# Patient Record
Sex: MALE | Race: WHITE | NOT HISPANIC OR LATINO | Employment: FULL TIME | URBAN - METROPOLITAN AREA
[De-identification: names, ages, dates, MRNs, and addresses within clinical notes are randomized per-mention and may not be internally consistent; named-entity substitution may affect disease eponyms.]

---

## 2023-07-10 LAB
BASOPHILS (10*3/UL) IN BLOOD BY AUTOMATED COUNT: 0.06 X10E9/L (ref 0–0.1)
BASOPHILS/100 LEUKOCYTES IN BLOOD BY AUTOMATED COUNT: 1.3 % (ref 0–2)
EOSINOPHILS (10*3/UL) IN BLOOD BY AUTOMATED COUNT: 0.11 X10E9/L (ref 0–0.7)
EOSINOPHILS/100 LEUKOCYTES IN BLOOD BY AUTOMATED COUNT: 2.4 % (ref 0–6)
ERYTHROCYTE DISTRIBUTION WIDTH (RATIO) BY AUTOMATED COUNT: 12.8 % (ref 11.5–14.5)
ERYTHROCYTE MEAN CORPUSCULAR HEMOGLOBIN CONCENTRATION (G/DL) BY AUTOMATED: 33.7 G/DL (ref 32–36)
ERYTHROCYTE MEAN CORPUSCULAR VOLUME (FL) BY AUTOMATED COUNT: 94 FL (ref 80–100)
ERYTHROCYTES (10*6/UL) IN BLOOD BY AUTOMATED COUNT: 4.68 X10E12/L (ref 4.5–5.9)
HEMATOCRIT (%) IN BLOOD BY AUTOMATED COUNT: 43.9 % (ref 41–52)
HEMOGLOBIN (G/DL) IN BLOOD: 14.8 G/DL (ref 13.5–17.5)
IGA (MG/DL) IN SER/PLAS: 48 MG/DL (ref 70–400)
IGG (MG/DL) IN SER/PLAS: 640 MG/DL (ref 700–1600)
IGM (MG/DL) IN SER/PLAS: 52 MG/DL (ref 40–230)
IMMATURE GRANULOCYTES/100 LEUKOCYTES IN BLOOD BY AUTOMATED COUNT: 0 % (ref 0–0.9)
LEUKOCYTES (10*3/UL) IN BLOOD BY AUTOMATED COUNT: 4.6 X10E9/L (ref 4.4–11.3)
LYMPHOCYTES (10*3/UL) IN BLOOD BY AUTOMATED COUNT: 1.4 X10E9/L (ref 1.2–4.8)
LYMPHOCYTES/100 LEUKOCYTES IN BLOOD BY AUTOMATED COUNT: 30.7 % (ref 13–44)
MONOCYTES (10*3/UL) IN BLOOD BY AUTOMATED COUNT: 0.5 X10E9/L (ref 0.1–1)
MONOCYTES/100 LEUKOCYTES IN BLOOD BY AUTOMATED COUNT: 11 % (ref 2–10)
NEUTROPHILS (10*3/UL) IN BLOOD BY AUTOMATED COUNT: 2.49 X10E9/L (ref 1.2–7.7)
NEUTROPHILS/100 LEUKOCYTES IN BLOOD BY AUTOMATED COUNT: 54.6 % (ref 40–80)
PLATELETS (10*3/UL) IN BLOOD AUTOMATED COUNT: 221 X10E9/L (ref 150–450)

## 2023-07-12 LAB
CD19%: ABNORMAL % (ref 6–19)
CD45%: 100 %
FMETH: ABNORMAL
FSIT1: ABNORMAL
MARKER INTERPRETATION: ABNORMAL
PV191: NORMAL

## 2023-10-24 ENCOUNTER — TELEPHONE (OUTPATIENT)
Dept: NEUROLOGY | Facility: CLINIC | Age: 58
End: 2023-10-24
Payer: COMMERCIAL

## 2023-10-24 DIAGNOSIS — G35 MULTIPLE SCLEROSIS (MULTI): Primary | ICD-10-CM

## 2023-10-24 NOTE — TELEPHONE ENCOUNTER
Marshall Padilla called. He would like to request Gabapentin 300mg 1 BID #180 with refills sent to [facility] .

## 2023-10-25 PROBLEM — G35 MULTIPLE SCLEROSIS (MULTI): Status: ACTIVE | Noted: 2023-10-25

## 2023-10-25 RX ORDER — GABAPENTIN 300 MG/1
1 CAPSULE ORAL 2 TIMES DAILY
COMMUNITY
Start: 2019-12-03 | End: 2023-10-25 | Stop reason: SDUPTHER

## 2023-10-25 RX ORDER — GABAPENTIN 300 MG/1
300 CAPSULE ORAL 2 TIMES DAILY
Qty: 180 CAPSULE | Refills: 1 | Status: SHIPPED | OUTPATIENT
Start: 2023-10-25 | End: 2024-04-18 | Stop reason: SDUPTHER

## 2023-10-25 NOTE — TELEPHONE ENCOUNTER
I personally reviewed OARRS and saw no reason not to refill my patients Gabapentin for his MS nerve pain. OARRS scanned into patient's chart and script sent via EPIC.  I have considered the risks of abuse, dependence, addiction, and diversion.

## 2023-11-28 ENCOUNTER — EVALUATION (OUTPATIENT)
Dept: PHYSICAL THERAPY | Facility: CLINIC | Age: 58
End: 2023-11-28
Payer: COMMERCIAL

## 2023-11-28 DIAGNOSIS — M54.2 NECK PAIN: ICD-10-CM

## 2023-11-28 DIAGNOSIS — R29.898 DECREASED ROM OF NECK: ICD-10-CM

## 2023-11-28 DIAGNOSIS — R26.9 ABNORMALITY OF GAIT: Primary | ICD-10-CM

## 2023-11-28 DIAGNOSIS — R26.89 BALANCE PROBLEM: ICD-10-CM

## 2023-11-28 PROCEDURE — 97164 PT RE-EVAL EST PLAN CARE: CPT | Mod: GP

## 2023-11-28 NOTE — PROGRESS NOTES
Physical Therapy Re-Evaluation:  Neuro    Patient Name:  Marshall Padilla   MRN:  [id number]   Date of Evaluation:  11/28/23   Time Calculation  Start Time: 1635  Stop Time: 1737  Time Calculation (min): 62 min  Visit Number:  13  Insurance Information:  20vs/yr    1. Abnormality of gait  Follow Up In Physical Therapy      2. Balance problem  Follow Up In Physical Therapy      3. Decreased ROM of neck  Follow Up In Physical Therapy      4. Neck pain  Follow Up In Physical Therapy          Assessment: Marshall Padilla is a 57 year old male referred to outpatient PT for neck, R upper extremity, R lower extremity, and gait dysfunction due to process within the patient that is being investigated and currently thought to be MS vs. myelomalacia.  It is apparent that since this therapist last saw patient that his R UE has worsened in strength, sensation and overall appearance.  Additionally, gait mechanics and upright mobility seem to have worsened.  Increased time was spent discussing the snowball nature of loss of function and the resulting less participation in activities that results in further loss of function in hopes of encouraging some more forced use of the R UE and LE (as tolerable).  This therapist will continue to work with and follow patient as he continues on with the rest of his care at [facility].  Based on patient's examination, concurrent co-morbidities, and presentation, patient's level of complexity is High.  As a result, recommending continued PT services to facilitate improvement in [facility].    Problems include:  Activity limitations, Aerobic capacity / endurance, Balance, Coordination, Decreased functional level, Decreased knowledge of HEP, Fall risk, Gait / locomotion, Motor function / tone , Pain, Participation restrictions, Posture, ROM, Sensory, Strength, and Transfers    Goals:  By Discharge patient will demo:    Improvement in gait mechanics without accessory / compensatory swing strategies for advancement of R  "LE.    Reciprocal stair navigation without vaulting of L LE    No falls during POC.    Return to regular aerobic exercise of minimum 3x per week for 20 minutes.    Other goals to be updated throughout POC.    Potential to achieve rehab goals is fair.    Plan:  1-2 times every week for a total of 10 visits.  Re-assessment after that time.    Activities that may be performed include but are not limited to:  balance, gait, transfers, modalities (as appropriate), e-stim (as appropriate), canalith repositioning maneuvers, therapeutic exercise, therapeutic activities.    Marshall Padilla in agreement with and understanding of all discussed this date.    ----------------------------------  ----------------------------------    Subjective:    HPI:  Marshall Padilla is a 57 year old male, known to this therapist, returning to outpatient PT neck, R upper extremity, R lower extremity, and gait dysfunction due to process within the patient that is being investigated and currently thought to be MS vs. myelomalacia.  Since was last here, called to get in to see Dr. Willis but he left.  Went to look into [facility] and establish care there, of which he was able to get in \"pretty quickly.\"  Saw 5 different doctors and R hand is all clenched and like a claw now.  Baclofen is fully increased.  Has had a lot of tests done in which \"nobody could find anything.\"  Thinks that could be myelomalacia with spinal cord.  Tomorrow is starting 3 days of steroids.  Whole R arm has become \"non-usable\" in which is having \"weird sensations\" in biceps and triceps.  Been doing some OT with [facility].  Was told that they do believe that it is a diagnosis of MS.  No falls but feels like is stumbling more and feels like is catching R foot more and is \"hard to use.\"  Notices at times when drinks water that starts to cough, but this doesn't happen very often.  Tries to keep up with some exercise.  Hasn't been doing the bike at all because can't keep neck up.  Tries to do calf " "raises, step ups with R LE, been trying to do leg curls and extensions at home.    Upcoming appointments:  12/4 NP at [place] -- MS  12/8 with Dr. Leal -- neuromuscular    Patient Goal:  \"Want to see what can improve again\"     Precautions: low fall risk    Pain:  R arm varying from 2/10 to 6-7/10 based on activity and particpiation    ----------------------------------  ----------------------------------    OBJECTIVE    Observation: Observed muscle wasting of R UE and LE as compared to L.  Relative trunk lateral shift noted in sitting and standing.    ROM:  c-spine ROM limited d/t previous fusion in all directions.  L UE and LE WNL all directions at all joints.  R UE AROM effortful in all directions at all joints, most significantly impaired with overhead mobility and distal mobility at wrist and fingers.    Strength:     UE Strength Screening:   RUE LUE   Sh Flex 3-/5 5/5   Sh Abd 3-/5 5/5   Elbow Flex 3+/5 5/5   Elbow Ext 3-/5 5/5   Wrist Ext 3-/5 5/5   Wrist Flex 2+/5 5/5   Gross  2+/5 5/5     LE Strength Screening:   RLE LLE   Hip Flex 3-/5 5/5   Hip ABD 4/5 5/5   Hip ADD 4/5 5/5   Knee Ext 4+/5 5/5   Knee Flex 3+/5 5/5   Ankle DF 2/5 5/5   Ankle PF 3+/5 5/5      Coordination:   Grossly impaired with R UE and R LE as compared to L as noted throughout functional movements and mobility    Sensation:  WNL on L UE and LE.  R UE more impaired over C6, C7, C8 dermatomes.  R LE no gross abnormalities noted in sensation    Functional Mobility Assessment:  - Gait:  increased movement laterally at hips, limited foot clearance of R LE with swing resulting in increased vaulting of L LE and accessory hip movements, limited heel strike R LE  - Transfers:  no gross abnormalities noted  - Other:  Stairs:  Use of L UE for steadying, decreased control of placement of R LE with descent, at times R toe catching on step with ascent, vaulting with L " LE    ----------------------------------  ----------------------------------

## 2023-11-29 DIAGNOSIS — Z79.899 DRUG-INDUCED IMMUNODEFICIENCY (MULTI): ICD-10-CM

## 2023-11-29 DIAGNOSIS — G35 MULTIPLE SCLEROSIS (MULTI): Primary | ICD-10-CM

## 2023-11-29 DIAGNOSIS — D84.821 DRUG-INDUCED IMMUNODEFICIENCY (MULTI): ICD-10-CM

## 2023-11-29 RX ORDER — DIPHENHYDRAMINE HYDROCHLORIDE 50 MG/ML
50 INJECTION INTRAMUSCULAR; INTRAVENOUS ONCE
Status: CANCELLED | OUTPATIENT
Start: 2024-01-06

## 2023-11-29 RX ORDER — FAMOTIDINE 10 MG/ML
20 INJECTION INTRAVENOUS ONCE AS NEEDED
Status: CANCELLED | OUTPATIENT
Start: 2024-01-06

## 2023-11-29 RX ORDER — EPINEPHRINE 0.3 MG/.3ML
0.3 INJECTION SUBCUTANEOUS EVERY 5 MIN PRN
Status: CANCELLED | OUTPATIENT
Start: 2024-01-06

## 2023-11-29 RX ORDER — DIPHENHYDRAMINE HYDROCHLORIDE 50 MG/ML
50 INJECTION INTRAMUSCULAR; INTRAVENOUS AS NEEDED
Status: CANCELLED | OUTPATIENT
Start: 2024-01-06

## 2023-11-29 RX ORDER — ACETAMINOPHEN 325 MG/1
650 TABLET ORAL ONCE
Status: CANCELLED | OUTPATIENT
Start: 2024-01-06

## 2023-11-29 RX ORDER — ALBUTEROL SULFATE 0.83 MG/ML
3 SOLUTION RESPIRATORY (INHALATION) AS NEEDED
Status: CANCELLED | OUTPATIENT
Start: 2024-01-06

## 2023-12-06 ENCOUNTER — APPOINTMENT (OUTPATIENT)
Dept: PHYSICAL THERAPY | Facility: CLINIC | Age: 58
End: 2023-12-06
Payer: COMMERCIAL

## 2023-12-07 ENCOUNTER — TREATMENT (OUTPATIENT)
Dept: PHYSICAL THERAPY | Facility: CLINIC | Age: 58
End: 2023-12-07
Payer: COMMERCIAL

## 2023-12-07 DIAGNOSIS — M54.2 NECK PAIN: ICD-10-CM

## 2023-12-07 DIAGNOSIS — R29.898 DECREASED ROM OF NECK: ICD-10-CM

## 2023-12-07 DIAGNOSIS — R26.89 BALANCE PROBLEM: ICD-10-CM

## 2023-12-07 DIAGNOSIS — R26.9 ABNORMALITY OF GAIT: Primary | ICD-10-CM

## 2023-12-07 PROCEDURE — 97112 NEUROMUSCULAR REEDUCATION: CPT | Mod: GP

## 2023-12-07 NOTE — PROGRESS NOTES
"Physical Therapy Treatment      Patient Name: Marshall Padilla  MRN: [id number]  Today's Date: 12/7/2023  Visit #: 14  Insurance: 20vs/yr  Time Calculation  Start Time: 1148  Stop Time: 1231  Time Calculation (min): 43 min    1. Abnormality of gait        2. Balance problem        3. Decreased ROM of neck        4. Neck pain            ASSESSMENT:  At rest, patient showing significantly more R DF strength than L.  Focus of session further working on NMR of R ankle as a whole.  Demos significant preference for Wbing and use of L LE.    GOALS:  Improvement in gait mechanics without accessory / compensatory swing strategies for advancement of R LE.     Reciprocal stair navigation without vaulting of L LE     No falls during POC.     Return to regular aerobic exercise of minimum 3x per week for 20 minutes.     Other goals to be updated throughout POC.    PLAN:  Continue with NMR of R LE and R UE    SUBJECTIVE:  Had OT this morning.  Doing well.  Has noticed a difference since the steroid infusion, but not as big as would hope in difference.  Saw MS doctor on Monday and all of them are \"fully on board\" with MS diagnosis.  Started on a new muscle relaxer at night for the past 3 days.  Been using dowel regularly and feels that it has been \"fantastic.\"  Been using curl bar.    OBJECTIVE:    TREATMENT:  Neuromuscular Re-education (83367): 43 minutes    (Prior to activities, performing seated DF, showing full ROM and maintaining with strength of 4/5)    R Heel Taps with DF NMES (300usec, 52mAcc, 5 on/off, 40Hz) -- 2 sets x 12-15 -- focusing on keeping R LE on dorsiflexion.    Blue Dynadisc Standing Balance -- 2 sets to fatigue doing lateral shifts toward R; 2 sets to fatigue of standing balance maintaining neutral / centered position    ^^ performing all of the above to challenge dynamic balance and encourage NMR of R ankle  "

## 2023-12-12 ENCOUNTER — TREATMENT (OUTPATIENT)
Dept: PHYSICAL THERAPY | Facility: CLINIC | Age: 58
End: 2023-12-12
Payer: COMMERCIAL

## 2023-12-12 DIAGNOSIS — M54.2 NECK PAIN: ICD-10-CM

## 2023-12-12 DIAGNOSIS — R26.9 ABNORMALITY OF GAIT: Primary | ICD-10-CM

## 2023-12-12 DIAGNOSIS — R29.898 DECREASED ROM OF NECK: ICD-10-CM

## 2023-12-12 DIAGNOSIS — R26.89 BALANCE PROBLEM: ICD-10-CM

## 2023-12-12 PROCEDURE — 97112 NEUROMUSCULAR REEDUCATION: CPT | Mod: GP

## 2023-12-12 NOTE — PROGRESS NOTES
Physical Therapy Treatment      Patient Name: Marshall Padilla  MRN: [id number]  Today's Date: 12/12/2023  Visit #: 15  Insurance: 20vs/yr  Time Calculation  Start Time: 0930  Stop Time: 1014  Time Calculation (min): 44 min    1. Abnormality of gait        2. Balance problem        3. Decreased ROM of neck        4. Neck pain              ASSESSMENT:  At rest, patient continues to show significantly more R DF. Focus of session on hip flexion, DF and knee flexion of R LE this date.  Will continue to focus on this going forward.    GOALS:  Improvement in gait mechanics without accessory / compensatory swing strategies for advancement of R LE.     Reciprocal stair navigation without vaulting of L LE     No falls during POC.     Return to regular aerobic exercise of minimum 3x per week for 20 minutes.     Other goals to be updated throughout POC.    PLAN:  Continue with NMR of R LE and R UE    SUBJECTIVE:  Friday met with Neuromuscular and was told that will see again in 5 months.  Questioning why had good relief for 8 months and then got worse.  Steroid didn't help with arm but did significantly with R LE.  Saw physicians from neurosurgery and [facility].  Everything in neck looks good.  Looking to modify gabapentin.  Talking about epidural in C5-C6.  Thinking related to damage on spinal cord.  Met with PM&R about Botox and not a candidate for it.  Tried using recumbent bike which seemed to help.    OBJECTIVE:    TREATMENT:  Neuromuscular Re-education (34450): 44 minutes    (Prior to activities, performing seated DF, showing full ROM and maintaining with strength of 4/5 still at this time.  Stair navigation showing less vaulting and challenge with hip flexion.)    R DF with Hip Flexion (Yellow Band) -- 3 sets x 10 -- L UE support as needed for steadying    R HS Curls -- Cybex, 20lbs, 3 sets x 10 -- performing to encourage NMR of R Hamstring    Dynadisc Balance (Blue) -- Lunge Toward R 2 sets x 10; Static Standing 2 sets x  60 seconds    ^^ performing all of the above to challenge dynamic balance and encourage NMR of R ankle

## 2023-12-14 ENCOUNTER — APPOINTMENT (OUTPATIENT)
Dept: PHYSICAL THERAPY | Facility: CLINIC | Age: 58
End: 2023-12-14
Payer: COMMERCIAL

## 2023-12-19 ENCOUNTER — APPOINTMENT (OUTPATIENT)
Dept: PHYSICAL THERAPY | Facility: CLINIC | Age: 58
End: 2023-12-19
Payer: COMMERCIAL

## 2023-12-21 ENCOUNTER — TREATMENT (OUTPATIENT)
Dept: PHYSICAL THERAPY | Facility: CLINIC | Age: 58
End: 2023-12-21
Payer: COMMERCIAL

## 2023-12-21 DIAGNOSIS — R26.89 BALANCE PROBLEM: ICD-10-CM

## 2023-12-21 DIAGNOSIS — M54.2 NECK PAIN: ICD-10-CM

## 2023-12-21 DIAGNOSIS — R29.898 DECREASED ROM OF NECK: ICD-10-CM

## 2023-12-21 DIAGNOSIS — R26.9 ABNORMALITY OF GAIT: Primary | ICD-10-CM

## 2023-12-21 PROCEDURE — 97112 NEUROMUSCULAR REEDUCATION: CPT | Mod: GP

## 2023-12-21 NOTE — PROGRESS NOTES
Physical Therapy Treatment      Patient Name: Marshall Padilla  MRN: [id number]  Today's Date: 12/21/2023  Visit #: 16  Insurance: 20vs/yr  Time Calculation  Start Time: 1147  Stop Time: 1230  Time Calculation (min): 43 min    1. Abnormality of gait        2. Balance problem        3. Decreased ROM of neck        4. Neck pain                ASSESSMENT:  At rest, patient continues to show significantly more R DF. Focus of session on hip flexion, ankle eversion and knee flexion of R LE this date.  Will continue to focus on this going forward.    GOALS:  Improvement in gait mechanics without accessory / compensatory swing strategies for advancement of R LE.     Reciprocal stair navigation without vaulting of L LE     No falls during POC.     Return to regular aerobic exercise of minimum 3x per week for 20 minutes.     Other goals to be updated throughout POC.    PLAN:  Evaluate again in the new year with new referral.    SUBJECTIVE:  R LE has been feeling good.   DF of that ankle is still strong.  Sees Dr. Patel on 1/18.    OBJECTIVE:    TREATMENT:  Neuromuscular Re-education (51047): 43 minutes    (Prior to activities, performing seated DF, showing full ROM and maintaining with strength of 4/5 still at this time.  Stair navigation showing less vaulting and challenge with hip flexion.)    R Heel Taps with Ankle Everter NMES (300usec, 44mAcc, 5 on/off, 40Hz) -- 3 sets x 12-15 -- focusing on keeping R LE on dorsiflexion.     R Ankle Isometric Hold with NMES on Ankle Everters (300usec, 44mAcc, 5 on/off, 40Hz) -- 2 sets x 5 with 5 second holds -- standing on edge of step, performing bilaterally    R HS Curls -- Cybex, 20lbs, x 10; 25lbs, 3 sets x 10 -- performing to encourage NMR of R Hamstring     ^^ performing all of the above to challenge dynamic balance and encourage NMR of R ankle

## 2023-12-26 ENCOUNTER — APPOINTMENT (OUTPATIENT)
Dept: NEUROLOGY | Facility: HOSPITAL | Age: 58
End: 2023-12-26

## 2024-01-07 DIAGNOSIS — R25.2 CRAMP AND SPASM: ICD-10-CM

## 2024-01-08 RX ORDER — BACLOFEN 20 MG/1
40 TABLET ORAL 2 TIMES DAILY
Qty: 360 TABLET | Refills: 5 | Status: SHIPPED | OUTPATIENT
Start: 2024-01-08

## 2024-01-12 ENCOUNTER — INFUSION (OUTPATIENT)
Dept: INFUSION THERAPY | Facility: CLINIC | Age: 59
End: 2024-01-12
Payer: COMMERCIAL

## 2024-01-12 VITALS
WEIGHT: 217.04 LBS | HEART RATE: 66 BPM | OXYGEN SATURATION: 99 % | DIASTOLIC BLOOD PRESSURE: 58 MMHG | BODY MASS INDEX: 27.13 KG/M2 | RESPIRATION RATE: 16 BRPM | TEMPERATURE: 98 F | SYSTOLIC BLOOD PRESSURE: 130 MMHG

## 2024-01-12 DIAGNOSIS — G35 MULTIPLE SCLEROSIS (MULTI): ICD-10-CM

## 2024-01-12 DIAGNOSIS — Z79.899 DRUG-INDUCED IMMUNODEFICIENCY (MULTI): ICD-10-CM

## 2024-01-12 DIAGNOSIS — D84.821 DRUG-INDUCED IMMUNODEFICIENCY (MULTI): ICD-10-CM

## 2024-01-12 LAB
BASOPHILS # BLD AUTO: 0.03 X10*3/UL (ref 0–0.1)
BASOPHILS # BLD AUTO: 0.03 X10*3/UL (ref 0–0.1)
BASOPHILS NFR BLD AUTO: 0.9 %
BASOPHILS NFR BLD AUTO: 0.9 %
EOSINOPHIL # BLD AUTO: 0.05 X10*3/UL (ref 0–0.7)
EOSINOPHIL # BLD AUTO: 0.06 X10*3/UL (ref 0–0.7)
EOSINOPHIL NFR BLD AUTO: 1.5 %
EOSINOPHIL NFR BLD AUTO: 1.8 %
ERYTHROCYTE [DISTWIDTH] IN BLOOD BY AUTOMATED COUNT: 12.5 % (ref 11.5–14.5)
ERYTHROCYTE [DISTWIDTH] IN BLOOD BY AUTOMATED COUNT: 12.5 % (ref 11.5–14.5)
HCT VFR BLD AUTO: 40.8 % (ref 41–52)
HCT VFR BLD AUTO: 41.5 % (ref 41–52)
HGB BLD-MCNC: 14 G/DL (ref 13.5–17.5)
HGB BLD-MCNC: 14.2 G/DL (ref 13.5–17.5)
IGA SERPL-MCNC: 44 MG/DL (ref 70–400)
IGG SERPL-MCNC: 513 MG/DL (ref 700–1600)
IGM SERPL-MCNC: 43 MG/DL (ref 40–230)
IMM GRANULOCYTES # BLD AUTO: 0.01 X10*3/UL (ref 0–0.7)
IMM GRANULOCYTES # BLD AUTO: 0.01 X10*3/UL (ref 0–0.7)
IMM GRANULOCYTES NFR BLD AUTO: 0.3 % (ref 0–0.9)
IMM GRANULOCYTES NFR BLD AUTO: 0.3 % (ref 0–0.9)
LYMPHOCYTES # BLD AUTO: 1.49 X10*3/UL (ref 1.2–4.8)
LYMPHOCYTES # BLD AUTO: 1.51 X10*3/UL (ref 1.2–4.8)
LYMPHOCYTES NFR BLD AUTO: 44.3 %
LYMPHOCYTES NFR BLD AUTO: 44.8 %
MCH RBC QN AUTO: 31.2 PG (ref 26–34)
MCH RBC QN AUTO: 31.3 PG (ref 26–34)
MCHC RBC AUTO-ENTMCNC: 34.2 G/DL (ref 32–36)
MCHC RBC AUTO-ENTMCNC: 34.3 G/DL (ref 32–36)
MCV RBC AUTO: 91 FL (ref 80–100)
MCV RBC AUTO: 91 FL (ref 80–100)
MONOCYTES # BLD AUTO: 0.49 X10*3/UL (ref 0.1–1)
MONOCYTES # BLD AUTO: 0.57 X10*3/UL (ref 0.1–1)
MONOCYTES NFR BLD AUTO: 14.5 %
MONOCYTES NFR BLD AUTO: 17 %
NEUTROPHILS # BLD AUTO: 1.21 X10*3/UL (ref 1.2–7.7)
NEUTROPHILS # BLD AUTO: 1.27 X10*3/UL (ref 1.2–7.7)
NEUTROPHILS NFR BLD AUTO: 36 %
NEUTROPHILS NFR BLD AUTO: 37.7 %
NRBC BLD-RTO: 0 /100 WBCS (ref 0–0)
NRBC BLD-RTO: 0 /100 WBCS (ref 0–0)
PLATELET # BLD AUTO: 213 X10*3/UL (ref 150–450)
PLATELET # BLD AUTO: 214 X10*3/UL (ref 150–450)
RBC # BLD AUTO: 4.47 X10*6/UL (ref 4.5–5.9)
RBC # BLD AUTO: 4.55 X10*6/UL (ref 4.5–5.9)
WBC # BLD AUTO: 3.4 X10*3/UL (ref 4.4–11.3)
WBC # BLD AUTO: 3.4 X10*3/UL (ref 4.4–11.3)

## 2024-01-12 PROCEDURE — 96375 TX/PRO/DX INJ NEW DRUG ADDON: CPT | Performed by: NURSE PRACTITIONER

## 2024-01-12 PROCEDURE — 85025 COMPLETE CBC W/AUTO DIFF WBC: CPT

## 2024-01-12 PROCEDURE — 82784 ASSAY IGA/IGD/IGG/IGM EACH: CPT

## 2024-01-12 PROCEDURE — 96415 CHEMO IV INFUSION ADDL HR: CPT | Performed by: NURSE PRACTITIONER

## 2024-01-12 PROCEDURE — 96413 CHEMO IV INFUSION 1 HR: CPT | Performed by: NURSE PRACTITIONER

## 2024-01-12 RX ORDER — ACETAMINOPHEN 325 MG/1
650 TABLET ORAL ONCE
Status: COMPLETED | OUTPATIENT
Start: 2024-01-12 | End: 2024-01-12

## 2024-01-12 RX ORDER — EPINEPHRINE 0.3 MG/.3ML
0.3 INJECTION SUBCUTANEOUS EVERY 5 MIN PRN
OUTPATIENT
Start: 2024-01-12

## 2024-01-12 RX ORDER — ACETAMINOPHEN 325 MG/1
650 TABLET ORAL ONCE
Status: CANCELLED | OUTPATIENT
Start: 2024-01-12

## 2024-01-12 RX ORDER — ALBUTEROL SULFATE 0.83 MG/ML
3 SOLUTION RESPIRATORY (INHALATION) AS NEEDED
OUTPATIENT
Start: 2024-01-12

## 2024-01-12 RX ORDER — DIPHENHYDRAMINE HYDROCHLORIDE 50 MG/ML
50 INJECTION INTRAMUSCULAR; INTRAVENOUS AS NEEDED
OUTPATIENT
Start: 2024-01-12

## 2024-01-12 RX ORDER — DIPHENHYDRAMINE HYDROCHLORIDE 50 MG/ML
50 INJECTION INTRAMUSCULAR; INTRAVENOUS ONCE
Status: CANCELLED | OUTPATIENT
Start: 2024-01-12

## 2024-01-12 RX ORDER — FAMOTIDINE 10 MG/ML
20 INJECTION INTRAVENOUS ONCE AS NEEDED
OUTPATIENT
Start: 2024-01-12

## 2024-01-12 RX ORDER — DIPHENHYDRAMINE HYDROCHLORIDE 50 MG/ML
50 INJECTION INTRAMUSCULAR; INTRAVENOUS ONCE
Status: COMPLETED | OUTPATIENT
Start: 2024-01-12 | End: 2024-01-12

## 2024-01-12 RX ADMIN — DIPHENHYDRAMINE HYDROCHLORIDE 50 MG: 50 INJECTION INTRAMUSCULAR; INTRAVENOUS at 10:15

## 2024-01-12 RX ADMIN — ACETAMINOPHEN 650 MG: 325 TABLET ORAL at 10:15

## 2024-01-12 ASSESSMENT — ENCOUNTER SYMPTOMS
EXTREMITY WEAKNESS: 1
RESPIRATORY NEGATIVE: 1
NECK PAIN: 1
NECK STIFFNESS: 1
NUMBNESS: 1
CONSTITUTIONAL NEGATIVE: 1
CARDIOVASCULAR NEGATIVE: 1
ARTHRALGIAS: 1
CONSTIPATION: 1

## 2024-01-12 ASSESSMENT — PAIN SCALES - GENERAL: PAINLEVEL: 3

## 2024-01-12 NOTE — PROGRESS NOTES
[facility]   Infusion Clinic Note   Date: 2024   Name: Marshall Padilla  : 1965   MRN: [id number]         Reason for Visit: OP Infusion (PATIENT HERE FOR OCREVUS 600 MG INFUSION)      Accompanied by:Self   Visit Type:: Infusion   Diagnosis: Multiple sclerosis (CMS/HCC)    Drug-induced immunodeficiency (CMS/HCC)    Allergies:   Allergies as of 2024    (No Known Allergies)      Current [facility] has a current medication list which includes the following prescription(s): baclofen, gabapentin, and ocrelizumab, and the following Facility-Administered Medications: ocrelizumab (Ocrevus) 600 mg in sodium chloride 0.9% 500 mL IV.        Vitals:  Vitals:    24 0955   BP: (!) 155/93   Pulse: 88   Resp: 18   Temp: 36.7 °C (98 °F)   SpO2: 99%   Weight: 98.5 kg (217 lb 0.7 oz)      Infusion Pre-procedure Checklist   Allergies reviewed: yes   Medications reviewed: yes   Contraindications to treatment:No   Previous reaction to current treatment:No   Current Health Issues: None   Pain: 3 [3]' Shoulder [29] (right)   Is the pain different from normal: No   Is the pain tolerable: YES   Is your Doctor aware: YES   Contraindications based on patient history: No   Provider notified: Not applicable   Labs: Pending  Labs reviewed   Fall Risk Screening: Thayer Fall Risk  History of Falling, Immediate or Within 3 Months: No  Secondary Diagnosis: Yes  Ambulatory Aid: Walks without aid/bedrest/nurse assist  Intravenous Therapy/Heparin Lock: No  Gait/Transferring: Normal/bedrest/immobile  Mental Status: Oriented to own ability  Thayer Fall Risk Score: 15    Review of Systems   Constitutional: Negative.    Respiratory: Negative.     Cardiovascular: Negative.    Gastrointestinal:  Positive for constipation.   Musculoskeletal:  Positive for arthralgias, neck pain and neck stiffness.   Neurological:  Positive for extremity weakness (RIGHT ARM AND LEG) and numbness (RIGHT ARM AND LEG).      Negative for  "complaint: [] all other systems have been reviewed and are negative for complaint   Infusion Readiness:   Assessment Concerns Related to Infusion: No  Provider notified: n/a  Assess patient for the concerns below. Document provider notification as appropriate:  - Does not meet criteria to treat N/A  - Has an active or recent infection with/without current antibiotic use No  - Has recent/planned dental work No  - Has recent/planned surgeries No  - Has recently received or plans to receive vaccinations No  - Has treatment related toxicities No  - Is pregnant (unless noted otherwise) N/A    Initiated By: Patience Summers RN   Time: 10:41 AM     We administered acetaminophen, methylPREDNISolone sod succinate, diphenhydrAMINE, and ocrelizumab (Ocrevus) 600 mg in sodium chloride 0.9% 500 mL IV.       (Unless otherwise specified on patient specific therapy plan):     TREATMENT CONDITIONS:  Unless otherwise specified on patient specific therapy plan HOLD and notify provider prior to proceeding with today's infusion if patient with:  o Positive Hepatitis B Surface Ag or panel  o Positive T-Spot    No results found for: \"TBSIN\", \"TBGRES\", \"QFG\", \"TSPOTR\"   No results found for: \"HAGCN\", \"HEPBSURABI\", \"HBSAG\", \"XHAGF\", \"HEPBSAG\", \"EXTHEPBSAG\", \"NONUHSWGH\"   No results found for: \"HEPATOT\", \"HEPAIGM\", \"HEPBCIGM\", \"HEPBCAB\", \"HBEAG\", \"HEPCAB\"   No results found for: \"NONUHFIRE\", \"NONUHSWGH\", \"NONUHFISH\", \"EXTHEPBSAG\"    Labs reviewed and patient meets treatment conditions? Yes    DRUG SPECIFIC QUESTIONS:  Any signs or symptoms of Progressive multifocal leukoencephalopathy (PML) which may include: memory loss, trouble thinking, dizziness, loss of balance, difficulty talking / walking or loss of vision? No        Note authored and patient cared for by: Patience Summers RN    Note/Encounter reviewed by: ZAHIRA Wolfe, FNP-C. This provider on site at time of patient infusion. Infusion staff to notify this provider of any questions, " concerns, abnormals or issues during infusion. No issues reported. Pt. tolerated infusion without difficulty. Pt. not independently evaluated by this provider during today's encounter.

## 2024-01-12 NOTE — PATIENT INSTRUCTIONS
Today :We administered acetaminophen, methylPREDNISolone sod succinate, diphenhydrAMINE, and ocrelizumab (Ocrevus) 600 mg in sodium chloride 0.9% 500 mL IV.     For:   1. Multiple sclerosis (CMS/HCC)    2. Drug-induced immunodeficiency (CMS/[facility])         Your next appointment is due in:  6 MONTHYS        Please read the  Medication Guide that was given to you and reviewed during todays visit.     (Tell all doctors including dentists that you are taking this medication)     Go to the emergency room or call 911 if:  -You have signs of allergic reaction:   -Rash, hives, itching.   -Swollen, blistered, peeling skin.   -Swelling of face, lips, mouth, tongue or throat.   -Tightness of chest, trouble breathing, swallowing or talking     Call your doctor:  - If IV / injection site gets red, warm, swollen, itchy or leaks fluid or pus.     (Leave dressing on your IV site for at least 2 hours and keep area clean and dry  - If you get sick or have symptoms of infection or are not feeling well for any reason.    (Wash your hands often, stay away from people who are sick)  - If you have side effects from your medication that do not go away or are bothersome.     (Refer to the teaching your nurse gave you for side effects to call your doctor about)    - Common side effects may include:  stuffy nose, headache, feeling tired, muscle aches, upset stomach  - Before receiving any vaccines     - Call the [facility] at   If:  - You get sick, are on antibiotics, have had a recent vaccine, have surgery or dental work and your doctor wants your visit rescheduled.  - You need to cancel and reschedule your visit for any reason. Call at least 2 days before your visit if you need to cancel.   - Your insurance changes before your next visit.    (We will need to get approval from your new insurance. This can take up to two weeks.)     The [facility] is opened Monday thru Friday. We are closed on weekends and  holidays.   Voice mail will take your call if the center is closed. If you leave a message please allow 24 hours for a call back during weekdays. If you leave a message on a weekend/holiday, we will call you back the next business day.

## 2024-01-18 ENCOUNTER — OFFICE VISIT (OUTPATIENT)
Dept: NEUROLOGY | Facility: CLINIC | Age: 59
End: 2024-01-18
Payer: COMMERCIAL

## 2024-01-18 VITALS
RESPIRATION RATE: 18 BRPM | SYSTOLIC BLOOD PRESSURE: 130 MMHG | HEART RATE: 75 BPM | WEIGHT: 215 LBS | DIASTOLIC BLOOD PRESSURE: 85 MMHG | BODY MASS INDEX: 26.87 KG/M2

## 2024-01-18 DIAGNOSIS — G35 MULTIPLE SCLEROSIS (MULTI): Primary | ICD-10-CM

## 2024-01-18 PROCEDURE — 1036F TOBACCO NON-USER: CPT | Performed by: PSYCHIATRY & NEUROLOGY

## 2024-01-18 PROCEDURE — 99215 OFFICE O/P EST HI 40 MIN: CPT | Performed by: PSYCHIATRY & NEUROLOGY

## 2024-01-18 RX ORDER — ACETAMINOPHEN, DEXTROMETHORPHAN HBR, DOXYLAMINE SUCCINATE, PHENYLEPHRINE HCL 650; 20; 12.5; 1 MG/30ML; MG/30ML; MG/30ML; MG/30ML
SOLUTION ORAL
COMMUNITY

## 2024-01-18 RX ORDER — ACETAMINOPHEN 325 MG/1
TABLET ORAL EVERY 6 HOURS PRN
COMMUNITY
Start: 2022-07-13

## 2024-01-18 RX ORDER — ATORVASTATIN CALCIUM 20 MG/1
1 TABLET, FILM COATED ORAL
COMMUNITY
Start: 2023-07-11

## 2024-01-18 RX ORDER — TIZANIDINE 2 MG/1
2 TABLET ORAL NIGHTLY
COMMUNITY

## 2024-01-18 RX ORDER — ALBUTEROL SULFATE 90 UG/1
AEROSOL, METERED RESPIRATORY (INHALATION)
COMMUNITY
Start: 2023-12-19

## 2024-01-18 RX ORDER — ACETAMINOPHEN 500 MG
TABLET ORAL
COMMUNITY

## 2024-01-18 ASSESSMENT — PATIENT HEALTH QUESTIONNAIRE - PHQ9
SUM OF ALL RESPONSES TO PHQ9 QUESTIONS 1 AND 2: 0
2. FEELING DOWN, DEPRESSED OR HOPELESS: NOT AT ALL
1. LITTLE INTEREST OR PLEASURE IN DOING THINGS: NOT AT ALL

## 2024-01-18 ASSESSMENT — ENCOUNTER SYMPTOMS
LOSS OF SENSATION IN FEET: 1
OCCASIONAL FEELINGS OF UNSTEADINESS: 0
DEPRESSION: 0

## 2024-01-18 ASSESSMENT — PAIN SCALES - GENERAL: PAINLEVEL: 4

## 2024-01-18 NOTE — PROGRESS NOTES
Chief Complaint  PPMS.      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 known: PPMS with activity  Date of onset: Incidentally found 6/2017, clinical onset 3/2018  Date of diagnosis: 10/19/2017 radiological diagnosis, 05/15/2018 clinical diagnosis  disease course at onset: progressive relapsing   disease course now: progressive relapsing   Current DMT: Ocrevus since June 2018  Previous DMT: none   Last MRI brain: 6/2023  Last MRI cervical: 11/2023  Last MRI thoracic: 11/2023  Last VEP: negative in 8/2017  CSF: done 9/2017 normal CSF, negative OCBs and IGG index  JCV: positive 0.6 9/2017  VZV: positive 9/2017  HEP panel: negative 9/2017  NMO: negative 9/2017     This is a 51 year old right handed White male patient who presented initially with right arm pain in the setting of significant C5-6 disc prolapse with radiculomyelopathy and an incidental finding of an unrelated demyelinating -like plaque at the CMJ. brain MRI showed only non specific deep white matter hyperintense lesions. He underwent a successful cervical decompression surgery with Dr. Willis in October 2017 and his RUE improved. LP was negative for MS markers but he started having progressive RLE weakness in March 2018. Repeat imaging showed a new enhancing lesion at T4 on the right side. A diagnosis of PPMS with activity and progression was made.      INTERVAL HISTORY:  After Dr. Willis left , he went to [facility] for evaluation of progressive RUE weakness and new dystonic flexion of the right fingers. He was seen by neurosurgery and had repeat spine MRIs in November that were stable. He hand an EMG done that showed no evidence of ulnar neuropathy or motor neuron disease. He saw Dr. Hopper at the [place] who agreed with ocrevus treatment and referred him to the spasticity clinic where he was deemed unsuitable for botox for spasticity. He was treated empirically with high dose IV  steroids followed by prednisone taper at the [facility] with subsequent improvement in foot lift and gait. He then had an URTI and had an xray that was negative for PNA but showed some abdominal hyperdensity that he is undergoing CT abdomen for soon. He is noticing a change in his trunk/abdomen shape and a leaning towards the right when walking. His last ocrevus labs from last week showed a decrease in IgG level but normal ALC.      MS symptom review:     weakness: yes RLE and RUE   sensory changes: yes presenting symptom, RUE  incoordination: no  falling: no  gait change: yes, limp  painful vision loss: no  double vision: no  vertigo: no  facial/bulbar weakness: no but had facial numbness and twitching, resolved  Lhermitte's: no  bladder/bowel dysfunction: urinary frequency      spasticity: yes, right leg now feels heavier and circumducts while walking, improved after baclofen.   tonic spasms: yes RUE isometric painful triggered by lying down at night. almost daily.   tremors: no  RLS: no  dystonia: no  other movement disorders: no     heat sensitivity: yes especially during work ()   fatigue: yes  depression: no  anxiety: yes major   cognitive changes: nothing major     DMT: Ocrevus as of 6/2018  Side effects: No     Vitamin D: 5000 units daily  symptomatic: baclofen 40 mg, helpful. Gabapentin. Amantadine is prescribed but he never took it.               Review of Systems  All systems reviewed and are negative except as mentioned in the HPI.        Active Problems  Problems    · Abnormal MRI, cervical spine (793.7) (R93.7)   · Abnormality of gait (781.2) (R26.9)   · Arm pain (729.5) (M79.603)   · right   · Balance problem (781.99) (R26.89)   · Cervical disc disease with myelopathy (722.71) (M50.00)   · Cervical myelopathy (721.1) (G95.9)   · Cervical radiculopathy (723.4) (M54.12)   · Chronic sinusitis (473.9) (J32.9)   · Decreased coordination (781.3) (R27.8)   · Decreased ROM of neck (723.8)  (R29.898)   · Decreased strength, endurance, and mobility (780.79,780.99) (R53.1,R68.89,Z74.09)   · Depression with anxiety (300.4) (F41.8)   · Dizziness (780.4) (R42)   · Encounter for fitness for duty examination (V70.5) (Z02.89)   · Grippy cold (487.1) (J11.1)   · Immunosuppression (279.9) (D84.9)   · Multiple sclerosis, primary progressive (340) (G35)   · Myelitis (323.9) (G04.91)   · Neck pain (723.1) (M54.2)   · Neck stiffness (723.5) (M43.6)   · Numbness of arm (782.0) (R20.0)   · Otitis externa (380.10) (H60.90)   · Pain of right upper extremity (729.5) (M79.601)   · Paresthesia of arm (782.0) (R20.2)   · Paresthesias/numbness (782.0) (R20.9)   · Pins and needles sensation (782.0) (R20.2)   · right arm   · Right arm weakness (729.89) (R29.898)   · Right-sided muscle weakness (728.87) (M62.81)   · S/P cervical spinal fusion (V45.4) (Z98.1)   · ACDF 2017   · Sensation problem (782.0) (R20.9)   · Shingles (053.9) (B02.9)   · Shoulder pain, bilateral (719.41) (M25.511,M25.512)   · Spasticity (781.0) (R25.2)   · Temporal headache (784.0) (R51.9)   · Vitamin D deficiency (268.9) (E55.9)     Past Medical History  Problems    · No pertinent past medical history     Surgical History  Problems    · History of Lower Back Surgery   · History of Posterior cervical vertebral fusion   · 7/11/22     Family History  Mother    · No pertinent family history  Father    · Family history of hyperlipidemia (V18.19) (Z83.438)   · Family history of hypertension (V17.49) (Z82.49)  Aunt    · Family history of multiple sclerosis (V17.2) (Z82.0)     Social History  Problems    · Born in [place]   · Completed elementary school   · Completed high school   · Education Level: Some college   · Employed   · Has been a  since March of 1989.   ·    · Never a smoker   · Number of children   · Has a 13 and 17 year old sons.   · Occasional alcohol use     Allergies  Medication    · No Known Drug Allergies   Recorded By: Bartolo  James ANDREW; 12/17/2015 9:39:57 AM        Physical Exam  GENERAL APPEARANCE: No distress, alert, interactive and cooperative.      CARDIOVASCULAR: Regular, rate and rhythm, no murmurs, normal S1 and S2. Pulses +2 and equal in all extremities. No swelling, varicosities, edema, or tenderness to palpation.      MENTAL STATE: Orientation was normal to time, place and person. Recent and remote memory was intact. Attention span and concentration were normal. Language testing was normal for comprehension, naming, repetition and expression. General fund of knowledge was intact. Able to spell WORLD forwards and backwards.     OPHTHALMOSCOPIC: nl cup:disc ratio bilaterally     CRANIAL NERVES:   CN 2 Visual fields full to confrontation.   CN 3, 4, 6 Pupils round, 4 mm in diameter, equally reactive to light. Lids symmetric; no ptosis. EOMs normal alignment, full range with normal saccades, pursuit and convergence. No nystagmus.   CN 5 Facial sensation intact bilaterally to light touch & pinprick  CN 7 Normal and symmetric facial strength. Nasolabial folds symmetric.   CN 8 Hearing intact to conversation.  CN 9 Palate elevates symmetrically.   CN 11 Normal strength of shoulder shrug.  CN 12 Tongue midline, with normal bulk and strength; no fasciculations.      MOTOR: Muscle bulk was normal and tone was normal in both upper and lower extremities. No adventitious movements.      R L  Delt 5 5  Bicep 4+ 5  Tricep 4- 5   Wrist Flex 4 5   Wrist Ext 4+ 5  ADM 4 5     Hip Flex 4+ 5  Hip Add 5 5  Leg Ext 4 5  Leg Flex 4 5  DF 4- 5  PF 4 5      REFLEXES:   RIGHT UE LEFT UE   BR:2   BR:2   Biceps:2 Biceps:2   Triceps:2 Triceps:2      RIGHT LLE LEFT LLE   Knee:3+            Knee:3+   Ankle:3 Ankle:3      Bilateral Hood with R>L. No clonus, frontal release signs or other pathologic reflexes present.      SENSORY: Sensory exam was normal. In both upper and lower extremities, sensation was intact to light touch; sharp/dull.      COORDINATION: Coordination exam was normal. In both upper extremities, finger-nose-finger was intact without dysmetria or overshoot. In both lower extremities, heel-to-shin was intact. MITCH intact bilaterally. Fine finger movement intact.     GAIT: Gait was abnormal. Station was stable with a normal base. Gait was stable with a decreased rigth arm swing and speed. No ataxia, shuffling or waddling was present. R circumduction w/ slight steppage was present. Tandem gait was intact. No Romberg sign was present.     Multiple sclerosis Functional Composite (MSFC):  25 foot walk: 4.3 s compared to 4.4 s compared to 3.6 s compared to 3.5 s compared to 3.5 s 3.9 s 4.3 s compared to 3.6 s compared to 4.6 s compared to 3.2 s in the last visit.      9-PEG-WHOLE:      Right 37.8 s  Left    18.1 s                                            Estimated EDSS: 2 up from 1 initially   Constitutional: General appearance: no acute distress   Mental status: The patient was in no distress, alert, interactive and cooperative. Affect is appropriate.   Orientation: oriented to person,~oriented to place~and~oriented to time.   Memory: recent memory intact~and~remote memory intact.   Attention: normal attention span~and~normal concentrating ability.   Language: normal comprehension~and~no difficulty naming common objects.   Fund of knowledge: Patient displays adequate knowledge of current events,~adequate fund of knowledge regarding past history~and~adequate fund of knowledge regarding vocabulary.   Cranial nerve II: Visual fields full to confrontation.~no red desaturation, no RAPD.   Cranial nerves III, IV, and VI: Pupils round, equally reactive to light; no ptosis. EOMs intact. No nystagmus.~no red desaturation.   Cranial Nerve V: Facial sensation intact bilaterally.   Cranial nerve VII: Normal and symmetric facial strength.   Cranial nerve VIII: Hearing is intact bilaterally to finger rub / whisper.   Cranial nerves IX and X: Palate  elevates symmetrically.   Cranial nerve XI: Shoulder shrug and neck rotation strength are intact.   Cranial nerve XII: Tongue midline with normal strength.   Motor: Muscle bulk was normal in both upper and lower extremities.~Abnormal.~grade 1+ spasticity in the RLE with 3 to 4 beats of unsustained knee and ankle clonus. RUE tone may be sightly increased as well.~Abnormal.~RLE 4+/5 with partial right foot drop.~no abnormal or adventitious movements were present.   Deep Tendon Reflexes: left biceps 3+,~right biceps 3+,~left triceps 2+,~right triceps 2+,~left brachioradialis 4+,~right brachioradialis 4+,~left patella 4+,~right patella 3+,~left ankle jerk 4+,~right ankle jerk 3+   Plantar Reflex: Toes downgoing to plantar stimulation on the left.~Toes upgoing to plantar stimulation on the right.   Coordination: There is no limb dystaxia and rapid alternating movements are intact.   Gait:. right circumduction gait.      Scores and Scales     Pyramidal Functions: (2) Minimal disability.   Cerebellar Functions: (0) Normal.   Brainstem Functions: (0) Normal.      Sensory Function: (0) Normal.                     Bowel and Bladder Function: (0) Normal.      Visual Function: (0) Normal.         Cerebral (or Mental) Functions: (0) Normal.   EDSS - Expanded Disability Status: 2.0 - Minimal disability in one FS    Provider Impressions  #PPMS  #BPPV     Assessment:  This is a 51 year old White right handed male with PMH of lumbar disc disease and a family hx of MS who presented initially in August 2017 with right arm pain and subjective weakness. The neurological exam was positive for generalized pathological hyperreflexia. I have personally reviewed the brain MRIs from 6/2017 which showed a few non-enhancing T2 hyperintense lesions mainly in the deep white matter without any true periventricular, juxtacortical, or infratentorial lesions. I personally reviewed his cervical MRI which showed C5-6 disc herniation with cord  compression and myelomalecia. In addition, it showed a short non-enhancing demyelinating-like lesion at the cervicomedullary junction. CSF was normal including negative OCB and IGG index. VEP was normal. MS mimics ruled out including NMO. He underwent a successful cervical decompression surgery in 10/2017 with subsequent improvement in RUE symptoms. He was initially thought to have an incidental asymptomatic small area of demyelination in the upper cervical cord without evidence of dissemination in space or time and no markers of recurrence or disease progression. He was therefore deemed appropriate for monitoring off DMT.     05/14/2018: started having progressive RLE weakness, heaviness, and partial foot drop. Exam revealed new RLE weakness and mild spasticity.   05/15/2018 I personally reviewed his new MRI and compared them to the MRIs from 2017. MRI brain did not show active or new lesions compared to 2017. MRI cervical showed improved cord compression at the site of surgery and stable upper CMJ lesion (to my eyes looks somewhat bigger). thoracic MRI showed a new enhancing lesion at T4 on the right side. This confirms PPMS with activity. Patient is indicated for steroid therapy and Ocrevus.      11/20/2018: new transient left facial numbness and twitching but MRI negative for any new or active lesions in the brain or spinal cord. Likely pseudorelapse from a previous unnoticed relapse. tolerating acrevus well. baclofen helpful.      06/12/2019: MRI stable, worsening fatigue and spasms. Will start amantadine and increase baclofen.      12/03/2019: Some worsening of pre-existing symptoms in the RUE and RLE but exam stable or even improved compared to before. cervical XRAY showed some osseous bridging at the surgical site and Dr. Willis is considering a revision. The new symptoms could be expected fluctuation of MS symptoms or recurrent cord compression at the previous surgical site.      06/09/2020: He feels that  his right leg is getting weaker but his exam and 25-f-w are actually stable to improved compared to prior exams. He works as a  and is frequently exposed to COVID-19 patients but with full PPE. His brain and cervical MRIs from May did not show any changes compared to the prior MRI from last year. Will get Ig and CD19 levels. I advised against exposure to COVID-19 patients while on ocrevus.      12/09/2020: He feels that his right leg is still getting weaker but exam is stable to improved.. he tried a bioness but felt that he's not ready for it yet and the therapists agreed as well. His labs from June showed only slight lymphopenia and minor reduction in Igg. he saw Dr. Willis who had no further recs regarding his cervical myelopathy. Ocrevus is going well. Baclofen is helpful at the higher dose.     06/03/2021: He feels that his right arm and leg are still getting weaker and number. His right arm is also painful. It is hard for him now to use the right hand for cooking, playing basketball, and even driving. Exam remaisn stable with excellent walking speed. MRI brain and cervical stable. his latest labs still showed mild reduction of IgG and ALC. He took his first COVID vaccine in December and the second in January. He had received his ocrevus dose shortly before his first vaccine dose in December. will increase baclofen and gabapentin. will check labs including spike protein.      01/17/2022: continues with RUE pain and numbness and is getting a second opinion with another spine specialist. No change in his known RLE weakness and gait fatigue. He had sever left flank pain after the latest ocrevus infusion and went to the ER. No kidney stone was found. He became constipated later after he was given oxycodone for pain, now improving. He received COVID booster in October but still tested negative for the COVID19 spike antibody. Labs from [facility] still show mild reduction in ALC and IgG.      08/22/2022:  worsening sensorimotor deficits over right C6/7 distribution prompted posterior decompression for cervical myelopathy. Post-op improvement in RUE symptoms. Recently hit head -> post-traumatic BPPV. Seen in [facility] in ED, improving recently. No new MS symptoms and walking speed stable. MRI stable without new or active lesions. labs acceptable with only slight reduction in ALC and IGG. He pushed his next ocrevus to January for deductible purposes, which is fine as he never had early B cell repletion.      06/26/2023: He did not pass the latest physical for work and had to retire. been frustrated about that. right leg is getting stiffer and right arm getting harder to use despite the transient improvement last year after cervical repeat surgery. no infections. blood work with stable mild reduction in IgG and ALC. MRI done at an OSH due to insurance issues and the CD he brought in was empty. 25-f-w is stable but gait is more spsatic. will increase baclofen to 30 mg BID.     1/18/2024: After Dr. Willis left , he went to [facility] for evaluation of progressive RUE weakness and new dystonic flexion of the right fingers. He was seen by neurosurgery and had repeat spine MRIs in November that were stable. He hand an EMG done that showed no evidence of ulnar neuropathy or motor neuron disease. He saw Dr. Hopper at the [place] who agreed with ocrevus treatment and referred him to the spasticity clinic where he was deemed unsuitable for botox for spasticity. He was treated empirically with high dose IV steroids followed by prednisone taper at the [facility] with subsequent improvement in foot lift and gait but without effect on his RUE. He then had an URTI that required antibiotic therapy. He had an xray that was negative for PNA but showed some abdominal hyperdensity that he is undergoing CT abdomen for soon. He is noticing a change in his trunk/abdomen shape and a leaning towards the right when walking. His last ocrevus labs from  last week showed a decrease in IgG level but normal ALC. On exam he does have new grade 1-2 spasticity in the right fingers with mild dynamic dystonic flexion of the fingers. I think he can be a good candidate for a low dose botox to the finger flexors (25 FDS and 25 FDP) with the caveat of potentially causing finger weakness. He wants to think about it and let me know. His RUE weakness and spasticity are clearly directly related to his double cervical pathology including demyelination and compressive myelopathy. I don't think it is necessary to look for additional causes. He has PPMS and his disease is expected to progress despite ocrevus. He may need IVIG replacement in the future if he develops serious infections. Although his favorable response to pulse steroid therapy is encouraging, we will have to be conservative with steroids given his increased risk of infection in the setting of ocrevus and iatrogenic hypogammaglobulinemia.     Plan:     - Acute relapse management: Not indicated. He had a good response to empiric steroids in 2023 so may consider this in the future if his symptoms get worse but we have to be conservative with this approach given his low IGG levels.       - Continue Ocrevus.      - Labs: will get labs before the next infusion.      - Will order MRI of the brain WWO to be done in June of 2024. will try to get access to his latest imaging at [facility].      - Symptomatic management: continue same doses of baclofen to 30 mg BID and gabapeitn to 300 mg TID. He will let me know his final decision regarding botox.       - Vitamin D: continue 5000 units daily.      - Smoking: non smoker      - PT/OT: continue     - Instructions: keep an active life style and develop exercise routine as tolerated. Recommend a balanced healthy diet. Avoid excessive amounts of salt, carbs, fat, and red meat. Increase intake of fruits, vegetables, and white meat.      - Follow up: after six months after MRI in June     - The  impression and plan were discussed with the patient and their family (if present) in detail and all questions answered. They agreed to the plan as outlined above.         I spent 40 minutes with this patient. Greater than 50% of this time was spent in counseling and/or coordination of care.     Vern Patel MD, PhD   of Neurology  [employer] [facility] [employer]  Director of Neuroimmunology and staff neurologist at the [facility]  [facility]

## 2024-01-18 NOTE — PATIENT INSTRUCTIONS
I think your finger curling is caused by early spasticity from both cord compression and MS. Please continue the same doses of baclofen and tizanidine. You may benefit from botox for your fingers. Please let me know if you want to try that.     Your blood work from January showed stable immune cell counts but your immunoglobulin G level is getting a little lower. Please let me know if you have any serious infections.      I will order MRI of the brain to be done in November of 2024.     I will order PT/OT for you     please continue the same dose of vitamin D.      Continue using the [employer] bruce as much as you can.      Follow up after six months.      Thank you very much for seeing me today.     Vern Patel MD, PhD   of Neurology  [facility] [employer]  Director of Neuroimmunology and staff neurologist at the [facility]  [facility]

## 2024-02-08 ENCOUNTER — EVALUATION (OUTPATIENT)
Dept: PHYSICAL THERAPY | Facility: CLINIC | Age: 59
End: 2024-02-08
Payer: COMMERCIAL

## 2024-02-08 DIAGNOSIS — G35 MULTIPLE SCLEROSIS (MULTI): ICD-10-CM

## 2024-02-08 DIAGNOSIS — R29.898 DECREASED ROM OF NECK: ICD-10-CM

## 2024-02-08 DIAGNOSIS — R26.9 ABNORMALITY OF GAIT: ICD-10-CM

## 2024-02-08 DIAGNOSIS — R26.89 BALANCE PROBLEM: Primary | ICD-10-CM

## 2024-02-08 DIAGNOSIS — M54.2 NECK PAIN: ICD-10-CM

## 2024-02-08 PROCEDURE — 97112 NEUROMUSCULAR REEDUCATION: CPT | Mod: GP

## 2024-02-08 PROCEDURE — 97164 PT RE-EVAL EST PLAN CARE: CPT | Mod: GP

## 2024-02-08 NOTE — PROGRESS NOTES
"Physical Therapy Re-Assessment     Patient Name: Marshall Padilla  MRN: [id number]  Today's Date: 2/8/2024  Visit #: 1  Insurance: 20vs/yr  Time Calculation  Start Time: 0908  Stop Time: 1006  Time Calculation (min): 58 min    1. Balance problem        2. Multiple sclerosis (CMS/HCC)  OT eval and treat    PT eval and treat      3. Abnormality of gait        4. Decreased ROM of neck        5. Neck pain                  ASSESSMENT:  Since December, patient's R UE strength and function has significantly improved.  His R LE, however, seems to have regressed to pre-steroid levels.  Discussed at length HEP and plan going forward.  Patient in agreement.    GOALS:  Improvement in gait mechanics without accessory / compensatory swing strategies for advancement of R LE.     Reciprocal stair navigation without vaulting of L LE     No falls during POC.     Improvements in strength of R hip, knee and ankle flexion by half a grade.    Subjective reports of improvements in posture.     Other goals to be updated throughout POC.    PLAN:  Focus on R hamstrings and core    SUBJECTIVE:  Seen for the first time since 12/21/24.  Been riding the bike more and wearing R wrist brace which has been helping.  Pain in the R UE isnt as much as it sed to be.  Been increasing on strength training which he is happy with, including bench presses and lat pull downs.  Got a CT scan of chest because of being sick and was found to have a kidney stone, which could correlate to flank pain.  Will be getting this further investigated.  Saw Dr. Patel and had \"good conversation.\"  Discussed R hand and was called focal spasticity.  Looking into Botox for that.  Would like to focus on posture and L hamstring with therapy at this time.  Doesn't have R ankle strength as did last time was here.    OBJECTIVE:    TREATMENT:    Re-Evaluation (63583): 48 minutes    Posture: Demoing what appears to scoliosis throughout lumbar and t-spine.  Unsure if this is a rigid posture " or habitual / degenerative change d/t progressive muscle weakness throughout trunk.    Strength:      UE Strength Screening:    RUE LUE   Sh Flex 4/5 5/5   Sh Abd 4/5 5/5   Elbow Flex 4/5 5/5   Elbow Ext 4-/5 5/5   Wrist Ext 4+/5 5/5   Wrist Flex 3/5 5/5   Gross  90lbs 130lbs        LE Strength Screening:    RLE LLE   Hip Flex 3-/5 5/5   Hip ABD 4/5 5/5   Hip ADD 4/5 5/5   Knee Ext 4+/5 5/5   Knee Flex 3+/5 5/5   Ankle DF 2+/5 5/5   Ankle PF 3+/5 5/5      Coordination:   Grossly impaired with R UE and R LE as compared to L as noted throughout functional movements and mobility     Sensation:  WNL on L UE and LE.  R UE more impaired over C6, C7, C8 dermatomes.  R LE no gross abnormalities noted in sensation     Functional Mobility Assessment:  - Gait:  increased movement laterally at hips, limited foot clearance of R LE with swing resulting in mild vaulting of L LE and accessory hip movements, limited heel strike R LE  - Transfers:  no gross abnormalities noted  - Other:  Stairs:  Use of L UE for steadying, decreased control of placement of R LE with descent, at times R toe catching on step with ascent, vaulting with L LE    Neuromuscular Re-education (98851): 10 minutes  Discussed at length home program, added in the following    L Side Planks  Standing Lateral Hip Glides  R LE HS Curls

## 2024-02-21 ENCOUNTER — TREATMENT (OUTPATIENT)
Dept: PHYSICAL THERAPY | Facility: CLINIC | Age: 59
End: 2024-02-21
Payer: COMMERCIAL

## 2024-02-21 DIAGNOSIS — R29.898 DECREASED ROM OF NECK: ICD-10-CM

## 2024-02-21 DIAGNOSIS — M54.2 NECK PAIN: ICD-10-CM

## 2024-02-21 DIAGNOSIS — R26.9 ABNORMALITY OF GAIT: ICD-10-CM

## 2024-02-21 DIAGNOSIS — R26.89 BALANCE PROBLEM: ICD-10-CM

## 2024-02-21 PROCEDURE — 97110 THERAPEUTIC EXERCISES: CPT | Mod: GP

## 2024-02-21 NOTE — PROGRESS NOTES
"Physical Therapy Treatment      Patient Name: Marshall Padilla  MRN: [id number]  Today's Date: 2/21/2024  Visit #: 2  Insurance: 20vs/yr   Time Calculation  Start Time: 1015  Stop Time: 1111  Time Calculation (min): 56 min    1. Abnormality of gait  Follow Up In Physical Therapy      2. Balance problem  Follow Up In Physical Therapy      3. Decreased ROM of neck  Follow Up In Physical Therapy      4. Neck pain  Follow Up In Physical Therapy          ASSESSMENT:  Focus on hamstring activation in closed and open chain positions.  Challenged by all activities.  Educated on pace, performance, and purpose of each.    GOALS:  Improvement in gait mechanics without accessory / compensatory swing strategies for advancement of R LE.     Reciprocal stair navigation without vaulting of L LE     No falls during POC.     Improvements in strength of R hip, knee and ankle flexion by half a grade.     Subjective reports of improvements in posture.     Other goals to be updated throughout POC.    PLAN:  Continue with PT POC    SUBJECTIVE:  L flank and abdomen pain has gotten worse since last talked to this therapist.  As soon as eats and drinks gets full and painful feeling.  Waiting on approval for CT.  Been doing the bike \"really well.\"  Increasing weight on some exercises.  Been trying planks but that has been exacerbating pains.  Been doing the hamstring curls on the ball at home.      OBJECTIVE:      TREATMENT:      Therapeutic Exercise ([id number]): 56 minutes    Cybex Leg Curls -- 3 sets x 15, 30lbs, R LE only; 2 sets x 3, R LE negative only, 50lbs  RDLs -- 3 sets x 10 with 12lbs dumbbell   R Straight Leg Banded Hip Extension -- 3 sets x 10, red band  ^^ educated extensively on each exercise, proper position, and proper performance.  "

## 2024-03-05 ENCOUNTER — TELEPHONE (OUTPATIENT)
Dept: NEUROLOGY | Facility: HOSPITAL | Age: 59
End: 2024-03-05
Payer: COMMERCIAL

## 2024-03-05 NOTE — TELEPHONE ENCOUNTER
----- Message from Vern Patel MD sent at 3/1/2024  4:57 PM EST -----  Regarding: RE: Botox Right arm  25 units in the flexor digitorum superficialis and 25 units in the flexor digitorum profundus. I sometimes adjust the dose on the day of injections if exam changes compared to the initial assessment. The muscles and doses were already included in my note. Thanks.     Vern   ----- Message -----  From: Megha Cadet  Sent: 2/28/2024   3:47 PM EST  To: Vern Patel MD  Subject: RE: Botox Right arm                              I'm working the PA for [employer]. They want to know how many units are being injected into what muscle? They want exact dose in to exact muscle.  ----- Message -----  From: Vern Patel MD  Sent: 2/23/2024  11:34 AM EST  To: Megha Cadet  Subject: RE: Botox Right arm                              Please authorize him for 100 units. Dose and plan is already in my last note. Thanks.   ----- Message -----  From: Megha Cadet  Sent: 2/12/2024  11:51 AM EST  To: Vern Patel MD  Subject: Botox Right arm                                  Marshall Padilla called.  I scheduled him for Botox Right Arm, at his request. I don't see in your office note the amount or the preferred toxin. IF you can update your note to identify the medical necessity so I can do the PA that would be great. I offered Jelani or Mo. He chose to wait to see you.

## 2024-03-06 ENCOUNTER — TREATMENT (OUTPATIENT)
Dept: PHYSICAL THERAPY | Facility: CLINIC | Age: 59
End: 2024-03-06
Payer: COMMERCIAL

## 2024-03-06 DIAGNOSIS — R29.898 DECREASED ROM OF NECK: ICD-10-CM

## 2024-03-06 DIAGNOSIS — R26.89 BALANCE PROBLEM: ICD-10-CM

## 2024-03-06 DIAGNOSIS — R26.9 ABNORMALITY OF GAIT: ICD-10-CM

## 2024-03-06 DIAGNOSIS — M54.2 NECK PAIN: ICD-10-CM

## 2024-03-06 PROCEDURE — 97110 THERAPEUTIC EXERCISES: CPT | Mod: GP

## 2024-03-06 NOTE — PROGRESS NOTES
"Physical Therapy Treatment      Patient Name: Marshall Padilla  MRN: [id number]  Today's Date: 3/6/2024  Visit #: 3  Insurance: 20vs/yr   Time Calculation  Start Time: 1101  Stop Time: 1145  Time Calculation (min): 44 min    1. Abnormality of gait  Follow Up In Physical Therapy      2. Balance problem  Follow Up In Physical Therapy      3. Decreased ROM of neck  Follow Up In Physical Therapy      4. Neck pain  Follow Up In Physical Therapy            ASSESSMENT:  Focus on hamstring activation again this date.  It is apparent that the weakness in hamstrings cause his genu recurvatum.  Challenged by all activities.  Educated on pace, performance, and purpose of each.    GOALS:  Improvement in gait mechanics without accessory / compensatory swing strategies for advancement of R LE.     Reciprocal stair navigation without vaulting of L LE     No falls during POC.     Improvements in strength of R hip, knee and ankle flexion by half a grade.     Subjective reports of improvements in posture.     Other goals to be updated throughout POC.    PLAN:  Attempt modified Nordics    SUBJECTIVE:  Yesterday met with MS group and it went well.  Still having moderate-severe L flank pain.  Almost went to the ED on Monday, but it improved.  Hamstrings were very sore for a couple of days.  Been working on RDLs and extensions at home every couple of days.  Bike riding and weightlifting has been going well.    OBJECTIVE:      TREATMENT:      Therapeutic Exercise (36618):  minutes    Cybex Leg Curls -- 3 sets x 15, 30lbs, R LE only; 3 sets x 3, R LE negative only, 50lbs  L UE Supported R LE \"Plane\" with Glute Extension -- 3 sets x 6-8    *attempted rolling chair hamstring walks, but unable to  enough on this floor    Recall HEP:  RDLs, R Straight Leg Banded Hip Extension, Hamstring Curls on Ball  R Straight Leg Banded Hip Extension -- 3 sets x 10, red band  ^^ educated extensively on each exercise, proper position, and proper performance.  "

## 2024-03-20 ENCOUNTER — TREATMENT (OUTPATIENT)
Dept: PHYSICAL THERAPY | Facility: CLINIC | Age: 59
End: 2024-03-20
Payer: COMMERCIAL

## 2024-03-20 DIAGNOSIS — R26.89 BALANCE PROBLEM: ICD-10-CM

## 2024-03-20 DIAGNOSIS — M54.2 NECK PAIN: ICD-10-CM

## 2024-03-20 DIAGNOSIS — R29.898 DECREASED ROM OF NECK: ICD-10-CM

## 2024-03-20 DIAGNOSIS — R26.9 ABNORMALITY OF GAIT: ICD-10-CM

## 2024-03-20 PROCEDURE — 97110 THERAPEUTIC EXERCISES: CPT | Mod: GP

## 2024-03-20 NOTE — PROGRESS NOTES
Physical Therapy Treatment      Patient Name: Marshall Padilla  MRN: [id number]  Today's Date: 3/20/2024  Visit #: 4  Insurance: 20vs/yr   Time Calculation  Start Time: 1104  Stop Time: 1143  Time Calculation (min): 39 min    1. Abnormality of gait  Follow Up In Physical Therapy      2. Balance problem  Follow Up In Physical Therapy      3. Decreased ROM of neck  Follow Up In Physical Therapy      4. Neck pain  Follow Up In Physical Therapy              ASSESSMENT:  Focus on hamstring activation again this date.  Especially challenged by the Nordics and inchworms.    GOALS:  Improvement in gait mechanics without accessory / compensatory swing strategies for advancement of R LE.     Reciprocal stair navigation without vaulting of L LE     No falls during POC.     Improvements in strength of R hip, knee and ankle flexion by half a grade.     Subjective reports of improvements in posture.     Other goals to be updated throughout POC.    PLAN:  Attempt modified Nordics    SUBJECTIVE:  Been really busy, went to [place] for a few days.  Been exercising a lot with 26 days straight for exercise.  Feels like R arm and wrist is weak and hurts / burns.  Meets with c-spine doctor next week.  Met with GI doc and thinks may have an ulcer and has a scope scheduled for Tuesday morning.  Been working on exercises as performed     OBJECTIVE:      TREATMENT:      Therapeutic Exercise (25985): 39 minutes    Cybex Leg Curls -- 1 set x 15, 30lbs, 2 sets x 10, 35lbs R LE only; 3 sets x 10, 8, 7, R LE negative only, 50lbs  Nordics on Mat Table (held down by therapist) -- 3 sets x 5, mat table elevated  Hamstring Bridge Inchworms -- 3 sets x 3 -- challenged by R ankle position    *attempted rolling chair hamstring walks, but unable to  enough on this floor    Recall HEP:  RDLs, R Straight Leg Banded Hip Extension, Hamstring Curls on Ball  R Straight Leg Banded Hip Extension -- 3 sets x 10, red band  ^^ educated extensively on each exercise,  proper position, and proper performance.

## 2024-04-16 ENCOUNTER — TELEPHONE (OUTPATIENT)
Dept: NEUROLOGY | Facility: HOSPITAL | Age: 59
End: 2024-04-16
Payer: COMMERCIAL

## 2024-04-16 NOTE — TELEPHONE ENCOUNTER
----- Message from Vern Patel MD sent at 2/23/2024 11:34 AM EST -----  Regarding: RE: Botox Right arm  Please authorize him for 100 units. Dose and plan is already in my last note. Thanks.   ----- Message -----  From: Megha Cadet  Sent: 2/12/2024  11:51 AM EST  To: Vern Patel MD  Subject: Botox Right arm                                  Marshall LEILANI Valerie called.  I scheduled him for Botox Right Arm, at his request. I don't see in your office note the amount or the preferred toxin. IF you can update your note to identify the medical necessity so I can do the PA that would be great. I offered Jelani or Mo. He chose to wait to see you.

## 2024-04-17 ENCOUNTER — TREATMENT (OUTPATIENT)
Dept: PHYSICAL THERAPY | Facility: CLINIC | Age: 59
End: 2024-04-17
Payer: COMMERCIAL

## 2024-04-17 DIAGNOSIS — R26.9 ABNORMALITY OF GAIT: Primary | ICD-10-CM

## 2024-04-17 DIAGNOSIS — R26.89 BALANCE PROBLEM: ICD-10-CM

## 2024-04-17 DIAGNOSIS — R29.898 DECREASED ROM OF NECK: ICD-10-CM

## 2024-04-17 DIAGNOSIS — M54.2 NECK PAIN: ICD-10-CM

## 2024-04-17 PROCEDURE — 97110 THERAPEUTIC EXERCISES: CPT | Mod: GP

## 2024-04-17 NOTE — PROGRESS NOTES
"Physical Therapy Treatment      Patient Name: Marshall Padilla  MRN: [id number]  Today's Date: 4/17/2024  Visit #: 5  Insurance: 20vs/yr   Time Calculation  Start Time: 1102  Stop Time: 1200  Time Calculation (min): 58 min    1. Abnormality of gait        2. Balance problem        3. Decreased ROM of neck        4. Neck pain                  ASSESSMENT:  Much time spent in discussion surrounding R UE complaints.  Will be reducing resistance, doing more dowel rusty activities, desensitization, and forearm pronation / supination with 1lb dumbbell.  Receptive to this.    GOALS:  Improvement in gait mechanics without accessory / compensatory swing strategies for advancement of R LE.     Reciprocal stair navigation without vaulting of L LE     No falls during POC.     Improvements in strength of R hip, knee and ankle flexion by half a grade.     Subjective reports of improvements in posture.     Other goals to be updated throughout POC.    PLAN:  Attempt modified Nordics    SUBJECTIVE:  Been wearing \"foot lift\" when doing yardwork.  Tried riding bike a little bit and putting it up realized that R shoulder / arm \"doesn't do anything.\" R arm distal to wrist has gotten worse.  All his doctor's appointments got canceled, so had to be on a PPI to see if that would help first prior to getting endoscopy and colonoscopy.  Knows that has lost weight.  Feels like just lots of ups and downs.  C-spine doctor canceled.      OBJECTIVE:      TREATMENT:      Therapeutic Exercise ([id number]): 40 minutes    Discussed at length HEP and symptoms.  Encouraged to reduce resistance exercises, increase dowel activities, encouraged performance of desensitization activities, and pronation / supination with 1lb dumbbell.    Recall HEP:  RDLs, R Straight Leg Banded Hip Extension, Hamstring Curls on Ball  R Straight Leg Banded Hip Extension -- 3 sets x 10, red band  ^^ educated extensively on each exercise, proper position, and proper performance.  "

## 2024-04-18 ENCOUNTER — TELEPHONE (OUTPATIENT)
Dept: NEUROLOGY | Facility: HOSPITAL | Age: 59
End: 2024-04-18
Payer: COMMERCIAL

## 2024-04-18 DIAGNOSIS — G35 MULTIPLE SCLEROSIS (MULTI): ICD-10-CM

## 2024-04-18 RX ORDER — GABAPENTIN 300 MG/1
300 CAPSULE ORAL 2 TIMES DAILY
Qty: 180 CAPSULE | Refills: 1 | Status: SHIPPED | OUTPATIENT
Start: 2024-04-18

## 2024-04-18 NOTE — TELEPHONE ENCOUNTER
Marshall Padilla called.  He would like to request a refill of Gabapentin 300mg 1 BID #180 with [facility]. He is out of medication.

## 2024-05-02 ENCOUNTER — TREATMENT (OUTPATIENT)
Dept: PHYSICAL THERAPY | Facility: CLINIC | Age: 59
End: 2024-05-02
Payer: COMMERCIAL

## 2024-05-02 DIAGNOSIS — R26.89 BALANCE PROBLEM: ICD-10-CM

## 2024-05-02 DIAGNOSIS — R26.9 ABNORMALITY OF GAIT: Primary | ICD-10-CM

## 2024-05-02 DIAGNOSIS — R29.898 DECREASED ROM OF NECK: ICD-10-CM

## 2024-05-02 DIAGNOSIS — M54.2 NECK PAIN: ICD-10-CM

## 2024-05-02 PROCEDURE — 97110 THERAPEUTIC EXERCISES: CPT | Mod: GP

## 2024-05-02 PROCEDURE — 97112 NEUROMUSCULAR REEDUCATION: CPT | Mod: GP

## 2024-05-02 NOTE — PROGRESS NOTES
"Physical Therapy Treatment      Patient Name: Marshall Padilla  MRN: [id number]  Today's Date: 5/2/2024  Visit #: 6  Insurance: 20vs/yr   Time Calculation  Start Time: 1047  Stop Time: 1114  Time Calculation (min): 27 min    1. Abnormality of gait        2. Balance problem        3. Decreased ROM of neck        4. Neck pain                    ASSESSMENT:  Focus on R LE at this time.  Challenged d/t profound weakness in R DF and R HS.  Use of NMES to assist in R AT recruitment.  No adverse events.    GOALS:  Improvement in gait mechanics without accessory / compensatory swing strategies for advancement of R LE.     Reciprocal stair navigation without vaulting of L LE     No falls during POC.     Improvements in strength of R hip, knee and ankle flexion by half a grade.     Subjective reports of improvements in posture.     Other goals to be updated throughout POC.    PLAN:  Continue with PT POC    SUBJECTIVE:  Been doing the dowel stuff \"a lot.\"  Been mowing the lawn a lot also.      OBJECTIVE:      TREATMENT:    NMR (99630): 15 minutes    Step Up with Exaggerated Lunge and Eccentric Control with NMES (300usec, 40Hz, 43mAcc) -- 3 sets x 10 -- performing for NMR      Therapeutic Exercise (16146): 12 minutes     Cybex Leg Curls -- 3 sets x 10, 35lbs R LE only -- performing to encourage recruitment of R HS.    Recall HEP:  RDLs, R Straight Leg Banded Hip Extension, Hamstring Curls on Ball  R Straight Leg Banded Hip Extension -- 3 sets x 10, red band  ^^ educated extensively on each exercise, proper position, and proper performance.  "

## 2024-05-15 ENCOUNTER — APPOINTMENT (OUTPATIENT)
Dept: PHYSICAL THERAPY | Facility: CLINIC | Age: 59
End: 2024-05-15
Payer: COMMERCIAL

## 2024-05-20 ENCOUNTER — TREATMENT (OUTPATIENT)
Dept: PHYSICAL THERAPY | Facility: CLINIC | Age: 59
End: 2024-05-20
Payer: COMMERCIAL

## 2024-05-20 DIAGNOSIS — M54.2 NECK PAIN: ICD-10-CM

## 2024-05-20 DIAGNOSIS — R26.9 ABNORMALITY OF GAIT: Primary | ICD-10-CM

## 2024-05-20 DIAGNOSIS — R29.898 DECREASED ROM OF NECK: ICD-10-CM

## 2024-05-20 DIAGNOSIS — R26.89 BALANCE PROBLEM: ICD-10-CM

## 2024-05-20 PROCEDURE — 97110 THERAPEUTIC EXERCISES: CPT | Mod: GP

## 2024-05-20 PROCEDURE — 97112 NEUROMUSCULAR REEDUCATION: CPT | Mod: GP

## 2024-05-20 NOTE — PROGRESS NOTES
Physical Therapy Treatment      Patient Name: Marshall Padilla  MRN: [id number]  Today's Date: 5/20/2024  Visit #: 7  Insurance: 20vs/yr   Time Calculation  Start Time: 0947  Stop Time: 1029  Time Calculation (min): 42 min    1. Abnormality of gait        2. Balance problem        3. Decreased ROM of neck        4. Neck pain                      ASSESSMENT:  Focus on R LE again this date.  Continues to be challenged by profound weakness in R DF and R HS and hip flexor.  Use of NMES to assist in R AT recruitment.  No adverse events.    GOALS:  Improvement in gait mechanics without accessory / compensatory swing strategies for advancement of R LE.     Reciprocal stair navigation without vaulting of L LE     No falls during POC.     Improvements in strength of R hip, knee and ankle flexion by half a grade.     Subjective reports of improvements in posture.     Other goals to be updated throughout POC.    PLAN:  Continue with PT POC    SUBJECTIVE:  Endoscopy finally approved and will be tomorrow.  R arm has been continued fluctuating discomfort.  Met with cervical spine doctor who is going to give a pain block at C7 to see if it affects R arm.  Looked at neck MRI and thinks that biggest issue is coming from myelomalacia.  They will be talking further.  Had xray that confirmed S curve thoracolumbar scoliosis.  Still doing a lot of dowel.  R hip discomfort.      OBJECTIVE:      TREATMENT:    NMR (83061): 22 minutes    Step with Lunge to Incline Board with NMES (300usec, 40Hz, 42mAcc, 5 on/of) -- 3 sets x 10 -- performing for NMR    Therapeutic Exercise (15567): 30 minutes     Cybex Leg Curls -- 3 sets x 10, 35lbs; 1 set x fatigue, 5lbs R LE only -- performing to encourage recruitment of R HS.    R Hamstring Curls on Ball -- 2 sets x 8 -- performing for R hip     Recall HEP:  RDLs, R Straight Leg Banded Hip Extension, Hamstring Curls on Ball  R Straight Leg Banded Hip Extension -- 3 sets x 10, red band  ^^ educated extensively on  each exercise, proper position, and proper performance.

## 2024-06-04 DIAGNOSIS — Z79.899 DRUG-INDUCED IMMUNODEFICIENCY (MULTI): Primary | ICD-10-CM

## 2024-06-04 DIAGNOSIS — D84.821 DRUG-INDUCED IMMUNODEFICIENCY (MULTI): Primary | ICD-10-CM

## 2024-06-04 DIAGNOSIS — G35 MULTIPLE SCLEROSIS (MULTI): ICD-10-CM

## 2024-06-04 RX ORDER — DIPHENHYDRAMINE HCL 25 MG
25 TABLET ORAL ONCE
OUTPATIENT
Start: 2024-07-12

## 2024-06-04 RX ORDER — EPINEPHRINE 0.3 MG/.3ML
0.3 INJECTION SUBCUTANEOUS EVERY 5 MIN PRN
OUTPATIENT
Start: 2024-07-12

## 2024-06-04 RX ORDER — ACETAMINOPHEN 325 MG/1
650 TABLET ORAL ONCE
OUTPATIENT
Start: 2024-07-12

## 2024-06-04 RX ORDER — DIPHENHYDRAMINE HYDROCHLORIDE 50 MG/ML
50 INJECTION INTRAMUSCULAR; INTRAVENOUS AS NEEDED
OUTPATIENT
Start: 2024-07-12

## 2024-06-04 RX ORDER — ALBUTEROL SULFATE 0.83 MG/ML
3 SOLUTION RESPIRATORY (INHALATION) AS NEEDED
OUTPATIENT
Start: 2024-07-12

## 2024-06-04 RX ORDER — FAMOTIDINE 10 MG/ML
20 INJECTION INTRAVENOUS ONCE AS NEEDED
OUTPATIENT
Start: 2024-07-12

## 2024-06-13 ENCOUNTER — TREATMENT (OUTPATIENT)
Dept: PHYSICAL THERAPY | Facility: CLINIC | Age: 59
End: 2024-06-13
Payer: COMMERCIAL

## 2024-06-13 DIAGNOSIS — R26.89 BALANCE PROBLEM: ICD-10-CM

## 2024-06-13 DIAGNOSIS — M54.2 NECK PAIN: ICD-10-CM

## 2024-06-13 DIAGNOSIS — R29.898 DECREASED ROM OF NECK: ICD-10-CM

## 2024-06-13 DIAGNOSIS — R26.9 ABNORMALITY OF GAIT: Primary | ICD-10-CM

## 2024-06-13 PROCEDURE — 97530 THERAPEUTIC ACTIVITIES: CPT | Mod: GP

## 2024-06-13 PROCEDURE — 97110 THERAPEUTIC EXERCISES: CPT | Mod: GP

## 2024-06-13 NOTE — PROGRESS NOTES
Physical Therapy Treatment      Patient Name: Marshall Padilla  MRN: [id number]  Today's Date: 6/13/2024  Visit #: 8  Insurance: 20vs/yr   Time Calculation  Start Time: 1000  Stop Time: 1045  Time Calculation (min): 45 min    1. Abnormality of gait        2. Balance problem        3. Decreased ROM of neck        4. Neck pain                        ASSESSMENT:  R hip and L knee investigated this date.  Given HEP to begin addressing and intervening upon this.  Receptive to all changes.  No adverse events.    GOALS:  Improvement in gait mechanics without accessory / compensatory swing strategies for advancement of R LE.     Reciprocal stair navigation without vaulting of L LE     No falls during POC.     Improvements in strength of R hip, knee and ankle flexion by half a grade.     Subjective reports of improvements in posture.     Other goals to be updated throughout POC.    PLAN:  Continue with PT POC    SUBJECTIVE:    R hip and L knee bothering since did a lot of deck painting.  Feels the worst that has in a few weeks.  Had a pain block two days ago, and no difference in anything.  Feels like walking and strength are declining.  Has been using the dowel.  Stairs have been difficult because of R hip.  Endoscopy came back okay where was told had hiatal hernia.  Everything else looks okay.  Hasn't been bothering him as much.  Knows food intake has decreased significantly over time.  Tomorrow leaves for [place] in [facility].        OBJECTIVE:      TREATMENT:    Therapeutic Activity (45621):  15 minutes    Stair Navigation:  poor R hip flexion, severe vaulting of L LE to advance R LE with ascent  R Hip Assessment:  pain with hip flexion and adduction, one episode of popping in R hip.  Pain with eccentric lowering in front   L Knee Assessment:  Joint line tenderness on medial aspect of L knee     Discussed all findings    Therapeutic Exercise (47430): 30 minutes     Given all below for HEP  R Hip Flexor Isometric -- 5 x 5  second hold  Clamshells -- 2 sets x 15  Bridges -- 2 sets x 10-15 with few second holds  Emanuel Modified Stretch -- 2 sets x 60 seconds with help

## 2024-06-19 ENCOUNTER — TELEPHONE (OUTPATIENT)
Dept: NEUROLOGY | Facility: CLINIC | Age: 59
End: 2024-06-19
Payer: COMMERCIAL

## 2024-06-19 NOTE — TELEPHONE ENCOUNTER
Marshall Padilla called. He would like to request a NEW script for Gabapentin 300mg. He stated you recently increased his dose to TID rather than BID.  [facility]) Gabapentin 300mg 1  cap TID #270 with refills .

## 2024-06-20 DIAGNOSIS — G35 MULTIPLE SCLEROSIS (MULTI): Primary | ICD-10-CM

## 2024-06-20 RX ORDER — GABAPENTIN 300 MG/1
300 CAPSULE ORAL 3 TIMES DAILY
Qty: 180 CAPSULE | Refills: 1 | Status: SHIPPED | OUTPATIENT
Start: 2024-06-20

## 2024-06-26 ENCOUNTER — TREATMENT (OUTPATIENT)
Dept: PHYSICAL THERAPY | Facility: CLINIC | Age: 59
End: 2024-06-26
Payer: COMMERCIAL

## 2024-06-26 DIAGNOSIS — M54.2 NECK PAIN: ICD-10-CM

## 2024-06-26 DIAGNOSIS — R26.89 BALANCE PROBLEM: ICD-10-CM

## 2024-06-26 DIAGNOSIS — R29.898 DECREASED ROM OF NECK: ICD-10-CM

## 2024-06-26 DIAGNOSIS — R26.9 ABNORMALITY OF GAIT: Primary | ICD-10-CM

## 2024-06-26 PROCEDURE — 97110 THERAPEUTIC EXERCISES: CPT | Mod: GP

## 2024-06-26 NOTE — PROGRESS NOTES
Physical Therapy Treatment      Patient Name: Marshall Padilla  MRN: [id number]  Today's Date: 6/26/2024  Visit #: 9  Insurance: 20vs/yr   Time Calculation  Start Time: 1018  Stop Time: 1102  Time Calculation (min): 44 min    1. Abnormality of gait        2. Balance problem        3. Decreased ROM of neck        4. Neck pain                          ASSESSMENT:  It is promising that patient is noticing an improvement in discomfort and function of R hip since HEP given.  Updates made at this time.  No adverse events.    GOALS:  Improvement in gait mechanics without accessory / compensatory swing strategies for advancement of R LE.     Reciprocal stair navigation without vaulting of L LE     No falls during POC.     Improvements in strength of R hip, knee and ankle flexion by half a grade.     Subjective reports of improvements in posture.     Other goals to be updated throughout POC.    PLAN:  Continue with PT POC    SUBJECTIVE:    Super busy with appointments this week.  Has an EMG on R LE tomorrow.  MRI of brain on Friday.  Has an infusion next month.   R hip is improving to where doesn't notice it during walking.  Stairs aren't painful, just weak.  L knee is still painful.  [place] driving [place] and R arm is sore.  Tried some bench pressing but noticed went into extreme wrist extension.      OBJECTIVE:      TREATMENT:    Therapeutic Activity (41975):  4 minutes    Stair Navigation:  improved R hip flexion, but still vaulting with L LE to help advance R LE up stairs, decreased eccentric control with lowering on R LE and with stepping on R LE  R Ankle DF ROM Assessment: demoing limitation of R LE as compared to L LE    Discussed these findings and their implications on mobility    Therapeutic Exercise (10228): 40 minutes     Updates made to HEP:  R Hip Flexor Isometric -- 5 x 5 second hold -- DISCONTINUE  Emanuel Modified Stretch -- 2 sets x 60 seconds with help -- DISCONTINUE    Clamshells -- 2 sets x  15  Bridges -- 2 sets x 10-15 with few second holds    Added in:  Supine R LE Hip Flexion -- 2 sets x 15  Supine R LE March -- 2 sets x 15  Seated L Hip Abduction -- black band, 3 sets x 15  ^^ discussed pace, performance, and purpose of each.  Attempted other exercises, these chosen as most-optimal.

## 2024-06-28 ENCOUNTER — HOSPITAL ENCOUNTER (OUTPATIENT)
Dept: RADIOLOGY | Facility: CLINIC | Age: 59
Discharge: HOME | End: 2024-06-28
Payer: COMMERCIAL

## 2024-06-28 DIAGNOSIS — G35 MULTIPLE SCLEROSIS (MULTI): ICD-10-CM

## 2024-06-28 PROCEDURE — 70553 MRI BRAIN STEM W/O & W/DYE: CPT

## 2024-06-28 PROCEDURE — 2500000004 HC RX 250 GENERAL PHARMACY W/ HCPCS (ALT 636 FOR OP/ED): Performed by: PSYCHIATRY & NEUROLOGY

## 2024-06-28 PROCEDURE — A9575 INJ GADOTERATE MEGLUMI 0.1ML: HCPCS | Performed by: PSYCHIATRY & NEUROLOGY

## 2024-06-28 RX ORDER — GADOTERATE MEGLUMINE 376.9 MG/ML
0.2 INJECTION INTRAVENOUS
Status: COMPLETED | OUTPATIENT
Start: 2024-06-28 | End: 2024-06-28

## 2024-07-12 ENCOUNTER — APPOINTMENT (OUTPATIENT)
Dept: INFUSION THERAPY | Facility: CLINIC | Age: 59
End: 2024-07-12
Payer: COMMERCIAL

## 2024-07-12 VITALS
RESPIRATION RATE: 16 BRPM | SYSTOLIC BLOOD PRESSURE: 132 MMHG | TEMPERATURE: 97 F | HEART RATE: 75 BPM | OXYGEN SATURATION: 99 % | DIASTOLIC BLOOD PRESSURE: 75 MMHG | BODY MASS INDEX: 25.86 KG/M2 | WEIGHT: 206.9 LBS

## 2024-07-12 DIAGNOSIS — D84.821 DRUG-INDUCED IMMUNODEFICIENCY (MULTI): ICD-10-CM

## 2024-07-12 DIAGNOSIS — G35 MULTIPLE SCLEROSIS (MULTI): ICD-10-CM

## 2024-07-12 DIAGNOSIS — Z79.899 DRUG-INDUCED IMMUNODEFICIENCY (MULTI): ICD-10-CM

## 2024-07-12 LAB
BASOPHILS # BLD AUTO: 0.05 X10*3/UL (ref 0–0.1)
BASOPHILS NFR BLD AUTO: 1.1 %
EOSINOPHIL # BLD AUTO: 0.14 X10*3/UL (ref 0–0.7)
EOSINOPHIL NFR BLD AUTO: 3.1 %
ERYTHROCYTE [DISTWIDTH] IN BLOOD BY AUTOMATED COUNT: 12.9 % (ref 11.5–14.5)
HCT VFR BLD AUTO: 41.3 % (ref 41–52)
HGB BLD-MCNC: 13.5 G/DL (ref 13.5–17.5)
IGA SERPL-MCNC: 51 MG/DL (ref 70–400)
IGG SERPL-MCNC: 588 MG/DL (ref 700–1600)
IGM SERPL-MCNC: 43 MG/DL (ref 40–230)
IMM GRANULOCYTES # BLD AUTO: 0.01 X10*3/UL (ref 0–0.7)
IMM GRANULOCYTES NFR BLD AUTO: 0.2 % (ref 0–0.9)
LYMPHOCYTES # BLD AUTO: 1.09 X10*3/UL (ref 1.2–4.8)
LYMPHOCYTES NFR BLD AUTO: 24.5 %
MCH RBC QN AUTO: 30.7 PG (ref 26–34)
MCHC RBC AUTO-ENTMCNC: 32.7 G/DL (ref 32–36)
MCV RBC AUTO: 94 FL (ref 80–100)
MONOCYTES # BLD AUTO: 0.35 X10*3/UL (ref 0.1–1)
MONOCYTES NFR BLD AUTO: 7.9 %
NEUTROPHILS # BLD AUTO: 2.81 X10*3/UL (ref 1.2–7.7)
NEUTROPHILS NFR BLD AUTO: 63.2 %
NRBC BLD-RTO: 0 /100 WBCS (ref 0–0)
PLATELET # BLD AUTO: 207 X10*3/UL (ref 150–450)
RBC # BLD AUTO: 4.4 X10*6/UL (ref 4.5–5.9)
WBC # BLD AUTO: 4.5 X10*3/UL (ref 4.4–11.3)

## 2024-07-12 PROCEDURE — 88184 FLOWCYTOMETRY/ TC 1 MARKER: CPT

## 2024-07-12 PROCEDURE — 82784 ASSAY IGA/IGD/IGG/IGM EACH: CPT

## 2024-07-12 PROCEDURE — 88185 FLOWCYTOMETRY/TC ADD-ON: CPT

## 2024-07-12 PROCEDURE — 85025 COMPLETE CBC W/AUTO DIFF WBC: CPT

## 2024-07-12 PROCEDURE — 36415 COLL VENOUS BLD VENIPUNCTURE: CPT

## 2024-07-12 RX ORDER — ALBUTEROL SULFATE 0.83 MG/ML
3 SOLUTION RESPIRATORY (INHALATION) AS NEEDED
OUTPATIENT
Start: 2025-01-11

## 2024-07-12 RX ORDER — DIPHENHYDRAMINE HYDROCHLORIDE 50 MG/ML
50 INJECTION INTRAMUSCULAR; INTRAVENOUS AS NEEDED
OUTPATIENT
Start: 2025-01-11

## 2024-07-12 RX ORDER — ACETAMINOPHEN 325 MG/1
650 TABLET ORAL ONCE
OUTPATIENT
Start: 2025-01-11

## 2024-07-12 RX ORDER — EPINEPHRINE 0.3 MG/.3ML
0.3 INJECTION SUBCUTANEOUS EVERY 5 MIN PRN
OUTPATIENT
Start: 2025-01-11

## 2024-07-12 RX ORDER — DIPHENHYDRAMINE HCL 25 MG
25 CAPSULE ORAL ONCE
Status: COMPLETED | OUTPATIENT
Start: 2024-07-12 | End: 2024-07-12

## 2024-07-12 RX ORDER — ACETAMINOPHEN 325 MG/1
650 TABLET ORAL ONCE
Status: COMPLETED | OUTPATIENT
Start: 2024-07-12 | End: 2024-07-12

## 2024-07-12 RX ORDER — FAMOTIDINE 10 MG/ML
20 INJECTION INTRAVENOUS ONCE AS NEEDED
OUTPATIENT
Start: 2025-01-11

## 2024-07-12 RX ORDER — DIPHENHYDRAMINE HCL 25 MG
25 CAPSULE ORAL ONCE
OUTPATIENT
Start: 2025-01-11

## 2024-07-12 ASSESSMENT — ENCOUNTER SYMPTOMS
ENDOCRINE NEGATIVE: 1
NUMBNESS: 1
RESPIRATORY NEGATIVE: 1
PSYCHIATRIC NEGATIVE: 1
EYES NEGATIVE: 1
GASTROINTESTINAL NEGATIVE: 1
CARDIOVASCULAR NEGATIVE: 1
HEMATOLOGIC/LYMPHATIC NEGATIVE: 1
FATIGUE: 1

## 2024-07-12 NOTE — PROGRESS NOTES
[facility]   Infusion Clinic Note   Date: 2024   Name: Marshall Padilla  : 1965   MRN: [id number]         Reason for Visit: OP Infusion (PATIENT HERE FOR HIS OCREVUS 600MG INFUSION Q6 MONTHS )         Today: We administered acetaminophen, methylPREDNISolone sod succinate, diphenhydrAMINE, and ocrelizumab (Ocrevus) 600 mg in sodium chloride 0.9% 520 mL IV.       Visit Type: INFUSION       Ordered By: GOYO MELLO MD      Accompanied by:Self      Diagnosis: Drug-induced immunodeficiency (Multi)    Multiple sclerosis (Multi)       Allergies:   Allergies as of 2024 - Reviewed 2024   Allergen Reaction Noted    Grass pollen Itching and Runny nose 1985         Current Medications has a current medication list which includes the following prescription(s): acetaminophen, albuterol, atorvastatin, baclofen, cholecalciferol, cyanocobalamin (vitamin b-12), gabapentin, ocrelizumab, and tizanidine.       Vitals:   Vitals:    24 1004 24 1131 24 1300   BP: 149/81 127/82 132/75   Pulse: 67 62 75   Resp: 18 16 16   Temp: 36.7 °C (98 °F)  36.1 °C (97 °F)   SpO2: 97% 99% 99%   Weight: 93.9 kg (206 lb 14.4 oz)               Infusion Pre-procedure Checklist:   - Allergies reviewed: yes   - Medications reviewed: yes       - Previous reaction to current treatment: no      Assess patient for the concerns below. Document provider notification as appropriate.  - Active or recent infection with/without current antibiotic use: no  - Recent or planned invasive dental work: no  - Recent or planned surgeries: no  - Recently received or plans to receive vaccinations: no  - Has treatment related toxicities: no  - Is pregnant:  no      Pain: 0   - Is the pain different from normal: n/a   - Is the pain tolerable: n/a   - Is your Doctor aware:  n/a      Labs: Labs drawn and sent per order         Fall Risk Screening: Flaca Fall Risk  History of Falling, Immediate or Within 3  "Months: No  Secondary Diagnosis: No  Ambulatory Aid: Walks without aid/bedrest/nurse assist  Intravenous Therapy/Heparin Lock: No  Gait/Transferring: Normal/bedrest/immobile  Mental Status: Oriented to own ability  Thayer Fall Risk Score: 0       Review Of Systems:  Review of Systems   Constitutional:  Positive for fatigue.   HENT:  Negative.     Eyes: Negative.    Respiratory: Negative.     Cardiovascular: Negative.    Gastrointestinal: Negative.    Endocrine: Negative.    Genitourinary: Negative.     Musculoskeletal:  Positive for gait problem.   Skin: Negative.    Neurological:  Positive for gait problem and numbness.   Hematological: Negative.    Psychiatric/Behavioral: Negative.           Infusion Readiness:   - Assessment Concerns Related to Infusion: No  - Provider notified: n/a      Document Below Only If Indicated:   New Patient Education:    N/A (returning patient for continuation of therapy. Ongoing education provided as needed.)        Treatment Conditions & Drug Specific Questions:    Ocrelizumab  (OCREVUS)    (Unless otherwise specified on patient specific therapy plan):     TREATMENT CONDITIONS:  Unless otherwise specified on patient specific therapy plan HOLD and notify provider prior to proceeding with today's infusion if patient with:  o Positive Hepatitis B Surface Ag or panel  o Positive T-Spot    No results found for: \"TBSIN\", \"TBGRES\", \"QFG\", \"TSPOTR\"   No results found for: \"HAGCN\", \"HEPBSURABI\", \"HBSAG\", \"XHAGF\", \"HEPBSAG\", \"EXTHEPBSAG\", \"NONUHSWGH\"   No results found for: \"HEPATOT\", \"HEPAIGM\", \"HEPBCIGM\", \"HEPBCAB\", \"HBEAG\", \"HEPCAB\"   No results found for: \"NONUHFIRE\", \"NONUHSWGH\", \"NONUHFISH\", \"EXTHEPBSAG\"    Labs reviewed and patient meets treatment conditions? Yes    DRUG SPECIFIC QUESTIONS:  Any signs or symptoms of Progressive multifocal leukoencephalopathy (PML) which may include: memory loss, trouble thinking, dizziness, loss of balance, difficulty talking / walking or loss of vision? " No    (If YES HOLD and notify provider)      REMINDER:  Recommended Vitals/Observation:  Vitals: Obtain vital signs every 30 minutes / with each ramp up. Once maximum rate is reached obtain vitals hourly until infusion is complete. Obtain vitals at end of observation period and PRN.  Observation: Observe patient for 60 minutes after each infusion.        Weight Based Drug Calculations:    WEIGHT BASED DRUGS: NOT APPLICABLE / FLAT DOSE          Initiated By: Berenice Aguilar RN

## 2024-07-12 NOTE — PATIENT INSTRUCTIONS
Today you received:  OCREVUS 600MG INFUSION Q6 MONTHS   PREMEDS: APAP 650MG PO/BENADRYL 25MG PO/SOLUMEDROL 100MG IVP    For:    1. Drug-induced immunodeficiency (Multi)    2. Multiple sclerosis (Multi)            Please read the  Medication Guide that was given to you and reviewed during todays visit.     (Tell all doctors including dentists that you are taking this medication)     Go to the emergency room or call 911 if:  -You have signs of allergic reaction:   o         Rash, hives, itching.   o         Swollen, blistered, peeling skin.   o         Swelling of face, lips, mouth, tongue or throat.   o         Tightness of chest, trouble breathing, swallowing or talking      Call your doctor:     - If IV / injection site gets red, warm, swollen, itchy or leaks fluid or pus.     (Leave dressing on your IV site for at least 2 hours and keep area clean and dry    - If you get sick or have symptoms of infection or are not feeling well for any reason.    (Wash your hands often, stay away from people who are sick)    - If you have side effects from your medication that do not go away or are bothersome.     (Refer to the teaching your nurse gave you for side effects to call your doctor about)     Common side effects may include:  stuffy nose, headache, feeling tired, muscle aches, upset stomach    - Before receiving any vaccines, Call the [facility] at [phone]     - You get sick, are on antibiotics, have had a recent vaccine, have surgery or dental work and your doctor wants your visit rescheduled.    - You need to cancel and reschedule your visit for any reason. Call at least 2 days before your visit if you need to cancel.     - Your insurance changes before your next visit.    (We will need to get approval from your new insurance. This can take up to two weeks.)     The [facility] is opened Monday thru Friday 8am-8pm and Saturday 8am-4:30pm. We are closed on holidays.     Voice  mail will take your call if the center is closed. If you leave a message please allow 24 hours for a call back during weekdays. If you leave a message on a weekend/holiday, we will call you back the next business day.

## 2024-07-15 LAB
CD19 CELLS # BLD: 0 X10E9/L
CD19 CELLS NFR BLD: 0 %
FLOW CYTOMETRY SPECIALIST REVIEW: ABNORMAL
LYMPHOCYTES # SPEC AUTO: 1.09 X10*3/UL
PATH REVIEW, B CELL PHENOTYPING, EXTENDED: ABNORMAL

## 2024-07-16 ENCOUNTER — OFFICE VISIT (OUTPATIENT)
Dept: NEUROLOGY | Facility: HOSPITAL | Age: 59
End: 2024-07-16
Payer: COMMERCIAL

## 2024-07-16 VITALS
SYSTOLIC BLOOD PRESSURE: 136 MMHG | DIASTOLIC BLOOD PRESSURE: 70 MMHG | BODY MASS INDEX: 25.36 KG/M2 | WEIGHT: 204 LBS | HEART RATE: 73 BPM | HEIGHT: 75 IN | TEMPERATURE: 97.1 F | RESPIRATION RATE: 18 BRPM

## 2024-07-16 DIAGNOSIS — G35 MULTIPLE SCLEROSIS (MULTI): Primary | ICD-10-CM

## 2024-07-16 PROCEDURE — 99215 OFFICE O/P EST HI 40 MIN: CPT | Performed by: PSYCHIATRY & NEUROLOGY

## 2024-07-16 PROCEDURE — 1036F TOBACCO NON-USER: CPT | Performed by: PSYCHIATRY & NEUROLOGY

## 2024-07-16 RX ORDER — TIZANIDINE 2 MG/1
4 TABLET ORAL NIGHTLY
Qty: 60 TABLET | Refills: 6 | Status: SHIPPED | OUTPATIENT
Start: 2024-07-16

## 2024-07-16 RX ORDER — PANTOPRAZOLE SODIUM 40 MG/1
1 TABLET, DELAYED RELEASE ORAL
COMMUNITY
Start: 2024-05-26

## 2024-07-16 ASSESSMENT — PAIN SCALES - GENERAL: PAINLEVEL: 0-NO PAIN

## 2024-07-16 NOTE — PROGRESS NOTES
Chief Complaint  PPMS.      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 known: PPMS with activity  Date of onset: Incidentally found 6/2017, clinical onset 3/2018  Date of diagnosis: 10/19/2017 radiological diagnosis, 05/15/2018 clinical diagnosis  disease course at onset: progressive relapsing   disease course now: progressive relapsing   Current DMT: Ocrevus since June 2018  Previous DMT: none   Last MRI brain: 7/2024  Last MRI cervical: 11/2023  Last MRI thoracic: 11/2023  Last VEP: negative in 8/2017  CSF: done 9/2017 normal CSF, negative OCBs and IGG index  JCV: positive 0.6 9/2017  VZV: positive 9/2017  HEP panel: negative 9/2017  NMO: negative 9/2017     This is a 51 year old right handed White male patient who presented initially with right arm pain in the setting of significant C5-6 disc prolapse with radiculomyelopathy and an incidental finding of an unrelated demyelinating -like plaque at the CMJ. brain MRI showed only non specific deep white matter hyperintense lesions. He underwent a successful cervical decompression surgery with Dr. Willis in October 2017 and his RUE improved. LP was negative for MS markers but he started having progressive RLE weakness in March 2018. Repeat imaging showed a new enhancing lesion at T4 on the right side. A diagnosis of PPMS with activity and progression was made.      INTERVAL HISTORY:  Continues to struggle with worsening spasticity of the right hemibody including leaning to one side with truncal indentation on the left. No GI cause was found. His right hand continues to get weaker and his right arm and fingers continue to be semiflexed while walking. Had another EMG at [facility] that was reportedly normal. Still not decided on botox. Still experiencing neuropathic pain in the RUE. MRI stable. IGG still low but ALC normal. No infections.      MS symptom review:     weakness: yes RLE and RUE   sensory  changes: yes presenting symptom, RUE  incoordination: no  falling: no  gait change: yes, limp  painful vision loss: no  double vision: no  vertigo: no  facial/bulbar weakness: no but had facial numbness and twitching, resolved  Lhermitte's: no  bladder/bowel dysfunction: urinary frequency      spasticity: yes, right leg now feels heavier and circumducts while walking, improved after baclofen.   tonic spasms: yes RUE isometric painful triggered by lying down at night. almost daily.   tremors: no  RLS: no  dystonia: no  other movement disorders: no     heat sensitivity: yes especially during work ()   fatigue: yes  depression: no  anxiety: yes major   cognitive changes: nothing major     DMT: Ocrevus as of 6/2018  Side effects: No     Vitamin D: 5000 units daily  symptomatic: baclofen 40 mg, helpful. Gabapentin. Amantadine is prescribed but he never took it.               Review of Systems  All systems reviewed and are negative except as mentioned in the HPI.        Active Problems  Problems    · Abnormal MRI, cervical spine (793.7) (R93.7)   · Abnormality of gait (781.2) (R26.9)   · Arm pain (729.5) (M79.603)   · right   · Balance problem (781.99) (R26.89)   · Cervical disc disease with myelopathy (722.71) (M50.00)   · Cervical myelopathy (721.1) (G95.9)   · Cervical radiculopathy (723.4) (M54.12)   · Chronic sinusitis (473.9) (J32.9)   · Decreased coordination (781.3) (R27.8)   · Decreased ROM of neck (723.8) (R29.898)   · Decreased strength, endurance, and mobility (780.79,780.99) (R53.1,R68.89,Z74.09)   · Depression with anxiety (300.4) (F41.8)   · Dizziness (780.4) (R42)   · Encounter for fitness for duty examination (V70.5) (Z02.89)   · Grippy cold (487.1) (J11.1)   · Immunosuppression (279.9) (D84.9)   · Multiple sclerosis, primary progressive (340) (G35)   · Myelitis (323.9) (G04.91)   · Neck pain (723.1) (M54.2)   · Neck stiffness (723.5) (M43.6)   · Numbness of arm (782.0) (R20.0)   · Otitis  externa (380.10) (H60.90)   · Pain of right upper extremity (729.5) (M79.601)   · Paresthesia of arm (782.0) (R20.2)   · Paresthesias/numbness (782.0) (R20.9)   · Pins and needles sensation (782.0) (R20.2)   · right arm   · Right arm weakness (729.89) (R29.898)   · Right-sided muscle weakness (728.87) (M62.81)   · S/P cervical spinal fusion (V45.4) (Z98.1)   · ACDF 2017   · Sensation problem (782.0) (R20.9)   · Shingles (053.9) (B02.9)   · Shoulder pain, bilateral (719.41) (M25.511,M25.512)   · Spasticity (781.0) (R25.2)   · Temporal headache (784.0) (R51.9)   · Vitamin D deficiency (268.9) (E55.9)     Past Medical History  Problems    · No pertinent past medical history     Surgical History  Problems    · History of Lower Back Surgery   · History of Posterior cervical vertebral fusion   · 7/11/22     Family History  Mother    · No pertinent family history  Father    · Family history of hyperlipidemia (V18.19) (Z83.438)   · Family history of hypertension (V17.49) (Z82.49)  Aunt    · Family history of multiple sclerosis (V17.2) (Z82.0)     Social History  Problems    · Born in [place]   · Completed elementary school   · Completed high school   · Education Level: Some college   · Employed   · Has been a  since March of 1989.   ·    · Never a smoker   · Number of children   · Has a 13 and 17 year old sons.   · Occasional alcohol use     Allergies  Medication    · No Known Drug Allergies   Recorded By: James Mcfarland; 12/17/2015 9:39:57 AM        Physical Exam  GENERAL APPEARANCE: No distress, alert, interactive and cooperative.      CARDIOVASCULAR: Regular, rate and rhythm, no murmurs, normal S1 and S2. Pulses +2 and equal in all extremities. No swelling, varicosities, edema, or tenderness to palpation.      MENTAL STATE: Orientation was normal to time, place and person. Recent and remote memory was intact. Attention span and concentration were normal. Language testing was normal for comprehension,  naming, repetition and expression. General fund of knowledge was intact. Able to spell WORLD forwards and backwards.     OPHTHALMOSCOPIC: nl cup:disc ratio bilaterally     CRANIAL NERVES:   CN 2 Visual fields full to confrontation.   CN 3, 4, 6 Pupils round, 4 mm in diameter, equally reactive to light. Lids symmetric; no ptosis. EOMs normal alignment, full range with normal saccades, pursuit and convergence. No nystagmus.   CN 5 Facial sensation intact bilaterally to light touch & pinprick  CN 7 Normal and symmetric facial strength. Nasolabial folds symmetric.   CN 8 Hearing intact to conversation.  CN 9 Palate elevates symmetrically.   CN 11 Normal strength of shoulder shrug.  CN 12 Tongue midline, with normal bulk and strength; no fasciculations.      MOTOR: Muscle bulk was normal and tone was normal in both upper and lower extremities. No adventitious movements.      R L  Delt 5 5  Bicep 4+ 5  Tricep 4- 5   Wrist Flex 4 5   Wrist Ext 4+ 5  ADM 4 5     Hip Flex 4+ 5  Hip Add 5 5  Leg Ext 4 5  Leg Flex 4 5  DF 4- 5  PF 4 5      REFLEXES:   RIGHT UE LEFT UE   BR:2   BR:2   Biceps:2 Biceps:2   Triceps:2 Triceps:2      RIGHT LLE LEFT LLE   Knee:3+            Knee:3+   Ankle:3 Ankle:3      Bilateral Hood with R>L. No clonus, frontal release signs or other pathologic reflexes present.      SENSORY: Sensory exam was normal. In both upper and lower extremities, sensation was intact to light touch; sharp/dull.     COORDINATION: Coordination exam was normal. In both upper extremities, finger-nose-finger was intact without dysmetria or overshoot. In both lower extremities, heel-to-shin was intact. MITCH intact bilaterally. Fine finger movement intact.     GAIT: Gait was abnormal. Station was stable with a normal base. Gait was stable with a decreased rigth arm swing and speed. No ataxia, shuffling or waddling was present. R circumduction w/ slight steppage was present. Tandem gait was intact. No Romberg sign was present.      Multiple sclerosis Functional Composite (MSFC):  25 foot walk: 4.3 s compared to 4.4 s compared to 3.6 s compared to 3.5 s compared to 3.5 s 3.9 s 4.3 s compared to 3.6 s compared to 4.6 s compared to 3.2 s in the last visit.      9-PEG-WHOLE:      Right 37.8 s  Left    18.1 s                                            Estimated EDSS: 2 up from 1 initially   Constitutional: General appearance: no acute distress   Mental status: The patient was in no distress, alert, interactive and cooperative. Affect is appropriate.   Orientation: oriented to person,~oriented to place~and~oriented to time.   Memory: recent memory intact~and~remote memory intact.   Attention: normal attention span~and~normal concentrating ability.   Language: normal comprehension~and~no difficulty naming common objects.   Fund of knowledge: Patient displays adequate knowledge of current events,~adequate fund of knowledge regarding past history~and~adequate fund of knowledge regarding vocabulary.   Cranial nerve II: Visual fields full to confrontation.~no red desaturation, no RAPD.   Cranial nerves III, IV, and VI: Pupils round, equally reactive to light; no ptosis. EOMs intact. No nystagmus.~no red desaturation.   Cranial Nerve V: Facial sensation intact bilaterally.   Cranial nerve VII: Normal and symmetric facial strength.   Cranial nerve VIII: Hearing is intact bilaterally to finger rub / whisper.   Cranial nerves IX and X: Palate elevates symmetrically.   Cranial nerve XI: Shoulder shrug and neck rotation strength are intact.   Cranial nerve XII: Tongue midline with normal strength.   Motor: Muscle bulk was normal in both upper and lower extremities.~Abnormal.~grade 1+ spasticity in the RLE with 3 to 4 beats of unsustained knee and ankle clonus. RUE tone may be sightly increased as well.~Abnormal.~RLE 4+/5 with partial right foot drop.~no abnormal or adventitious movements were present.   Deep Tendon Reflexes: left biceps 3+,~right biceps  3+,~left triceps 2+,~right triceps 2+,~left brachioradialis 4+,~right brachioradialis 4+,~left patella 4+,~right patella 3+,~left ankle jerk 4+,~right ankle jerk 3+   Plantar Reflex: Toes downgoing to plantar stimulation on the left.~Toes upgoing to plantar stimulation on the right.   Coordination: There is no limb dystaxia and rapid alternating movements are intact.   Gait:. right circumduction gait.      Scores and Scales     Pyramidal Functions: (2) Minimal disability.   Cerebellar Functions: (0) Normal.   Brainstem Functions: (0) Normal.      Sensory Function: (0) Normal.                     Bowel and Bladder Function: (0) Normal.      Visual Function: (0) Normal.         Cerebral (or Mental) Functions: (0) Normal.   EDSS - Expanded Disability Status: 2.0 - Minimal disability in one FS    Provider Impressions  #PPMS  #BPPV     Assessment:  This is a 51 year old White right handed male with PMH of lumbar disc disease and a family hx of MS who presented initially in August 2017 with right arm pain and subjective weakness. The neurological exam was positive for generalized pathological hyperreflexia. I have personally reviewed the brain MRIs from 6/2017 which showed a few non-enhancing T2 hyperintense lesions mainly in the deep white matter without any true periventricular, juxtacortical, or infratentorial lesions. I personally reviewed his cervical MRI which showed C5-6 disc herniation with cord compression and myelomalecia. In addition, it showed a short non-enhancing demyelinating-like lesion at the cervicomedullary junction. CSF was normal including negative OCB and IGG index. VEP was normal. MS mimics ruled out including NMO. He underwent a successful cervical decompression surgery in 10/2017 with subsequent improvement in RUE symptoms. He was initially thought to have an incidental asymptomatic small area of demyelination in the upper cervical cord without evidence of dissemination in space or time and no  markers of recurrence or disease progression. He was therefore deemed appropriate for monitoring off DMT.     05/14/2018: started having progressive RLE weakness, heaviness, and partial foot drop. Exam revealed new RLE weakness and mild spasticity.   05/15/2018 I personally reviewed his new MRI and compared them to the MRIs from 2017. MRI brain did not show active or new lesions compared to 2017. MRI cervical showed improved cord compression at the site of surgery and stable upper CMJ lesion (to my eyes looks somewhat bigger). thoracic MRI showed a new enhancing lesion at T4 on the right side. This confirms PPMS with activity. Patient is indicated for steroid therapy and Ocrevus.      11/20/2018: new transient left facial numbness and twitching but MRI negative for any new or active lesions in the brain or spinal cord. Likely pseudorelapse from a previous unnoticed relapse. tolerating acrevus well. baclofen helpful.      06/12/2019: MRI stable, worsening fatigue and spasms. Will start amantadine and increase baclofen.      12/03/2019: Some worsening of pre-existing symptoms in the RUE and RLE but exam stable or even improved compared to before. cervical XRAY showed some osseous bridging at the surgical site and Dr. Willis is considering a revision. The new symptoms could be expected fluctuation of MS symptoms or recurrent cord compression at the previous surgical site.      06/09/2020: He feels that his right leg is getting weaker but his exam and 25-f-w are actually stable to improved compared to prior exams. He works as a  and is frequently exposed to COVID-19 patients but with full PPE. His brain and cervical MRIs from May did not show any changes compared to the prior MRI from last year. Will get Ig and CD19 levels. I advised against exposure to COVID-19 patients while on ocrevus.      12/09/2020: He feels that his right leg is still getting weaker but exam is stable to improved.. he tried a  bioness but felt that he's not ready for it yet and the therapists agreed as well. His labs from June showed only slight lymphopenia and minor reduction in Igg. he saw Dr. Willis who had no further recs regarding his cervical myelopathy. Ocrevus is going well. Baclofen is helpful at the higher dose.     06/03/2021: He feels that his right arm and leg are still getting weaker and number. His right arm is also painful. It is hard for him now to use the right hand for cooking, playing basketball, and even driving. Exam remaisn stable with excellent walking speed. MRI brain and cervical stable. his latest labs still showed mild reduction of IgG and ALC. He took his first COVID vaccine in December and the second in January. He had received his ocrevus dose shortly before his first vaccine dose in December. will increase baclofen and gabapentin. will check labs including spike protein.      01/17/2022: continues with RUE pain and numbness and is getting a second opinion with another spine specialist. No change in his known RLE weakness and gait fatigue. He had sever left flank pain after the latest ocrevus infusion and went to the ER. No kidney stone was found. He became constipated later after he was given oxycodone for pain, now improving. He received COVID booster in October but still tested negative for the COVID19 spike antibody. Labs from [facility] still show mild reduction in ALC and IgG.      08/22/2022: worsening sensorimotor deficits over right C6/7 distribution prompted posterior decompression for cervical myelopathy. Post-op improvement in RUE symptoms. Recently hit head -> post-traumatic BPPV. Seen in [facility] in ED, improving recently. No new MS symptoms and walking speed stable. MRI stable without new or active lesions. labs acceptable with only slight reduction in ALC and IGG. He pushed his next ocrevus to January for deductible purposes, which is fine as he never had early B cell repletion.      06/26/2023: He  did not pass the latest physical for work and had to retire. been frustrated about that. right leg is getting stiffer and right arm getting harder to use despite the transient improvement last year after cervical repeat surgery. no infections. blood work with stable mild reduction in IgG and ALC. MRI done at an OSH due to insurance issues and the CD he brought in was empty. 25-f-w is stable but gait is more spsatic. will increase baclofen to 30 mg BID.     1/18/2024: After Dr. Willis left , he went to [facility] for evaluation of progressive RUE weakness and new dystonic flexion of the right fingers. He was seen by neurosurgery and had repeat spine MRIs in November that were stable. He hand an EMG done that showed no evidence of ulnar neuropathy or motor neuron disease. He saw Dr. Hopper at the [facility] who agreed with ocrevus treatment and referred him to the spasticity clinic where he was deemed unsuitable for botox for spasticity. He was treated empirically with high dose IV steroids followed by prednisone taper at the [facility] with subsequent improvement in foot lift and gait but without effect on his RUE. He then had an URTI that required antibiotic therapy. He had an xray that was negative for PNA but showed some abdominal hyperdensity that he is undergoing CT abdomen for soon. He is noticing a change in his trunk/abdomen shape and a leaning towards the right when walking. His last ocrevus labs from last week showed a decrease in IgG level but normal ALC. On exam he does have new grade 1-2 spasticity in the right fingers with mild dynamic dystonic flexion of the fingers. I think he can be a good candidate for a low dose botox to the finger flexors (25 FDS and 25 FDP) with the caveat of potentially causing finger weakness. He wants to think about it and let me know. His RUE weakness and spasticity are clearly directly related to his double cervical pathology including demyelination and compressive myelopathy. I  don't think it is necessary to look for additional causes. He has PPMS and his disease is expected to progress despite ocrevus. He may need IVIG replacement in the future if he develops serious infections. Although his favorable response to pulse steroid therapy is encouraging, we will have to be conservative with steroids given his increased risk of infection in the setting of ocrevus and iatrogenic hypogammaglobulinemia.     7/16/2024: Continues to struggle with worsening spasticity of the right hemibody including leaning to one side with truncal indentation on the left. No GI cause was found. His right hand continues to get weaker and his right arm and fingers continue to be semiflexed while walking. Had another EMG at [facility] that was reportedly normal. Still not decided on botox but is scheduled with me in August. Still experiencing neuropathic pain in the RUE. MRI stable. IGG still low but ALC normal. No infections. He is changing insurance in November and is worried about continuation of coverage    Plan:     - Acute relapse management: Not indicated. He had a good response to empiric steroids in 2023 so may consider this in the future if his symptoms get worse but we have to be conservative with this approach given his low IGG levels.       - Continue Ocrevus. Will make sure we maintain coverage after insurance change in November.      - Labs: will get labs before the next infusion.      - Will order MRI of the brain WWO to be done in July of 2025.      - Symptomatic management: continue same doses of baclofen to 40 mg BID. Increase tizanidine to 4 mg at night. increase gabapeitn to 300 mg QID. He will let me know his final decision regarding botox.       - Vitamin D: continue 5000 units daily.      - Smoking: non smoker      - PT/OT: continue     - Instructions: keep an active life style and develop exercise routine as tolerated. Recommend a balanced healthy diet. Avoid excessive amounts of salt, carbs, fat,  and red meat. Increase intake of fruits, vegetables, and white meat.      - Follow up: after six months or in August for botox.      - The impression and plan were discussed with the patient and their family (if present) in detail and all questions answered. They agreed to the plan as outlined above.         I spent 40 minutes with this patient. Greater than 50% of this time was spent in counseling and/or coordination of care.     Vern Patel MD, PhD   of Neurology  [facility] [employer]  Director of Neuroimmunology and staff neurologist at the [facility]  [facility]

## 2024-07-16 NOTE — PATIENT INSTRUCTIONS
Your MRI did not show any new or active lesions, which is great.     Your blood work from July showed acceptable immune cell counts but your immunoglobulin G level is still low. Please let me know if you have any serious infections.      I will order MRI of the brain to be done in July of 2025.     Please continue PT/OT     please continue the same dose of vitamin D.      Please continue the same dose of baclofen. Please increase tizanidine to 4 mg.     Please increase gabapentin to 300 mg four times daily.     Please think about botox. It will help the posture of the right arm and fingers but it cannot make you stronger.     I will have the pharmacist look into medication coverage after November.      Follow up after six months.      Thank you very much for seeing me today.     Vern Patel MD, PhD   of Neurology  [facility] [employer]  Director of Neuroimmunology and staff neurologist at the [facility]  [facility]

## 2024-07-18 ENCOUNTER — APPOINTMENT (OUTPATIENT)
Dept: PHYSICAL THERAPY | Facility: CLINIC | Age: 59
End: 2024-07-18
Payer: COMMERCIAL

## 2024-07-18 DIAGNOSIS — R26.9 ABNORMALITY OF GAIT: Primary | ICD-10-CM

## 2024-07-18 DIAGNOSIS — R26.89 BALANCE PROBLEM: ICD-10-CM

## 2024-07-18 DIAGNOSIS — M54.2 NECK PAIN: ICD-10-CM

## 2024-07-18 DIAGNOSIS — R29.898 DECREASED ROM OF NECK: ICD-10-CM

## 2024-07-18 PROCEDURE — 97110 THERAPEUTIC EXERCISES: CPT | Mod: GP

## 2024-07-18 NOTE — PROGRESS NOTES
"Physical Therapy Treatment      Patient Name: Marshall Padilla  MRN: [id number]  Today's Date: 7/18/2024  Visit #: 10  Insurance: 20vs/yr   Time Calculation  Start Time: 1228  Stop Time: 1301  Time Calculation (min): 33 min    1. Abnormality of gait        2. Balance problem        3. Decreased ROM of neck        4. Neck pain                            ASSESSMENT:  It is promising that patient is noticing an improvement in discomfort and function of R hip and L knee over the past 3 weeks.  Still present, however.  Updates made at this time.  No adverse events.    GOALS:  Improvement in gait mechanics without accessory / compensatory swing strategies for advancement of R LE.     Reciprocal stair navigation without vaulting of L LE     No falls during POC.     Improvements in strength of R hip, knee and ankle flexion by half a grade.     Subjective reports of improvements in posture.     Other goals to be updated throughout POC.    PLAN:  Continue with PT POC    SUBJECTIVE:    Been very busy.  On a steroid right now.  Met with Dr. Patel and residents.  Getting Botox 8/8 in R arm and leg.  Met with c-spine doctor and was told that it's status quo.  Increased Baclofen to now doing 4x/day.  Increased Tizanidine to 4 at night.  Has a 24 degree shift for scoliosis and will see a scoliosis specialist on 8/21.  Hip and knee both still bothersome but \"nowhere near as bad.\"  MRI of brain showed nothing changed.  Had gastric emptying test and can still feel discomfort on L side.      OBJECTIVE:      TREATMENT:    Therapeutic Exercise (50603): 33 minutes     Updates made to HEP:  Standing Banded Squats (around L knee) -- 3 sets x 10  Banded R Ankle Mobilization -- 10 reps x 10\"  ^^ discussed the benefits and importance of this    HEP:  Clamshells -- 2 sets x 15  Bridges -- 2 sets x 10-15 with few second holds  Supine R LE Hip Flexion -- 2 sets x 15  Supine R LE March -- 2 sets x 15  Seated L Hip Abduction -- black band, 3 sets x " 15  ^^ discussed pace, performance, and purpose of each.  Attempted other exercises, these chosen as most-optimal.

## 2024-07-25 ENCOUNTER — APPOINTMENT (OUTPATIENT)
Dept: PHYSICAL THERAPY | Facility: CLINIC | Age: 59
End: 2024-07-25
Payer: COMMERCIAL

## 2024-07-25 DIAGNOSIS — R26.9 ABNORMALITY OF GAIT: Primary | ICD-10-CM

## 2024-07-25 DIAGNOSIS — M54.2 NECK PAIN: ICD-10-CM

## 2024-07-25 DIAGNOSIS — R29.898 DECREASED ROM OF NECK: ICD-10-CM

## 2024-07-25 DIAGNOSIS — R26.89 BALANCE PROBLEM: ICD-10-CM

## 2024-07-25 PROCEDURE — 97110 THERAPEUTIC EXERCISES: CPT | Mod: GP

## 2024-07-25 NOTE — PROGRESS NOTES
"Physical Therapy Treatment      Patient Name: Marshall Padilla  MRN: [id number]  Today's Date: 7/25/2024  Visit #: 11  Insurance: 20vs/yr   Time Calculation  Start Time: 1035  Stop Time: 1115  Time Calculation (min): 40 min    1. Abnormality of gait        2. Balance problem        3. Decreased ROM of neck        4. Neck pain                              ASSESSMENT:  Challenged by weakness that abounds in R hamstring and the spasticity in R quad.  Updated HEP to address all this.  No adverse events.  Receptive to all discussion and recommendations.    GOALS:  Improvement in gait mechanics without accessory / compensatory swing strategies for advancement of R LE.     Reciprocal stair navigation without vaulting of L LE     No falls during POC.     Improvements in strength of R hip, knee and ankle flexion by half a grade.     Subjective reports of improvements in posture.     Other goals to be updated throughout POC.    PLAN:  Continue with PT POC    SUBJECTIVE:    Wedding went well and \"whirlwind is over.\"  Last steroid is done.  Stretches have been great for R ankle, but hard to notice a benefit to it.  Wants to focus on R hamstring.  Still noticing a little R hip catching / snapping, but much less.  Hasn't been doing as much biking.    OBJECTIVE:    TREATMENT:    Therapeutic Exercise (70930): 40 minutes     Cybex Leg Curls -- 3 sets x 10, 35lbs; 2 set x fatigue, no weight, then 5lbs R LE only -- performing to encourage recruitment of R HS.     Seated R Hip Flexion -- cues for proper performance    Updates made to HEP:  Encouraged to get back into cycling.  Laying HS Curl with Ball or Furniture Slider.  Marches in Sitting.  ^^ discussed this and its importance    HEP:  Standing Banded Squats (around L knee) -- 3 sets x 10  Banded R Ankle Mobilization -- 10 reps x 10\"  Clamshells -- 2 sets x 15  Bridges -- 2 sets x 10-15 with few second holds  Supine R LE Hip Flexion -- 2 sets x 15  Supine R LE March -- 2 sets x 15  Seated " L Hip Abduction -- black band, 3 sets x 15  ^^ discussed pace, performance, and purpose of each.  Attempted other exercises, these chosen as most-optimal.

## 2024-08-07 ENCOUNTER — APPOINTMENT (OUTPATIENT)
Dept: PHYSICAL THERAPY | Facility: CLINIC | Age: 59
End: 2024-08-07
Payer: COMMERCIAL

## 2024-08-07 DIAGNOSIS — R26.9 ABNORMALITY OF GAIT: ICD-10-CM

## 2024-08-07 DIAGNOSIS — R26.89 BALANCE PROBLEM: Primary | ICD-10-CM

## 2024-08-07 DIAGNOSIS — R29.898 DECREASED ROM OF NECK: ICD-10-CM

## 2024-08-07 DIAGNOSIS — M54.2 NECK PAIN: ICD-10-CM

## 2024-08-07 PROCEDURE — 97110 THERAPEUTIC EXERCISES: CPT | Mod: GP

## 2024-08-07 NOTE — PROGRESS NOTES
"Physical Therapy Treatment      Patient Name: Marshall Padilla  MRN: [id number]  Today's Date: 8/7/2024  Visit #: 12  Insurance: 20vs/yr   Time Calculation  Start Time: 1016  Stop Time: 1100  Time Calculation (min): 44 min    1. Balance problem        2. Abnormality of gait        3. Decreased ROM of neck        4. Neck pain                                ASSESSMENT:  Good participation throughout.  Promising that unable to reproduce the clicking / popping in R hip.  L knee discomfort continues to be bothersome, especially to the touch.   As such, updated HEP to address all this.  No adverse events.  Receptive to all discussion and recommendations.    GOALS:  Improvement in gait mechanics without accessory / compensatory swing strategies for advancement of R LE.     Reciprocal stair navigation without vaulting of L LE     No falls during POC.     Improvements in strength of R hip, knee and ankle flexion by half a grade.     Subjective reports of improvements in posture.     Other goals to be updated throughout POC.    PLAN:  Continue with PT POC    SUBJECTIVE:    Been doing bike riding for 15-20 minutes.  Bothers knee a little bit.  Has a new brace when cutting the grass.  Signed up for 5k in February at [place].  Started walking on the treadmill.  Got back into doing some of the hamstring exercises.  Still some clicking in R hip.  Thinks is doing \"really well\" overall.  Thinks stairs are getting better where felt like wasn't catching R toe.    OBJECTIVE:    TREATMENT:    Therapeutic Exercise ([id number]): 44 minutes     Cybex Leg Curls -- x 10 with 30lbs; 3 sets x 10, 35lbs; 2 set x fatigue, no weight, then 5lbs R LE only -- performing to encourage recruitment of R HS.     Cybex Chest Press -- 3 sets with 50lbs, performing with cues for proper positioning and mechanics.    Updates made to HEP:  Standing Banded Squats  L LE Tap Down Hinge and Squat on 2\" Riser  Leg Extensions  ^ 2-3 days per week.  Educated on pace, performance " "and purpose to work on addressing patient's L LE discomfort.    HEP:  Standing Banded Squats (around L knee) -- 3 sets x 10  Banded R Ankle Mobilization -- 10 reps x 10\"  Clamshells -- 2 sets x 15  Bridges -- 2 sets x 10-15 with few second holds  Supine R LE Hip Flexion -- 2 sets x 15  Supine R LE March -- 2 sets x 15  Seated L Hip Abduction -- black band, 3 sets x 15 discontinued  ^^ discussed pace, performance, and purpose of each.  Attempted other exercises, these chosen as most-optimal.    "

## 2024-08-08 ENCOUNTER — APPOINTMENT (OUTPATIENT)
Dept: NEUROLOGY | Facility: CLINIC | Age: 59
End: 2024-08-08
Payer: COMMERCIAL

## 2024-08-15 ENCOUNTER — APPOINTMENT (OUTPATIENT)
Dept: PHYSICAL THERAPY | Facility: CLINIC | Age: 59
End: 2024-08-15
Payer: COMMERCIAL

## 2024-08-15 DIAGNOSIS — R26.9 ABNORMALITY OF GAIT: Primary | ICD-10-CM

## 2024-08-15 DIAGNOSIS — R26.89 BALANCE PROBLEM: ICD-10-CM

## 2024-08-15 DIAGNOSIS — M54.2 NECK PAIN: ICD-10-CM

## 2024-08-15 DIAGNOSIS — R29.898 DECREASED ROM OF NECK: ICD-10-CM

## 2024-08-15 PROCEDURE — 97110 THERAPEUTIC EXERCISES: CPT | Mod: GP

## 2024-08-15 NOTE — PROGRESS NOTES
"Physical Therapy Treatment      Patient Name: Marshall Padilla  MRN: [id number]  Today's Date: 8/15/2024  Visit #: 13  Insurance: 20vs/yr   Time Calculation  Start Time: 1533  Stop Time: 1613  Time Calculation (min): 40 min    1. Abnormality of gait        2. Balance problem        3. Decreased ROM of neck        4. Neck pain                ASSESSMENT:  Good participation throughout.  Receptive to all discussion this date and exercises performed.   No adverse events.  Receptive to all discussion and recommendations.    GOALS:  Improvement in gait mechanics without accessory / compensatory swing strategies for advancement of R LE.     Reciprocal stair navigation without vaulting of L LE     No falls during POC.     Improvements in strength of R hip, knee and ankle flexion by half a grade.     Subjective reports of improvements in posture.     Other goals to be updated throughout POC.    PLAN:  Continue with PT POC    SUBJECTIVE:    Been emphasizing walking and everything going well.  Went golfing yesterday and it was a learning experience.  L knee is a little bothersome.  Been reading more about it.  Wants to do hamstring work and chest press.    Been doing bike riding for 15-20 minutes.  Bothers knee a little bit.  Has a new brace when cutting the grass.  Signed up for 5k in February at [place].  Started walking on the treadmill.  Got back into doing some of the hamstring exercises.  Still some clicking in R hip.  Thinks is doing \"really well\" overall.  Thinks stairs are getting better where felt like wasn't catching R toe.    OBJECTIVE:    TREATMENT:    Therapeutic Exercise ([id number]): 44 minutes    Bar / Dowel Chest Press -- 4lbs; 12lbs -- cues for wrist position, , and \"breaking the bar\"     Cybex Leg Curls -- 3 sets x 10, 35lbs; x 8, 40lbs; 1 set x fatigue 5lbs R LE only -- performing to encourage recruitment of R HS to carryover into other activities.    Cybex Chest Press -- 3 sets with 50lbs, 2 sets x 10, 1 set x 9, " "performing with cues for proper positioning and mechanics.    Sled Drag -- 60ft distances, 35lbs -- performing to engage R hip extensors and hamstrings.    HEP:    Standing Banded Squats  L LE Tap Down Hinge and Squat on 2\" Riser  Leg Extensions  ^ 2-3 days per week.  Educated on pace, performance and purpose to work on addressing patient's L LE discomfort.    "

## 2024-08-21 ENCOUNTER — APPOINTMENT (OUTPATIENT)
Dept: PHYSICAL THERAPY | Facility: CLINIC | Age: 59
End: 2024-08-21
Payer: COMMERCIAL

## 2024-08-21 DIAGNOSIS — R29.898 DECREASED ROM OF NECK: ICD-10-CM

## 2024-08-21 DIAGNOSIS — R26.89 BALANCE PROBLEM: ICD-10-CM

## 2024-08-21 DIAGNOSIS — M54.2 NECK PAIN: ICD-10-CM

## 2024-08-21 DIAGNOSIS — R26.9 ABNORMALITY OF GAIT: Primary | ICD-10-CM

## 2024-08-21 PROCEDURE — 97110 THERAPEUTIC EXERCISES: CPT | Mod: GP

## 2024-08-21 NOTE — PROGRESS NOTES
"Physical Therapy Treatment      Patient Name: Marshall Padilla  MRN: [id number]  Today's Date: 8/21/2024  Visit #: 14  Insurance: 20vs/yr   Time Calculation  Start Time: 1615  Stop Time: 1658  Time Calculation (min): 43 min    1. Abnormality of gait        2. Balance problem        3. Decreased ROM of neck        4. Neck pain                  ASSESSMENT:  Good participation throughout.  Receptive to all discussion this date and exercises performed.   No adverse events.     GOALS:  Improvement in gait mechanics without accessory / compensatory swing strategies for advancement of R LE.     Reciprocal stair navigation without vaulting of L LE     No falls during POC.     Improvements in strength of R hip, knee and ankle flexion by half a grade.     Subjective reports of improvements in posture.     Other goals to be updated throughout POC.    PLAN:  Continue with PT POC    SUBJECTIVE:    Sore from doing a lot of lawn work and doing upper body activities.  Hips and thighs and low back.  Met with spine doctor this morning.  Not concerned about scoliosis.  Been working on upper body and hamstring activities.        OBJECTIVE:    TREATMENT:    Therapeutic Exercise ([id number]): 43 minutes    Cybex Leg Curls -- 40lbs, 3 sets x 10; 1 set x fatigue 10lbs R LE only -- performing to encourage recruitment of R HS to carryover into other activities.    R UE Bicep Stretch -- holding bar, turning away from R -- after multiple attempts, symptoms lessening    L Leg Extensions -- 2 sets x 20 with 30lbs -- encouraged to perform at home to work on building quad strength and take off pressure from joint of knee.  Encouraged 2-3x/wk.    HEP:    Standing Banded Squats  L LE Tap Down Hinge and Squat on 2\" Riser  Leg Extensions  ^ 2-3 days per week.  Educated on pace, performance and purpose to work on addressing patient's L LE discomfort.    "

## 2024-08-29 ENCOUNTER — APPOINTMENT (OUTPATIENT)
Dept: NEUROLOGY | Facility: CLINIC | Age: 59
End: 2024-08-29
Payer: COMMERCIAL

## 2024-08-29 VITALS — BODY MASS INDEX: 25.75 KG/M2 | WEIGHT: 206 LBS

## 2024-08-29 DIAGNOSIS — M62.838 MUSCLE SPASM: ICD-10-CM

## 2024-08-29 DIAGNOSIS — G35 MULTIPLE SCLEROSIS (MULTI): Primary | ICD-10-CM

## 2024-08-29 NOTE — PROGRESS NOTES
Chief Complaint  PPMS.      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 known: PPMS with activity  Date of onset: Incidentally found 6/2017, clinical onset 3/2018  Date of diagnosis: 10/19/2017 radiological diagnosis, 05/15/2018 clinical diagnosis  disease course at onset: progressive relapsing   disease course now: progressive relapsing   Current DMT: Ocrevus since June 2018  Previous DMT: none   Last MRI brain: 7/2024  Last MRI cervical: 11/2023  Last MRI thoracic: 11/2023  Last VEP: negative in 8/2017  CSF: done 9/2017 normal CSF, negative OCBs and IGG index  JCV: positive 0.6 9/2017  VZV: positive 9/2017  HEP panel: negative 9/2017  NMO: negative 9/2017     This is a 51 year old right handed White male patient who presented initially with right arm pain in the setting of significant C5-6 disc prolapse with radiculomyelopathy and an incidental finding of an unrelated demyelinating -like plaque at the CMJ. brain MRI showed only non specific deep white matter hyperintense lesions. He underwent a successful cervical decompression surgery with Dr. Willis in October 2017 and his RUE improved. LP was negative for MS markers but he started having progressive RLE weakness in March 2018. Repeat imaging showed a new enhancing lesion at T4 on the right side. A diagnosis of PPMS with activity and progression was made.      INTERVAL HISTORY:  Labs from July acceptable. Here for his first botox injection session. Tolerated well.      MS symptom review:     weakness: yes RLE and RUE   sensory changes: yes presenting symptom, RUE  incoordination: no  falling: no  gait change: yes, limp  painful vision loss: no  double vision: no  vertigo: no  facial/bulbar weakness: no but had facial numbness and twitching, resolved  Lhermitte's: no  bladder/bowel dysfunction: urinary frequency      spasticity: yes, right leg now feels heavier and circumducts while  walking, improved after baclofen.   tonic spasms: yes RUE isometric painful triggered by lying down at night. almost daily.   tremors: no  RLS: no  dystonia: no  other movement disorders: no     heat sensitivity: yes especially during work ()   fatigue: yes  depression: no  anxiety: yes major   cognitive changes: nothing major     DMT: Ocrevus as of 6/2018  Side effects: No     Vitamin D: 5000 units daily  symptomatic: baclofen 40 mg, helpful. Gabapentin. Amantadine is prescribed but he never took it. Botox started on 8/29/2024              Review of Systems  All systems reviewed and are negative except as mentioned in the HPI.        Active Problems  Problems    · Abnormal MRI, cervical spine (793.7) (R93.7)   · Abnormality of gait (781.2) (R26.9)   · Arm pain (729.5) (M79.603)   · right   · Balance problem (781.99) (R26.89)   · Cervical disc disease with myelopathy (722.71) (M50.00)   · Cervical myelopathy (721.1) (G95.9)   · Cervical radiculopathy (723.4) (M54.12)   · Chronic sinusitis (473.9) (J32.9)   · Decreased coordination (781.3) (R27.8)   · Decreased ROM of neck (723.8) (R29.898)   · Decreased strength, endurance, and mobility (780.79,780.99) (R53.1,R68.89,Z74.09)   · Depression with anxiety (300.4) (F41.8)   · Dizziness (780.4) (R42)   · Encounter for fitness for duty examination (V70.5) (Z02.89)   · Grippy cold (487.1) (J11.1)   · Immunosuppression (279.9) (D84.9)   · Multiple sclerosis, primary progressive (340) (G35)   · Myelitis (323.9) (G04.91)   · Neck pain (723.1) (M54.2)   · Neck stiffness (723.5) (M43.6)   · Numbness of arm (782.0) (R20.0)   · Otitis externa (380.10) (H60.90)   · Pain of right upper extremity (729.5) (M79.601)   · Paresthesia of arm (782.0) (R20.2)   · Paresthesias/numbness (782.0) (R20.9)   · Pins and needles sensation (782.0) (R20.2)   · right arm   · Right arm weakness (729.89) (R29.898)   · Right-sided muscle weakness (728.87) (M62.81)   · S/P cervical spinal  fusion (V45.4) (Z98.1)   · ACDF 2017   · Sensation problem (782.0) (R20.9)   · Shingles (053.9) (B02.9)   · Shoulder pain, bilateral (719.41) (M25.511,M25.512)   · Spasticity (781.0) (R25.2)   · Temporal headache (784.0) (R51.9)   · Vitamin D deficiency (268.9) (E55.9)     Past Medical History  Problems    · No pertinent past medical history     Surgical History  Problems    · History of Lower Back Surgery   · History of Posterior cervical vertebral fusion   · 7/11/22     Family History  Mother    · No pertinent family history  Father    · Family history of hyperlipidemia (V18.19) (Z83.438)   · Family history of hypertension (V17.49) (Z82.49)  Aunt    · Family history of multiple sclerosis (V17.2) (Z82.0)     Social History  Problems    · Born in [place]   · Completed elementary school   · Completed high school   · Education Level: Some college   · Employed   · Has been a  since March of 1989.   ·    · Never a smoker   · Number of children   · Has a 13 and 17 year old sons.   · Occasional alcohol use     Allergies  Medication    · No Known Drug Allergies   Recorded By: James Mcfarland; 12/17/2015 9:39:57 AM        Physical Exam  GENERAL APPEARANCE: No distress, alert, interactive and cooperative.      CARDIOVASCULAR: Regular, rate and rhythm, no murmurs, normal S1 and S2. Pulses +2 and equal in all extremities. No swelling, varicosities, edema, or tenderness to palpation.      MENTAL STATE: Orientation was normal to time, place and person. Recent and remote memory was intact. Attention span and concentration were normal. Language testing was normal for comprehension, naming, repetition and expression. General fund of knowledge was intact. Able to spell WORLD forwards and backwards.     OPHTHALMOSCOPIC: nl cup:disc ratio bilaterally     CRANIAL NERVES:   CN 2 Visual fields full to confrontation.   CN 3, 4, 6 Pupils round, 4 mm in diameter, equally reactive to light. Lids symmetric; no ptosis. EOMs  normal alignment, full range with normal saccades, pursuit and convergence. No nystagmus.   CN 5 Facial sensation intact bilaterally to light touch & pinprick  CN 7 Normal and symmetric facial strength. Nasolabial folds symmetric.   CN 8 Hearing intact to conversation.  CN 9 Palate elevates symmetrically.   CN 11 Normal strength of shoulder shrug.  CN 12 Tongue midline, with normal bulk and strength; no fasciculations.      MOTOR: Muscle bulk was normal and tone was normal in both upper and lower extremities. No adventitious movements.      R L  Delt 5 5  Bicep 4+ 5  Tricep 4- 5   Wrist Flex 4 5   Wrist Ext 4+ 5  ADM 4 5     Hip Flex 4+ 5  Hip Add 5 5  Leg Ext 4 5  Leg Flex 4 5  DF 4- 5  PF 4 5      REFLEXES:   RIGHT UE LEFT UE   BR:2   BR:2   Biceps:2 Biceps:2   Triceps:2 Triceps:2      RIGHT LLE LEFT LLE   Knee:3+            Knee:3+   Ankle:3 Ankle:3      Bilateral Hood with R>L. No clonus, frontal release signs or other pathologic reflexes present.      SENSORY: Sensory exam was normal. In both upper and lower extremities, sensation was intact to light touch; sharp/dull.     COORDINATION: Coordination exam was normal. In both upper extremities, finger-nose-finger was intact without dysmetria or overshoot. In both lower extremities, heel-to-shin was intact. MITCH intact bilaterally. Fine finger movement intact.     GAIT: Gait was abnormal. Station was stable with a normal base. Gait was stable with a decreased rigth arm swing and speed. No ataxia, shuffling or waddling was present. R circumduction w/ slight steppage was present. Tandem gait was intact. No Romberg sign was present.     Multiple sclerosis Functional Composite (MSFC):  25 foot walk: 4.3 s compared to 4.4 s compared to 3.6 s compared to 3.5 s compared to 3.5 s 3.9 s 4.3 s compared to 3.6 s compared to 4.6 s compared to 3.2 s in the last visit.      9-PEG-WHOLE:      Right 37.8 s  Left    18.1 s                                            Estimated  EDSS: 2 up from 1 initially   Constitutional: General appearance: no acute distress   Mental status: The patient was in no distress, alert, interactive and cooperative. Affect is appropriate.   Orientation: oriented to person,~oriented to place~and~oriented to time.   Memory: recent memory intact~and~remote memory intact.   Attention: normal attention span~and~normal concentrating ability.   Language: normal comprehension~and~no difficulty naming common objects.   Fund of knowledge: Patient displays adequate knowledge of current events,~adequate fund of knowledge regarding past history~and~adequate fund of knowledge regarding vocabulary.   Cranial nerve II: Visual fields full to confrontation.~no red desaturation, no RAPD.   Cranial nerves III, IV, and VI: Pupils round, equally reactive to light; no ptosis. EOMs intact. No nystagmus.~no red desaturation.   Cranial Nerve V: Facial sensation intact bilaterally.   Cranial nerve VII: Normal and symmetric facial strength.   Cranial nerve VIII: Hearing is intact bilaterally to finger rub / whisper.   Cranial nerves IX and X: Palate elevates symmetrically.   Cranial nerve XI: Shoulder shrug and neck rotation strength are intact.   Cranial nerve XII: Tongue midline with normal strength.   Motor: Muscle bulk was normal in both upper and lower extremities.~Abnormal.~grade 1+ spasticity in the RLE with 3 to 4 beats of unsustained knee and ankle clonus. RUE tone may be sightly increased as well.~Abnormal.~RLE 4+/5 with partial right foot drop.~no abnormal or adventitious movements were present.   Deep Tendon Reflexes: left biceps 3+,~right biceps 3+,~left triceps 2+,~right triceps 2+,~left brachioradialis 4+,~right brachioradialis 4+,~left patella 4+,~right patella 3+,~left ankle jerk 4+,~right ankle jerk 3+   Plantar Reflex: Toes downgoing to plantar stimulation on the left.~Toes upgoing to plantar stimulation on the right.   Coordination: There is no limb dystaxia and rapid  alternating movements are intact.   Gait:. right circumduction gait.      Scores and Scales     Pyramidal Functions: (2) Minimal disability.   Cerebellar Functions: (0) Normal.   Brainstem Functions: (0) Normal.      Sensory Function: (0) Normal.       Bowel and Bladder Function: (0) Normal.      Visual Function: (0) Normal.         Cerebral (or Mental) Functions: (0) Normal.   EDSS - Expanded Disability Status: 2.0 - Minimal disability in one FS    Procedure:  Neurology Botulinum Injection    Verbal signout was taken. Mechanism of action, efficacy, side effects, risks, and alternatives associated with the medication were discussed and information material was provided. All questions answered.      Patient was placed in supine position. Muscle was prepped with alcohol. EMG-guided botox injections were injected to the following muscle on the right side: biceps 25 units, FDS 25 units, and FDP 25 units. He tolerated the procedure well.        Provider Impressions       Assessment:  This is a 51 year old White right handed male with PMH of lumbar disc disease and a family hx of MS who presented initially in August 2017 with right arm pain and subjective weakness. The neurological exam was positive for generalized pathological hyperreflexia. I have personally reviewed the brain MRIs from 6/2017 which showed a few non-enhancing T2 hyperintense lesions mainly in the deep white matter without any true periventricular, juxtacortical, or infratentorial lesions. I personally reviewed his cervical MRI which showed C5-6 disc herniation with cord compression and myelomalecia. In addition, it showed a short non-enhancing demyelinating-like lesion at the cervicomedullary junction. CSF was normal including negative OCB and IGG index. VEP was normal. MS mimics ruled out including NMO. He underwent a successful cervical decompression surgery in 10/2017 with subsequent improvement in RUE symptoms. He was initially thought to have an  incidental asymptomatic small area of demyelination in the upper cervical cord without evidence of dissemination in space or time and no markers of recurrence or disease progression. He was therefore deemed appropriate for monitoring off DMT.     05/14/2018: started having progressive RLE weakness, heaviness, and partial foot drop. Exam revealed new RLE weakness and mild spasticity.   05/15/2018 I personally reviewed his new MRI and compared them to the MRIs from 2017. MRI brain did not show active or new lesions compared to 2017. MRI cervical showed improved cord compression at the site of surgery and stable upper CMJ lesion (to my eyes looks somewhat bigger). thoracic MRI showed a new enhancing lesion at T4 on the right side. This confirms PPMS with activity. Patient is indicated for steroid therapy and Ocrevus.      11/20/2018: new transient left facial numbness and twitching but MRI negative for any new or active lesions in the brain or spinal cord. Likely pseudorelapse from a previous unnoticed relapse. tolerating acrevus well. baclofen helpful.      06/12/2019: MRI stable, worsening fatigue and spasms. Will start amantadine and increase baclofen.      12/03/2019: Some worsening of pre-existing symptoms in the RUE and RLE but exam stable or even improved compared to before. cervical XRAY showed some osseous bridging at the surgical site and Dr. Willis is considering a revision. The new symptoms could be expected fluctuation of MS symptoms or recurrent cord compression at the previous surgical site.      06/09/2020: He feels that his right leg is getting weaker but his exam and 25-f-w are actually stable to improved compared to prior exams. He works as a  and is frequently exposed to COVID-19 patients but with full PPE. His brain and cervical MRIs from May did not show any changes compared to the prior MRI from last year. Will get Ig and CD19 levels. I advised against exposure to COVID-19 patients  while on ocrevus.      12/09/2020: He feels that his right leg is still getting weaker but exam is stable to improved.. he tried a bioness but felt that he's not ready for it yet and the therapists agreed as well. His labs from June showed only slight lymphopenia and minor reduction in Igg. he saw Dr. Willis who had no further recs regarding his cervical myelopathy. Ocrevus is going well. Baclofen is helpful at the higher dose.     06/03/2021: He feels that his right arm and leg are still getting weaker and number. His right arm is also painful. It is hard for him now to use the right hand for cooking, playing basketball, and even driving. Exam remaisn stable with excellent walking speed. MRI brain and cervical stable. his latest labs still showed mild reduction of IgG and ALC. He took his first COVID vaccine in December and the second in January. He had received his ocrevus dose shortly before his first vaccine dose in December. will increase baclofen and gabapentin. will check labs including spike protein.      01/17/2022: continues with RUE pain and numbness and is getting a second opinion with another spine specialist. No change in his known RLE weakness and gait fatigue. He had sever left flank pain after the latest ocrevus infusion and went to the ER. No kidney stone was found. He became constipated later after he was given oxycodone for pain, now improving. He received COVID booster in October but still tested negative for the COVID19 spike antibody. Labs from [facility] still show mild reduction in ALC and IgG.      08/22/2022: worsening sensorimotor deficits over right C6/7 distribution prompted posterior decompression for cervical myelopathy. Post-op improvement in RUE symptoms. Recently hit head -> post-traumatic BPPV. Seen in [facility] in ED, improving recently. No new MS symptoms and walking speed stable. MRI stable without new or active lesions. labs acceptable with only slight reduction in ALC and IGG. He  pushed his next ocrevus to January for deductible purposes, which is fine as he never had early B cell repletion.      06/26/2023: He did not pass the latest physical for work and had to retire. been frustrated about that. right leg is getting stiffer and right arm getting harder to use despite the transient improvement last year after cervical repeat surgery. no infections. blood work with stable mild reduction in IgG and ALC. MRI done at an OSH due to insurance issues and the CD he brought in was empty. 25-f-w is stable but gait is more spsatic. will increase baclofen to 30 mg BID.     1/18/2024: After Dr. Willis left , he went to [facility] for evaluation of progressive RUE weakness and new dystonic flexion of the right fingers. He was seen by neurosurgery and had repeat spine MRIs in November that were stable. He hand an EMG done that showed no evidence of ulnar neuropathy or motor neuron disease. He saw Dr. Hopper at the [facility] who agreed with ocrevus treatment and referred him to the spasticity clinic where he was deemed unsuitable for botox for spasticity. He was treated empirically with high dose IV steroids followed by prednisone taper at the [facility] with subsequent improvement in foot lift and gait but without effect on his RUE. He then had an URTI that required antibiotic therapy. He had an xray that was negative for PNA but showed some abdominal hyperdensity that he is undergoing CT abdomen for soon. He is noticing a change in his trunk/abdomen shape and a leaning towards the right when walking. His last ocrevus labs from last week showed a decrease in IgG level but normal ALC. On exam he does have new grade 1-2 spasticity in the right fingers with mild dynamic dystonic flexion of the fingers. I think he can be a good candidate for a low dose botox to the finger flexors (25 FDS and 25 FDP) with the caveat of potentially causing finger weakness. He wants to think about it and let me know. His RUE  weakness and spasticity are clearly directly related to his double cervical pathology including demyelination and compressive myelopathy. I don't think it is necessary to look for additional causes. He has PPMS and his disease is expected to progress despite ocrevus. He may need IVIG replacement in the future if he develops serious infections. Although his favorable response to pulse steroid therapy is encouraging, we will have to be conservative with steroids given his increased risk of infection in the setting of ocrevus and iatrogenic hypogammaglobulinemia.     7/16/2024: Continues to struggle with worsening spasticity of the right hemibody including leaning to one side with truncal indentation on the left. No GI cause was found. His right hand continues to get weaker and his right arm and fingers continue to be semiflexed while walking. Had another EMG at [facility] that was reportedly normal. Still not decided on botox but is scheduled with me in August. Still experiencing neuropathic pain in the RUE. MRI stable. IGG still low but ALC normal. No infections. He is changing insurance in November and is worried about continuation of coverage    8/29/2024: Labs from July acceptable. Here for his first botox injection session. Tolerated well.     Plan:     - Acute relapse management: Not indicated. He had a good response to empiric steroids in 2023 so may consider this in the future if his symptoms get worse but we have to be conservative with this approach given his low IGG levels.       - Continue Ocrevus.      - Labs: will get labs before the next infusion.      - Will order MRI of the brain WWO to be done in July of 2025.      - Symptomatic management: continue same doses of baclofen to 40 mg BID and tizanidine 4 mg at night. Continue gabapenitn to 300 mg QID. Continue botox every three months if effective and well tolerated.      - Vitamin D: continue 5000 units daily.      - Smoking: non smoker      - PT/OT:  continue     - Instructions: keep an active life style and develop exercise routine as tolerated. Recommend a balanced healthy diet. Avoid excessive amounts of salt, carbs, fat, and red meat. Increase intake of fruits, vegetables, and white meat.      - Follow up: after three months for botox.      - The impression and plan were discussed with the patient and their family (if present) in detail and all questions answered. They agreed to the plan as outlined above.            Vern Patel MD, PhD   of Neurology  [facility] [employer]  Director of Neuroimmunology and staff neurologist at the [facility]  [facility]        Physical Exam

## 2024-08-29 NOTE — PATIENT INSTRUCTIONS
You were injected with a total of 75 units of botox. You tolerated the procedure well. The effect of botox usually kicks in after 3 to 5 days and lasts for three months. Please call office after about a week to report how you are doing. Follow up after three months for repeat injections.     Please continue the same dose of baclofen and tizanidine.     Please continue physical and occupational therapy.     Please continue daily stretching.     Your labs from July were all acceptable.     Thank you visiting the clinic today      Vern Patel MD

## 2024-09-12 ENCOUNTER — APPOINTMENT (OUTPATIENT)
Dept: PHYSICAL THERAPY | Facility: CLINIC | Age: 59
End: 2024-09-12
Payer: COMMERCIAL

## 2024-09-12 DIAGNOSIS — R26.89 BALANCE PROBLEM: ICD-10-CM

## 2024-09-12 DIAGNOSIS — R29.898 DECREASED ROM OF NECK: ICD-10-CM

## 2024-09-12 DIAGNOSIS — M54.2 NECK PAIN: ICD-10-CM

## 2024-09-12 DIAGNOSIS — R26.9 ABNORMALITY OF GAIT: Primary | ICD-10-CM

## 2024-09-12 PROCEDURE — 97110 THERAPEUTIC EXERCISES: CPT | Mod: GP

## 2024-09-12 NOTE — PROGRESS NOTES
"Physical Therapy Treatment      Patient Name: Marshall Padilla  MRN: [id number]  Today's Date: 9/12/2024  Visit #: 15  Insurance: 20vs/yr   Time Calculation  Start Time: 1047  Stop Time: 1130  Time Calculation (min): 43 min    1. Abnormality of gait        2. Balance problem        3. Decreased ROM of neck        4. Neck pain                    ASSESSMENT:  Good participation throughout.  Demoing apparent weakness in R UE and R LE as compared to last saw his therapist about 3 weeks ago.  This may be secondary to the Botox in the R arm, but seems unlikely to affect the R LE.  Encouraged to reach out to Dr. Patel regarding this.  Updated HEP.    GOALS:  Improvement in gait mechanics without accessory / compensatory swing strategies for advancement of R LE.     Reciprocal stair navigation without vaulting of L LE     No falls during POC.     Improvements in strength of R hip, knee and ankle flexion by half a grade.     Subjective reports of improvements in posture.     Other goals to be updated throughout POC.    PLAN:  Continue with PT POC    SUBJECTIVE:    Worst two weeks has ever had.  Had Botox two weeks ago in R arm.  Feels like arm is \"useless.\"  Feels like can't use utensils when eating.  Dexterity and strength has dramatically reduced.  Been doing dowel activities and wants to review it with some weight.  Wasn't done in R LE and feels off.  L knee isn't so sensitive to the touch anymore, but still hurts, notices it when pivots especially.  Been hurting both knees when doing leg extensions.  Fatigue has increased.  Doesn't feel like a relapse of his MS.      OBJECTIVE:    TREATMENT:    Therapeutic Exercise (66688): 43 minutes    Dowel UE Exercises:  - OH Front Raise x 6  - Supinated Curl x 6  - Pronated Curl + OH Press x 6  ^^ superset with 4lb, 6lb, 9lb, 10lb -- discussed how to do these at home, order, and weight  - Skull Crushes 4lb - 3 sets x 10 (lowering to neck instead of overhead)    Cybex Leg Curls -- 40lbs, 3 " "sets x 6-8, using B LE to assist as unable to perform R LE only; 1 sets x fatigue, 10lbs R LE only -- performing to encourage recruitment of R HS to carryover into other activities.  More fatigued.        HEP:    Standing Banded Squats  L LE Tap Down Hinge and Squat on 2\" Riser  Leg Extensions  ^ 2-3 days per week.  Educated on pace, performance and purpose to work on addressing patient's L LE discomfort.    "

## 2024-09-14 DIAGNOSIS — G35 MULTIPLE SCLEROSIS (MULTI): Primary | ICD-10-CM

## 2024-09-30 ENCOUNTER — APPOINTMENT (OUTPATIENT)
Dept: PHYSICAL THERAPY | Facility: CLINIC | Age: 59
End: 2024-09-30
Payer: COMMERCIAL

## 2024-09-30 DIAGNOSIS — R29.898 DECREASED ROM OF NECK: ICD-10-CM

## 2024-09-30 DIAGNOSIS — R26.89 BALANCE PROBLEM: ICD-10-CM

## 2024-09-30 DIAGNOSIS — R26.9 ABNORMALITY OF GAIT: Primary | ICD-10-CM

## 2024-09-30 DIAGNOSIS — M54.2 NECK PAIN: ICD-10-CM

## 2024-09-30 PROCEDURE — 97110 THERAPEUTIC EXERCISES: CPT | Mod: GP

## 2024-09-30 PROCEDURE — 97530 THERAPEUTIC ACTIVITIES: CPT | Mod: GP

## 2024-09-30 NOTE — PROGRESS NOTES
"Physical Therapy Treatment      Patient Name: Marshall Padilla  MRN: [id number]  Today's Date: 9/30/2024  Visit #: 16  Insurance: 20vs/yr   Time Calculation  Start Time: 1015  Stop Time: 1100  Time Calculation (min): 45 min    1. Abnormality of gait        2. Balance problem        3. Decreased ROM of neck        4. Neck pain                      ASSESSMENT:  Much time spent in discussion this session.  Receptive.  Improved R LE function this date compared to last, which is promising.  No adverse events.    GOALS:  Improvement in gait mechanics without accessory / compensatory swing strategies for advancement of R LE.     Reciprocal stair navigation without vaulting of L LE     No falls during POC.     Improvements in strength of R hip, knee and ankle flexion by half a grade.     Subjective reports of improvements in posture.     Other goals to be updated throughout POC.    PLAN:  Continue with PT POC    SUBJECTIVE:    Had trip and had a \"great time.\"  Started group therapy with Dr. Geoff PsyD at [facility].  Got in contact with  for AFO for R LE.  Been using the curl bar for exercises instead of dowel because puts him in a good position.  Been doing some specific step up and squat exercises for R LE.  Been catching R toe.      OBJECTIVE:    TREATMENT:    Therapeutic Activity (25522):  30 minutes    Discussed at length CLOF.  Discussed the challenges, especially as it comes to loss of function and the snowball of this with weakness and disuse.  Discussed CIMT principles and applying that to life as possible.  Discussed AFO and when to use it and the importance of working on R DF.    Therapeutic Exercise (31448):  15 minutes    Single Arm Static Holds Against wall -- 15 seconds, 4 sets, cues for weight bearing.    Cybex Leg Curls -- 40lbs, 3 sets x 10; 10lbs, until fatigue with R LE only -- performing to encourage recruitment of R HS to carryover into other activities.  More fatigued.        HEP:    Standing Banded " "Squats  L LE Tap Down Hinge and Squat on 2\" Riser  Leg Extensions  ^ 2-3 days per week.  Educated on pace, performance and purpose to work on addressing patient's L LE discomfort.    "

## 2024-10-08 ENCOUNTER — APPOINTMENT (OUTPATIENT)
Dept: PHYSICAL THERAPY | Facility: CLINIC | Age: 59
End: 2024-10-08
Payer: COMMERCIAL

## 2024-10-08 ENCOUNTER — TELEPHONE (OUTPATIENT)
Dept: NEUROLOGY | Facility: CLINIC | Age: 59
End: 2024-10-08

## 2024-10-08 DIAGNOSIS — R26.9 ABNORMALITY OF GAIT: Primary | ICD-10-CM

## 2024-10-08 DIAGNOSIS — M54.2 NECK PAIN: ICD-10-CM

## 2024-10-08 DIAGNOSIS — R26.89 BALANCE PROBLEM: ICD-10-CM

## 2024-10-08 DIAGNOSIS — R29.898 DECREASED ROM OF NECK: ICD-10-CM

## 2024-10-08 PROCEDURE — 97110 THERAPEUTIC EXERCISES: CPT | Mod: GP

## 2024-10-08 NOTE — TELEPHONE ENCOUNTER
Marshall Padilla called. He would like to request a new script for Gabapentin 300mg 1 tab 4 times daily  360 with 3 refills. Please send to [facility], on file.

## 2024-10-09 DIAGNOSIS — G35 MULTIPLE SCLEROSIS (MULTI): ICD-10-CM

## 2024-10-09 RX ORDER — GABAPENTIN 300 MG/1
300 CAPSULE ORAL 4 TIMES DAILY
Qty: 120 CAPSULE | Refills: 5 | Status: SHIPPED | OUTPATIENT
Start: 2024-10-09

## 2024-10-17 ENCOUNTER — APPOINTMENT (OUTPATIENT)
Dept: PHYSICAL THERAPY | Facility: CLINIC | Age: 59
End: 2024-10-17
Payer: COMMERCIAL

## 2024-10-17 DIAGNOSIS — R26.9 ABNORMALITY OF GAIT: Primary | ICD-10-CM

## 2024-10-17 DIAGNOSIS — M54.2 NECK PAIN: ICD-10-CM

## 2024-10-17 DIAGNOSIS — R29.898 DECREASED ROM OF NECK: ICD-10-CM

## 2024-10-17 DIAGNOSIS — R26.89 BALANCE PROBLEM: ICD-10-CM

## 2024-10-17 PROCEDURE — 97530 THERAPEUTIC ACTIVITIES: CPT | Mod: GP

## 2024-10-17 PROCEDURE — 97110 THERAPEUTIC EXERCISES: CPT | Mod: GP

## 2024-10-17 NOTE — PROGRESS NOTES
"Physical Therapy Treatment      Patient Name: Marshall Padilla  MRN: [id number]  Today's Date: 10/17/2024  Visit #: 18  Insurance: 20vs/yr   Time Calculation  Start Time: 0910  Stop Time: 0950  Time Calculation (min): 40 min    1. Abnormality of gait        2. Balance problem        3. Decreased ROM of neck        4. Neck pain                          ASSESSMENT:  Much time spent in discussing AFO use and reviewing exercises / HEP.  No adverse events.  Receptive.    GOALS:  Improvement in gait mechanics without accessory / compensatory swing strategies for advancement of R LE.     Reciprocal stair navigation without vaulting of L LE     No falls during POC.     Improvements in strength of R hip, knee and ankle flexion by half a grade.     Subjective reports of improvements in posture.     Other goals to be updated throughout POC.    PLAN:  Continue with PT POC    SUBJECTIVE:    Got AFO and brought it in.  Getting used to it and it feels \"nice.\"  New insurance starts November 1.      OBJECTIVE:    TREATMENT:    Therapeutic Exercise (58823):  25 minutes    Cybex Leg Curls -- 40lbs, 4 sets x 10; 10lbs, until fatigue with R LE only, focusing on eccentric -- performing to encourage recruitment of R HS to carryover into other activities.  More fatigued.    Discussed benefit of treadmill walking.    Discussed R LE position / knee position with step-down / squats on stairs    Therapeutic Activity (34246):  15 minutes    Gait with AFO.    Stairs with AFO.    Donning AFO.    ---     HEP:    Standing Banded Squats  L LE Tap Down Hinge and Squat on 2\" Riser  Leg Extensions  ^ 2-3 days per week.  Educated on pace, performance and purpose to work on addressing patient's L LE discomfort.    "

## 2024-10-24 ENCOUNTER — APPOINTMENT (OUTPATIENT)
Dept: PHYSICAL THERAPY | Facility: CLINIC | Age: 59
End: 2024-10-24
Payer: COMMERCIAL

## 2024-10-24 DIAGNOSIS — R26.9 ABNORMALITY OF GAIT: Primary | ICD-10-CM

## 2024-10-24 DIAGNOSIS — R29.898 DECREASED ROM OF NECK: ICD-10-CM

## 2024-10-24 DIAGNOSIS — M54.2 NECK PAIN: ICD-10-CM

## 2024-10-24 DIAGNOSIS — R26.89 BALANCE PROBLEM: ICD-10-CM

## 2024-10-24 PROCEDURE — 97110 THERAPEUTIC EXERCISES: CPT | Mod: GP

## 2024-10-24 NOTE — PROGRESS NOTES
"Physical Therapy Treatment      Patient Name: Marshall Padilla  MRN: [id number]  Today's Date: 10/24/2024  Visit #: 19  Insurance: 20vs/yr   Time Calculation  Start Time: 1131  Stop Time: 1215  Time Calculation (min): 44 min    1. Abnormality of gait        2. Balance problem        3. Decreased ROM of neck        4. Neck pain                ASSESSMENT:  Good participation throughout.  Focus on R posterior chain activation.  No adverse events.  Receptive to all education and discussion.    GOALS:  Improvement in gait mechanics without accessory / compensatory swing strategies for advancement of R LE.     Reciprocal stair navigation without vaulting of L LE     No falls during POC.     Improvements in strength of R hip, knee and ankle flexion by half a grade.     Subjective reports of improvements in posture.     Other goals to be updated throughout POC.    PLAN:  Continue with PT POC    SUBJECTIVE:    Had crazy shock again down L side when turned toward L.  Been having more stiffness in the neck lately.  Unsure if because of exercises.  Curious if is overdoing it with exercise.      OBJECTIVE:    TREATMENT:    Therapeutic Exercise ([id number]):  44 minutes    Cybex Leg Curls -- 45lbs (increased), 4 sets x 10; 10lbs, until fatigue with R LE only, focusing on eccentric -- performing to encourage recruitment of R HS to carryover into other activities.    Cable Stack Hip Extensions -- 4 sets x 10 each LE, 7.5lbs -- cues for squeezing glutes on R side.  Cues for anterior lean and greater ROM    Prone Hip Extension -- 3 sets x 8 R LE, cues for proper performance and glute recruitment / engagement.     Discussed R Posterior Chain Activation:   - Hip Extension Prone  - SL Bridge  - Standing Band Hip Extensions  - Seated HS Curls  - Incline Walking    ---     HEP:    Standing Banded Squats  L LE Tap Down Hinge and Squat on 2\" Riser  Leg Extensions  ^ 2-3 days per week.  Educated on pace, performance and purpose to work on addressing " patient's L LE discomfort.

## 2024-10-29 ENCOUNTER — APPOINTMENT (OUTPATIENT)
Dept: PHYSICAL THERAPY | Facility: CLINIC | Age: 59
End: 2024-10-29
Payer: COMMERCIAL

## 2024-10-29 DIAGNOSIS — R29.898 DECREASED ROM OF NECK: ICD-10-CM

## 2024-10-29 DIAGNOSIS — R26.89 BALANCE PROBLEM: ICD-10-CM

## 2024-10-29 DIAGNOSIS — R26.9 ABNORMALITY OF GAIT: Primary | ICD-10-CM

## 2024-10-29 DIAGNOSIS — M54.2 NECK PAIN: ICD-10-CM

## 2024-10-29 PROCEDURE — 97110 THERAPEUTIC EXERCISES: CPT | Mod: GP

## 2025-01-08 ENCOUNTER — TELEPHONE (OUTPATIENT)
Dept: NEUROLOGY | Facility: CLINIC | Age: 60
End: 2025-01-08
Payer: COMMERCIAL

## 2025-01-08 DIAGNOSIS — R25.2 CRAMP AND SPASM: Primary | ICD-10-CM

## 2025-01-08 RX ORDER — BACLOFEN 20 MG/1
40 TABLET ORAL 2 TIMES DAILY
Qty: 360 TABLET | Refills: 5 | Status: SHIPPED | OUTPATIENT
Start: 2025-01-08

## 2025-01-08 NOTE — TELEPHONE ENCOUNTER
Marshall Padilla called. He would like to request a refill of   Baclofen 20mg 2 tabs BID. #360 with 3 refills to [facility] (on file)

## 2025-01-09 ENCOUNTER — APPOINTMENT (OUTPATIENT)
Dept: NEUROLOGY | Facility: CLINIC | Age: 60
End: 2025-01-09
Payer: COMMERCIAL

## 2025-01-15 ENCOUNTER — APPOINTMENT (OUTPATIENT)
Dept: INFUSION THERAPY | Facility: CLINIC | Age: 60
End: 2025-01-15
Payer: COMMERCIAL

## 2025-01-15 VITALS
HEART RATE: 65 BPM | TEMPERATURE: 97.8 F | WEIGHT: 211.31 LBS | SYSTOLIC BLOOD PRESSURE: 111 MMHG | BODY MASS INDEX: 26.41 KG/M2 | RESPIRATION RATE: 17 BRPM | DIASTOLIC BLOOD PRESSURE: 75 MMHG | OXYGEN SATURATION: 99 %

## 2025-01-15 DIAGNOSIS — G35 MULTIPLE SCLEROSIS (MULTI): ICD-10-CM

## 2025-01-15 DIAGNOSIS — Z79.899 DRUG-INDUCED IMMUNODEFICIENCY (MULTI): ICD-10-CM

## 2025-01-15 DIAGNOSIS — D84.821 DRUG-INDUCED IMMUNODEFICIENCY (MULTI): ICD-10-CM

## 2025-01-15 LAB
BASOPHILS # BLD AUTO: 0.05 X10*3/UL (ref 0–0.1)
BASOPHILS NFR BLD AUTO: 0.9 %
EOSINOPHIL # BLD AUTO: 0.14 X10*3/UL (ref 0–0.7)
EOSINOPHIL NFR BLD AUTO: 2.5 %
ERYTHROCYTE [DISTWIDTH] IN BLOOD BY AUTOMATED COUNT: 12.6 % (ref 11.5–14.5)
HCT VFR BLD AUTO: 40.6 % (ref 41–52)
HGB BLD-MCNC: 14 G/DL (ref 13.5–17.5)
IMM GRANULOCYTES # BLD AUTO: 0.01 X10*3/UL (ref 0–0.7)
IMM GRANULOCYTES NFR BLD AUTO: 0.2 % (ref 0–0.9)
LYMPHOCYTES # BLD AUTO: 1.32 X10*3/UL (ref 1.2–4.8)
LYMPHOCYTES NFR BLD AUTO: 23.2 %
MCH RBC QN AUTO: 31 PG (ref 26–34)
MCHC RBC AUTO-ENTMCNC: 34.5 G/DL (ref 32–36)
MCV RBC AUTO: 90 FL (ref 80–100)
MONOCYTES # BLD AUTO: 0.39 X10*3/UL (ref 0.1–1)
MONOCYTES NFR BLD AUTO: 6.9 %
NEUTROPHILS # BLD AUTO: 3.78 X10*3/UL (ref 1.2–7.7)
NEUTROPHILS NFR BLD AUTO: 66.3 %
NRBC BLD-RTO: 0 /100 WBCS (ref 0–0)
PLATELET # BLD AUTO: 275 X10*3/UL (ref 150–450)
RBC # BLD AUTO: 4.51 X10*6/UL (ref 4.5–5.9)
WBC # BLD AUTO: 5.7 X10*3/UL (ref 4.4–11.3)

## 2025-01-15 PROCEDURE — 96415 CHEMO IV INFUSION ADDL HR: CPT | Performed by: REGISTERED NURSE

## 2025-01-15 PROCEDURE — 88184 FLOWCYTOMETRY/ TC 1 MARKER: CPT

## 2025-01-15 PROCEDURE — 85025 COMPLETE CBC W/AUTO DIFF WBC: CPT

## 2025-01-15 PROCEDURE — 88185 FLOWCYTOMETRY/TC ADD-ON: CPT

## 2025-01-15 PROCEDURE — 96375 TX/PRO/DX INJ NEW DRUG ADDON: CPT | Performed by: REGISTERED NURSE

## 2025-01-15 PROCEDURE — 88187 FLOWCYTOMETRY/READ 2-8: CPT | Performed by: PSYCHIATRY & NEUROLOGY

## 2025-01-15 PROCEDURE — 82784 ASSAY IGA/IGD/IGG/IGM EACH: CPT

## 2025-01-15 PROCEDURE — 96413 CHEMO IV INFUSION 1 HR: CPT | Performed by: REGISTERED NURSE

## 2025-01-15 RX ORDER — EPINEPHRINE 0.3 MG/.3ML
0.3 INJECTION SUBCUTANEOUS EVERY 5 MIN PRN
OUTPATIENT
Start: 2025-07-13

## 2025-01-15 RX ORDER — DIPHENHYDRAMINE HCL 25 MG
25 CAPSULE ORAL ONCE
Status: COMPLETED | OUTPATIENT
Start: 2025-01-15 | End: 2025-01-15

## 2025-01-15 RX ORDER — DIPHENHYDRAMINE HCL 25 MG
25 CAPSULE ORAL ONCE
OUTPATIENT
Start: 2025-07-13

## 2025-01-15 RX ORDER — ACETAMINOPHEN 325 MG/1
650 TABLET ORAL ONCE
Status: COMPLETED | OUTPATIENT
Start: 2025-01-15 | End: 2025-01-15

## 2025-01-15 RX ORDER — ALBUTEROL SULFATE 0.83 MG/ML
3 SOLUTION RESPIRATORY (INHALATION) AS NEEDED
OUTPATIENT
Start: 2025-07-13

## 2025-01-15 RX ORDER — DIPHENHYDRAMINE HYDROCHLORIDE 50 MG/ML
50 INJECTION INTRAMUSCULAR; INTRAVENOUS AS NEEDED
OUTPATIENT
Start: 2025-07-13

## 2025-01-15 RX ORDER — ACETAMINOPHEN 325 MG/1
650 TABLET ORAL ONCE
OUTPATIENT
Start: 2025-07-13

## 2025-01-15 RX ORDER — FAMOTIDINE 10 MG/ML
20 INJECTION INTRAVENOUS ONCE AS NEEDED
OUTPATIENT
Start: 2025-07-13

## 2025-01-15 RX ADMIN — ACETAMINOPHEN 650 MG: 325 TABLET ORAL at 09:15

## 2025-01-15 RX ADMIN — Medication 25 MG: at 09:15

## 2025-01-15 ASSESSMENT — ENCOUNTER SYMPTOMS
FATIGUE: 1
SHORTNESS OF BREATH: 1
APPETITE CHANGE: 1
NECK PAIN: 1
NECK STIFFNESS: 1
MYALGIAS: 1
TROUBLE SWALLOWING: 1
NUMBNESS: 1
CARDIOVASCULAR NEGATIVE: 1
ARTHRALGIAS: 1
GASTROINTESTINAL NEGATIVE: 1
BACK PAIN: 1
COUGH: 1
EXTREMITY WEAKNESS: 1

## 2025-01-15 NOTE — PROGRESS NOTES
[facility]   Infusion Clinic Note   Date: January 15, 2025   Name: Marshall Padilla  : 1965   MRN: [id number]          Reason for Visit: OP Infusion (PATIENT HERE FOR Q 6 MONTH OCREVUS 600MG INFUSION)         Today: We administered acetaminophen, methylPREDNISolone sod succinate, diphenhydrAMINE, and ocrelizumab (Ocrevus) 600 mg in sodium chloride 0.9% 520 mL IV.       Visit Type: INFUSION       Ordered By: Vern Patel MD        Accompanied by:Self       Diagnosis: Drug-induced immunodeficiency (Multi)    Multiple sclerosis (Multi)        Allergies:   Allergies as of 01/15/2025 - Reviewed 01/15/2025   Allergen Reaction Noted    Grass pollen Itching and Runny nose 1985          Current Medications has a current medication list which includes the following prescription(s): acetaminophen, albuterol, atorvastatin, baclofen, cholecalciferol, cyanocobalamin (vitamin b-12), gabapentin, ocrelizumab, pantoprazole, and tizanidine, and the following Facility-Administered Medications: onabotulinumtoxina.       Vitals:   Vitals:    01/15/25 0857 01/15/25 1011 01/15/25 1040 01/15/25 1126   BP: 147/82 107/71 102/67 109/70   Pulse: 68 61 67 63   Resp: 17 16 16 16   Temp: 36.5 °C (97.7 °F) 36.3 °C (97.3 °F) 36.3 °C (97.4 °F) 36.2 °C (97.2 °F)   SpO2: 97% 98% 98% 98%   Weight: 95.8 kg (211 lb 5 oz)       01/15/25 1200 01/15/25 1243   BP: 112/61 111/75   Pulse: 55 65   Resp: 18 17   Temp: 36.7 °C (98.1 °F) 36.6 °C (97.8 °F)   SpO2: 99% 99%   Weight:               Infusion Pre-procedure Checklist:   - Allergies reviewed: yes   - Medications reviewed: yes       - Previous reaction to current treatment: no      Assess patient for the concerns below. Document provider notification as appropriate   - Active or recent infection with/without current antibiotic use: yes MD AWARE; OK TO INFUSE  - Recent or planned invasive dental work: no  - Recent or planned surgeries: no  - Recently received or plans to  "receive vaccinations: no  - Has treatment related toxicities: n/a  - Is pregnant:  NA      Pain: 2 NECK AND RIGHT ARM   - Is the pain different from normal: no   - Is your Doctor aware:  yes       Labs: Labs drawn and sent per order          Fall Risk Screening: Flaca Fall Risk  History of Falling, Immediate or Within 3 Months: No  Secondary Diagnosis: Yes  Intravenous Therapy/Heparin Lock: No  Gait/Transferring: Impaired   Mental Status: Oriented to own ability       Review Of Systems:  Review of Systems   Constitutional:  Positive for appetite change and fatigue.   HENT:   Positive for trouble swallowing.    Respiratory:  Positive for cough and shortness of breath.         RECOVERING COUGH; ON ABX   Cardiovascular: Negative.    Gastrointestinal: Negative.    Musculoskeletal:  Positive for arthralgias, back pain, gait problem, myalgias, neck pain and neck stiffness.   Skin: Negative.    Neurological:  Positive for extremity weakness (RIGHT SIDE), gait problem and numbness.         ROS completed? Yes      Infusion Readiness:  - Assessment Concerns Related to Infusion: No  - Provider notified: n/a      Document Below Only If Indicated:   New Patient Education:    N/A (returning patient for continuation of therapy. Ongoing education provided as needed.)        Treatment Conditions & Drug Specific Questions:    Ocrelizumab  (OCREVUS)    (Unless otherwise specified on patient specific therapy plan):     TREATMENT CONDITIONS:  Unless otherwise specified on patient specific therapy plan HOLD and notify provider prior to proceeding with today's infusion if patient with:  o Positive Hepatitis B Surface Ag or panel  o Positive T-Spot    No results found for: \"TBSIN\", \"TBGRES\", \"QFG\", \"TSPOTR\"   No results found for: \"HAGCN\", \"HEPBSURABI\", \"HBSAG\", \"XHAGF\", \"HEPBSAG\", \"EXTHEPBSAG\", \"NONUHSWGH\"   No results found for: \"HEPATOT\", \"HEPAIGM\", \"HEPBCIGM\", \"HEPBCAB\", \"HBEAG\", \"HEPCAB\"   No results found for: \"NONUHFIRE\", " "\"NONUHSWGH\", \"NONUHFISH\", \"EXTHEPBSAG\"    Labs reviewed and patient meets treatment conditions? Yes    DRUG SPECIFIC QUESTIONS:  Any signs or symptoms of Progressive multifocal leukoencephalopathy (PML) which may include: memory loss, trouble thinking, dizziness, loss of balance, difficulty talking / walking or loss of vision? No    (If YES HOLD and notify provider)      REMINDER:  Recommended Vitals/Observation:  Vitals: Obtain vital signs every 30 minutes / with each ramp up. Once maximum rate is reached obtain vitals hourly until infusion is complete. Obtain vitals at end of observation period and PRN.  Observation: Observe patient for 60 minutes after each infusion. Observation complete.          Weight Based Drug Calculations:    WEIGHT BASED DRUGS: NOT APPLICABLE / FLAT DOSE          Initiated By: Patience Martinez RN        "

## 2025-01-15 NOTE — PATIENT INSTRUCTIONS
Thank you for choosing [facility] and letting us take care of you today!    NEXT APPOINTMENT NEEDED FOR PATIENT: 6 Months    If you need to call us for any reason: Cancel, Schedule, or Reschedule please call us at [phone].     Our hours of operation are **Starting July 15--our hours are changing to Monday through Friday 7am-7pm, and Saturdays from 7am-330pm**. We are still closed on Sundays and Holidays**    Please call us if you are sick, have surgery, dental work, or your physician wants your visit rescheduled.     Any insurance changes will need approval from your new insurance and this can take up to two weeks.     If you call the center and no one answers please leave us a voicemail and someone from our office with get back to you within 24 hours. If you call on a weekend or a holiday we will return your call back on the next open business day.    Go to the Emergency Department or call 911 if you experience the following:    -You have signs of an allergic reaction  -Rash, hives, itching  -Swollen, blistered, peeling skin  -Swelling of face, lips, mouth, tongue or throat  -Tightness of chest, trouble breathing, swallowing, or talking    Call your doctor:     -If IV/Injection site gets warm, swollen, itchy or leaks fluid or pus  (Leave dressing on your IV site for at least 2 hours and keep the area clean and dry    -If you get sick or have symptoms of infection or are not feeling well for any reason    -If you have any side effects from your medication that do not go away or are bothersome.    -Some side effects may include: Stuffy nose, headache, feeling tired, muscle aches, and upset stomach.

## 2025-01-16 LAB
IGA SERPL-MCNC: 41 MG/DL (ref 70–400)
IGG SERPL-MCNC: 558 MG/DL (ref 700–1600)
IGM SERPL-MCNC: 39 MG/DL (ref 40–230)

## 2025-01-18 LAB
CD19 CELLS # BLD: 0 X10E9/L
CD19 CELLS NFR BLD: 0 %
FLOW CYTOMETRY SPECIALIST REVIEW: ABNORMAL
LYMPHOCYTES # SPEC AUTO: 1.32 X10*3/UL
PATH REVIEW, B CELL PHENOTYPING, EXTENDED: ABNORMAL

## 2025-01-28 ENCOUNTER — OFFICE VISIT (OUTPATIENT)
Dept: NEUROLOGY | Facility: HOSPITAL | Age: 60
End: 2025-01-28
Payer: COMMERCIAL

## 2025-01-28 VITALS
HEIGHT: 75 IN | RESPIRATION RATE: 18 BRPM | SYSTOLIC BLOOD PRESSURE: 139 MMHG | TEMPERATURE: 97.1 F | DIASTOLIC BLOOD PRESSURE: 83 MMHG | WEIGHT: 211 LBS | HEART RATE: 87 BPM | BODY MASS INDEX: 26.24 KG/M2

## 2025-01-28 DIAGNOSIS — R13.13 PHARYNGEAL DYSPHAGIA: Primary | ICD-10-CM

## 2025-01-28 DIAGNOSIS — G35 MULTIPLE SCLEROSIS (MULTI): ICD-10-CM

## 2025-01-28 PROCEDURE — 99417 PROLNG OP E/M EACH 15 MIN: CPT | Performed by: PSYCHIATRY & NEUROLOGY

## 2025-01-28 PROCEDURE — 99215 OFFICE O/P EST HI 40 MIN: CPT | Performed by: PSYCHIATRY & NEUROLOGY

## 2025-01-28 PROCEDURE — 1036F TOBACCO NON-USER: CPT | Performed by: PSYCHIATRY & NEUROLOGY

## 2025-01-28 PROCEDURE — 3008F BODY MASS INDEX DOCD: CPT | Performed by: PSYCHIATRY & NEUROLOGY

## 2025-01-28 RX ORDER — TIZANIDINE 2 MG/1
4 TABLET ORAL 2 TIMES DAILY
Qty: 60 TABLET | Refills: 6 | Status: SHIPPED | OUTPATIENT
Start: 2025-01-28

## 2025-01-28 ASSESSMENT — PAIN SCALES - GENERAL: PAINLEVEL_OUTOF10: 6

## 2025-01-28 NOTE — PROGRESS NOTES
Chief Complaint  PPMS.      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 known: PPMS with activity  Date of onset: Incidentally found 6/2017, clinical onset 3/2018  Date of diagnosis: 10/19/2017 radiological diagnosis, 05/15/2018 clinical diagnosis  disease course at onset: progressive relapsing   disease course now: progressive relapsing   Current DMT: Ocrevus since June 2018  Previous DMT: none   Last MRI brain: 7/2024  Last MRI cervical: 11/2023  Last MRI thoracic: 11/2023  Last VEP: negative in 8/2017  CSF: done 9/2017 normal CSF, negative OCBs and IGG index  JCV: positive 0.6 9/2017  VZV: positive 9/2017  HEP panel: negative 9/2017  NMO: negative 9/2017     This is a 51 year old right handed White male patient who presented initially with right arm pain in the setting of significant C5-6 disc prolapse with radiculomyelopathy and an incidental finding of an unrelated demyelinating -like plaque at the CMJ. brain MRI showed only non specific deep white matter hyperintense lesions. He underwent a successful cervical decompression surgery with Dr. Willis in October 2017 and his RUE improved. LP was negative for MS markers but he started having progressive RLE weakness in March 2018. Repeat imaging showed a new enhancing lesion at T4 on the right side. A diagnosis of PPMS with activity and progression was made.      INTERVAL HISTORY:  His whole right side felt much weaker two weeks after botox then strength came back to baseline over the following few weeks. He has since continued to experience progressive RUE weakness and pain. Worsening paraethesia on the back. Worsening scoliosis. Sensitivity to cold. Worsening spasms and clonus in the right arm and leg. Labs from January showed normal ALC and undetectable B cells but continues to show low and declining IGG. No serious infection just URTI symptoms.      MS symptom review:     weakness: yes  RLE and RUE   sensory changes: yes presenting symptom, RUE  incoordination: no  falling: no  gait change: yes, limp  painful vision loss: no  double vision: no  vertigo: no  facial/bulbar weakness: no but had facial numbness and twitching, resolved  Lhermitte's: no  bladder/bowel dysfunction: urinary frequency      spasticity: yes, right leg now feels heavier and circumducts while walking, improved after baclofen.   tonic spasms: yes RUE isometric painful triggered by lying down at night. almost daily.   tremors: no  RLS: no  dystonia: no  other movement disorders: no     heat sensitivity: yes especially during work ()   fatigue: yes  depression: no  anxiety: yes major   cognitive changes: nothing major     DMT: Ocrevus as of 6/2018  Side effects: No     Vitamin D: 5000 units daily  symptomatic: baclofen 40 mg, helpful. Gabapentin. Amantadine is prescribed but he never took it. Botox started on 8/29/2024              Review of Systems  All systems reviewed and are negative except as mentioned in the HPI.        Active Problems  Problems    · Abnormal MRI, cervical spine (793.7) (R93.7)   · Abnormality of gait (781.2) (R26.9)   · Arm pain (729.5) (M79.603)   · right   · Balance problem (781.99) (R26.89)   · Cervical disc disease with myelopathy (722.71) (M50.00)   · Cervical myelopathy (721.1) (G95.9)   · Cervical radiculopathy (723.4) (M54.12)   · Chronic sinusitis (473.9) (J32.9)   · Decreased coordination (781.3) (R27.8)   · Decreased ROM of neck (723.8) (R29.898)   · Decreased strength, endurance, and mobility (780.79,780.99) (R53.1,R68.89,Z74.09)   · Depression with anxiety (300.4) (F41.8)   · Dizziness (780.4) (R42)   · Encounter for fitness for duty examination (V70.5) (Z02.89)   · Grippy cold (487.1) (J11.1)   · Immunosuppression (279.9) (D84.9)   · Multiple sclerosis, primary progressive (340) (G35)   · Myelitis (323.9) (G04.91)   · Neck pain (723.1) (M54.2)   · Neck stiffness (723.5) (M43.6)   ·  Numbness of arm (782.0) (R20.0)   · Otitis externa (380.10) (H60.90)   · Pain of right upper extremity (729.5) (M79.601)   · Paresthesia of arm (782.0) (R20.2)   · Paresthesias/numbness (782.0) (R20.9)   · Pins and needles sensation (782.0) (R20.2)   · right arm   · Right arm weakness (729.89) (R29.898)   · Right-sided muscle weakness (728.87) (M62.81)   · S/P cervical spinal fusion (V45.4) (Z98.1)   · ACDF 2017   · Sensation problem (782.0) (R20.9)   · Shingles (053.9) (B02.9)   · Shoulder pain, bilateral (719.41) (M25.511,M25.512)   · Spasticity (781.0) (R25.2)   · Temporal headache (784.0) (R51.9)   · Vitamin D deficiency (268.9) (E55.9)     Past Medical History  Problems    · No pertinent past medical history     Surgical History  Problems    · History of Lower Back Surgery   · History of Posterior cervical vertebral fusion   · 7/11/22     Family History  Mother    · No pertinent family history  Father    · Family history of hyperlipidemia (V18.19) (Z83.438)   · Family history of hypertension (V17.49) (Z82.49)  Aunt    · Family history of multiple sclerosis (V17.2) (Z82.0)     Social History  Problems    · Born in [place]   · Completed elementary school   · Completed high school   · Education Level: Some college   · Employed   · Has been a  since March of 1989.   ·    · Never a smoker   · Number of children   · Has a 13 and 17 year old sons.   · Occasional alcohol use     Allergies  Medication    · No Known Drug Allergies   Recorded By: James Mcfarland; 12/17/2015 9:39:57 AM        Physical Exam  GENERAL APPEARANCE: No distress, alert, interactive and cooperative.      CARDIOVASCULAR: Regular, rate and rhythm, no murmurs, normal S1 and S2. Pulses +2 and equal in all extremities. No swelling, varicosities, edema, or tenderness to palpation.      MENTAL STATE: Orientation was normal to time, place and person. Recent and remote memory was intact. Attention span and concentration were normal.  Language testing was normal for comprehension, naming, repetition and expression. General fund of knowledge was intact. Able to spell WORLD forwards and backwards.     OPHTHALMOSCOPIC: nl cup:disc ratio bilaterally     CRANIAL NERVES:   CN 2 Visual fields full to confrontation.   CN 3, 4, 6 Pupils round, 4 mm in diameter, equally reactive to light. Lids symmetric; no ptosis. EOMs normal alignment, full range with normal saccades, pursuit and convergence. No nystagmus.   CN 5 Facial sensation intact bilaterally to light touch & pinprick  CN 7 Normal and symmetric facial strength. Nasolabial folds symmetric.   CN 8 Hearing intact to conversation.  CN 9 Palate elevates symmetrically.   CN 11 Normal strength of shoulder shrug.  CN 12 Tongue midline, with normal bulk and strength; no fasciculations.      MOTOR: Muscle bulk was normal and tone was normal in both upper and lower extremities. No adventitious movements.      R L  Delt 5 5  Bicep 4+ 5  Tricep 4- 5   Wrist Flex 4 5   Wrist Ext 4+ 5  ADM 4 5     Hip Flex 4+ 5  Hip Add 5 5  Leg Ext 4 5  Leg Flex 4 5  DF 4- 5  PF 4 5      REFLEXES:   RIGHT UE LEFT UE   BR:2   BR:2   Biceps:2 Biceps:2   Triceps:2 Triceps:2      RIGHT LLE LEFT LLE   Knee:3+            Knee:3+   Ankle:3 Ankle:3      Bilateral Hood with R>L. No clonus, frontal release signs or other pathologic reflexes present.      SENSORY: Sensory exam was normal. In both upper and lower extremities, sensation was intact to light touch; sharp/dull.     COORDINATION: Coordination exam was normal. In both upper extremities, finger-nose-finger was intact without dysmetria or overshoot. In both lower extremities, heel-to-shin was intact. MITCH intact bilaterally. Fine finger movement intact.     GAIT: Gait was abnormal. Station was stable with a normal base. Gait was stable with a decreased rigth arm swing and speed. No ataxia, shuffling or waddling was present. R circumduction w/ slight steppage was present. Tandem  gait was intact. No Romberg sign was present.     Multiple sclerosis Functional Composite (MSFC):  25 foot walk: 4.3 s compared to 4.4 s compared to 3.6 s compared to 3.5 s compared to 3.5 s 3.9 s 4.3 s compared to 3.6 s compared to 4.6 s compared to 3.2 s in the last visit.      9-PEG-WHOLE:      Right 37.8 s  Left    18.1 s                                            Estimated EDSS: 2 up from 1 initially   Constitutional: General appearance: no acute distress   Mental status: The patient was in no distress, alert, interactive and cooperative. Affect is appropriate.   Orientation: oriented to person,~oriented to place~and~oriented to time.   Memory: recent memory intact~and~remote memory intact.   Attention: normal attention span~and~normal concentrating ability.   Language: normal comprehension~and~no difficulty naming common objects.   Fund of knowledge: Patient displays adequate knowledge of current events,~adequate fund of knowledge regarding past history~and~adequate fund of knowledge regarding vocabulary.   Cranial nerve II: Visual fields full to confrontation.~no red desaturation, no RAPD.   Cranial nerves III, IV, and VI: Pupils round, equally reactive to light; no ptosis. EOMs intact. No nystagmus.~no red desaturation.   Cranial Nerve V: Facial sensation intact bilaterally.   Cranial nerve VII: Normal and symmetric facial strength.   Cranial nerve VIII: Hearing is intact bilaterally to finger rub / whisper.   Cranial nerves IX and X: Palate elevates symmetrically.   Cranial nerve XI: Shoulder shrug and neck rotation strength are intact.   Cranial nerve XII: Tongue midline with normal strength.   Motor: Muscle bulk was normal in both upper and lower extremities.~Abnormal.~grade 1+ spasticity in the RLE with 3 to 4 beats of unsustained knee and ankle clonus. RUE tone may be sightly increased as well.~Abnormal.~RLE 4+/5 with partial right foot drop.~no abnormal or adventitious movements were present.   Deep  Tendon Reflexes: left biceps 3+,~right biceps 3+,~left triceps 2+,~right triceps 2+,~left brachioradialis 4+,~right brachioradialis 4+,~left patella 4+,~right patella 3+,~left ankle jerk 4+,~right ankle jerk 3+   Plantar Reflex: Toes downgoing to plantar stimulation on the left.~Toes upgoing to plantar stimulation on the right.   Coordination: There is no limb dystaxia and rapid alternating movements are intact.   Gait:. right circumduction gait.      Scores and Scales     Pyramidal Functions: (2) Minimal disability.   Cerebellar Functions: (0) Normal.   Brainstem Functions: (0) Normal.      Sensory Function: (0) Normal.       Bowel and Bladder Function: (0) Normal.      Visual Function: (0) Normal.         Cerebral (or Mental) Functions: (0) Normal.   EDSS - Expanded Disability Status: 2.0 - Minimal disability in one FS    Procedure:  Neurology Botulinum Injection    Verbal signout was taken. Mechanism of action, efficacy, side effects, risks, and alternatives associated with the medication were discussed and information material was provided. All questions answered.      Patient was placed in supine position. Muscle was prepped with alcohol. EMG-guided botox injections were injected to the following muscle on the right side: biceps 25 units, FDS 25 units, and FDP 25 units. He tolerated the procedure well.        Provider Impressions       Assessment:  This is a 51 year old White right handed male with PMH of lumbar disc disease and a family hx of MS who presented initially in August 2017 with right arm pain and subjective weakness. The neurological exam was positive for generalized pathological hyperreflexia. I have personally reviewed the brain MRIs from 6/2017 which showed a few non-enhancing T2 hyperintense lesions mainly in the deep white matter without any true periventricular, juxtacortical, or infratentorial lesions. I personally reviewed his cervical MRI which showed C5-6 disc herniation with cord  compression and myelomalecia. In addition, it showed a short non-enhancing demyelinating-like lesion at the cervicomedullary junction. CSF was normal including negative OCB and IGG index. VEP was normal. MS mimics ruled out including NMO. He underwent a successful cervical decompression surgery in 10/2017 with subsequent improvement in RUE symptoms. He was initially thought to have an incidental asymptomatic small area of demyelination in the upper cervical cord without evidence of dissemination in space or time and no markers of recurrence or disease progression. He was therefore deemed appropriate for monitoring off DMT.     05/14/2018: started having progressive RLE weakness, heaviness, and partial foot drop. Exam revealed new RLE weakness and mild spasticity.   05/15/2018 I personally reviewed his new MRI and compared them to the MRIs from 2017. MRI brain did not show active or new lesions compared to 2017. MRI cervical showed improved cord compression at the site of surgery and stable upper CMJ lesion (to my eyes looks somewhat bigger). thoracic MRI showed a new enhancing lesion at T4 on the right side. This confirms PPMS with activity. Patient is indicated for steroid therapy and Ocrevus.      11/20/2018: new transient left facial numbness and twitching but MRI negative for any new or active lesions in the brain or spinal cord. Likely pseudorelapse from a previous unnoticed relapse. tolerating acrevus well. baclofen helpful.      06/12/2019: MRI stable, worsening fatigue and spasms. Will start amantadine and increase baclofen.      12/03/2019: Some worsening of pre-existing symptoms in the RUE and RLE but exam stable or even improved compared to before. cervical XRAY showed some osseous bridging at the surgical site and Dr. Willis is considering a revision. The new symptoms could be expected fluctuation of MS symptoms or recurrent cord compression at the previous surgical site.      06/09/2020: He feels that  his right leg is getting weaker but his exam and 25-f-w are actually stable to improved compared to prior exams. He works as a  and is frequently exposed to COVID-19 patients but with full PPE. His brain and cervical MRIs from May did not show any changes compared to the prior MRI from last year. Will get Ig and CD19 levels. I advised against exposure to COVID-19 patients while on ocrevus.      12/09/2020: He feels that his right leg is still getting weaker but exam is stable to improved.. he tried a bioness but felt that he's not ready for it yet and the therapists agreed as well. His labs from June showed only slight lymphopenia and minor reduction in Igg. he saw Dr. Willis who had no further recs regarding his cervical myelopathy. Ocrevus is going well. Baclofen is helpful at the higher dose.     06/03/2021: He feels that his right arm and leg are still getting weaker and number. His right arm is also painful. It is hard for him now to use the right hand for cooking, playing basketball, and even driving. Exam remaisn stable with excellent walking speed. MRI brain and cervical stable. his latest labs still showed mild reduction of IgG and ALC. He took his first COVID vaccine in December and the second in January. He had received his ocrevus dose shortly before his first vaccine dose in December. will increase baclofen and gabapentin. will check labs including spike protein.      01/17/2022: continues with RUE pain and numbness and is getting a second opinion with another spine specialist. No change in his known RLE weakness and gait fatigue. He had sever left flank pain after the latest ocrevus infusion and went to the ER. No kidney stone was found. He became constipated later after he was given oxycodone for pain, now improving. He received COVID booster in October but still tested negative for the COVID19 spike antibody. Labs from [facility] still show mild reduction in ALC and IgG.      08/22/2022:  worsening sensorimotor deficits over right C6/7 distribution prompted posterior decompression for cervical myelopathy. Post-op improvement in RUE symptoms. Recently hit head -> post-traumatic BPPV. Seen in [facility] in ED, improving recently. No new MS symptoms and walking speed stable. MRI stable without new or active lesions. labs acceptable with only slight reduction in ALC and IGG. He pushed his next ocrevus to January for deductible purposes, which is fine as he never had early B cell repletion.      06/26/2023: He did not pass the latest physical for work and had to retire. been frustrated about that. right leg is getting stiffer and right arm getting harder to use despite the transient improvement last year after cervical repeat surgery. no infections. blood work with stable mild reduction in IgG and ALC. MRI done at an OSH due to insurance issues and the CD he brought in was empty. 25-f-w is stable but gait is more spsatic. will increase baclofen to 30 mg BID.     1/18/2024: After Dr. Willis left , he went to [facility] for evaluation of progressive RUE weakness and new dystonic flexion of the right fingers. He was seen by neurosurgery and had repeat spine MRIs in November that were stable. He hand an EMG done that showed no evidence of ulnar neuropathy or motor neuron disease. He saw Dr. Hopper at the [facility] who agreed with ocrevus treatment and referred him to the spasticity clinic where he was deemed unsuitable for botox for spasticity. He was treated empirically with high dose IV steroids followed by prednisone taper at the [facility] with subsequent improvement in foot lift and gait but without effect on his RUE. He then had an URTI that required antibiotic therapy. He had an xray that was negative for PNA but showed some abdominal hyperdensity that he is undergoing CT abdomen for soon. He is noticing a change in his trunk/abdomen shape and a leaning towards the right when walking. His last ocrevus labs from  last week showed a decrease in IgG level but normal ALC. On exam he does have new grade 1-2 spasticity in the right fingers with mild dynamic dystonic flexion of the fingers. I think he can be a good candidate for a low dose botox to the finger flexors (25 FDS and 25 FDP) with the caveat of potentially causing finger weakness. He wants to think about it and let me know. His RUE weakness and spasticity are clearly directly related to his double cervical pathology including demyelination and compressive myelopathy. I don't think it is necessary to look for additional causes. He has PPMS and his disease is expected to progress despite ocrevus. He may need IVIG replacement in the future if he develops serious infections. Although his favorable response to pulse steroid therapy is encouraging, we will have to be conservative with steroids given his increased risk of infection in the setting of ocrevus and iatrogenic hypogammaglobulinemia.     7/16/2024: Continues to struggle with worsening spasticity of the right hemibody including leaning to one side with truncal indentation on the left. No GI cause was found. His right hand continues to get weaker and his right arm and fingers continue to be semiflexed while walking. Had another EMG at [facility] that was reportedly normal. Still not decided on botox but is scheduled with me in August. Still experiencing neuropathic pain in the RUE. MRI stable. IGG still low but ALC normal. No infections. He is changing insurance in November and is worried about continuation of coverage    8/29/2024: Labs from July acceptable. Here for his first botox injection session. Tolerated well.     1/28/2025: His whole right side felt much weaker two weeks after botox then strength came back to baseline over the following few weeks. He has since continued to experience progressive RUE weakness and pain. Worsening paraethesia on the back. Worsening scoliosis. Sensitivity to cold. Worsening spasms and  clonus in the right arm and leg. Labs from January showed normal ALC and undetectable B cells but continues to show low and declining IGG. No serious infection just URTI symptoms. Will hold off on further botox. Will give him a trial of higher dose tizanidine. Will add cervical Mri to be done in March along with brain MRI to ensure no mechanical causes of worsening RUE weakness. Will refer him to ST given worsening dysphagia.     Plan:     - Acute relapse management: Not indicated. He had a good response to empiric steroids in 2023 so may consider this in the future if his symptoms get worse but we have to be conservative with this approach given his low IGG levels.       - Continue Ocrevus.      - Labs: will get labs before the next infusion.      - Will order MRI of the brain and cervical WWO to be done in July of 2025.      - Symptomatic management: continue same doses of baclofen to 40 mg BID. increase tizanidine to 4 mg twice daily. adjustgabapenitn to 600 mg BID. Stop botox.      - Vitamin D: continue 5000 units daily.      - Smoking: non smoker      - PT/OT: ordered along with ST.      - Instructions: keep an active life style and develop exercise routine as tolerated. Recommend a balanced healthy diet. Avoid excessive amounts of salt, carbs, fat, and red meat. Increase intake of fruits, vegetables, and white meat.      - Follow up: after MRI in July.      - The impression and plan were discussed with the patient and their family (if present) in detail and all questions answered. They agreed to the plan as outlined above.            Vern Patel MD, PhD   of Neurology  [employer]  Director of Neuroimmunology and staff neurologist at the [facility]  [facility]        Physical Exam

## 2025-01-28 NOTE — PATIENT INSTRUCTIONS
I'm sorry you could not tolerate botox. We will hold off on further botox at this point.     I will add MRI cervical spine to be done with the brain MRI in July to ensure there is no mechanical cause for your continued right arm weakness.     I will increase tizanidine to 4 mg twice daily as a trial to see if it will help your spasms and stiffness.     Your blood work showed low Igg, please report any serious infections if they happen in the future.     The rest of the blood work is good.     Please continue vitamin D and baclofen same dose.     I will refer you to PT/OT/ST.     I will refer you spine surgery    Follow up in July after MRI.     Thank you for visiting the clinic today    Vern Patel MD

## 2025-01-30 ENCOUNTER — APPOINTMENT (OUTPATIENT)
Dept: NEUROLOGY | Facility: HOSPITAL | Age: 60
End: 2025-01-30
Payer: COMMERCIAL

## 2025-01-31 DIAGNOSIS — G35 MULTIPLE SCLEROSIS (MULTI): Primary | ICD-10-CM

## 2025-02-03 ENCOUNTER — APPOINTMENT (OUTPATIENT)
Dept: PHYSICAL THERAPY | Facility: CLINIC | Age: 60
End: 2025-02-03
Payer: COMMERCIAL

## 2025-02-03 DIAGNOSIS — R26.9 ABNORMALITY OF GAIT: Primary | ICD-10-CM

## 2025-02-03 DIAGNOSIS — R26.89 BALANCE PROBLEM: ICD-10-CM

## 2025-02-03 DIAGNOSIS — G35 MULTIPLE SCLEROSIS (MULTI): ICD-10-CM

## 2025-02-03 DIAGNOSIS — M54.2 NECK PAIN: ICD-10-CM

## 2025-02-03 DIAGNOSIS — R29.898 DECREASED ROM OF NECK: ICD-10-CM

## 2025-02-03 PROCEDURE — 97163 PT EVAL HIGH COMPLEX 45 MIN: CPT | Mod: GP

## 2025-02-03 ASSESSMENT — ENCOUNTER SYMPTOMS
LOSS OF SENSATION IN FEET: 1
DEPRESSION: 0
OCCASIONAL FEELINGS OF UNSTEADINESS: 1

## 2025-02-03 NOTE — PROGRESS NOTES
Physical Therapy Re-Evaluation:  Neuro    Patient Name:  Marshall Padilla   MRN:  [id number]   Date of Evaluation:  02/03/25   Time Calculation  Start Time: 0902  Visit Number:  1  Insurance Information:  2025 0 COPAY, 50% COINS, 8300 DED, 9200 OOP MAX, 20 VS, NO AUTH REQ //    1. Abnormality of gait        2. Balance problem        3. Decreased ROM of neck        4. Neck pain        5. Multiple sclerosis (Multi)  PT eval and treat          Assessment: Marshall Padilla is a 59 year old male referred to outpatient PT for neck, R upper extremity, R lower extremity, and gait dysfunction due to process within the patient that is being investigated and currently thought to be MS.  It is apparent still that he's showing weakness in his R LE especially with respect to hip abduction, ER, flexion, knee flexion, and ankle DF.  This has a direct impact on his mobility of swing and stance during gait and stair navigation.  Discussed importance of getting back into cycling and hip strengthening.  Based on patient's examination, concurrent co-morbidities, and presentation, patient's level of complexity is High.  As a result, recommending continued PT services to facilitate improvement in CLOF.    Problems include:  Activity limitations, Aerobic capacity / endurance, Balance, Coordination, Decreased functional level, Decreased knowledge of HEP, Fall risk, Gait / locomotion, Motor function / tone , Pain, Participation restrictions, Posture, ROM, Sensory, Strength, and Transfers    Goals:  By Discharge patient will demo:    Improvement in gait mechanics without accessory / compensatory swing strategies for advancement of R LE.    Reciprocal stair navigation without vaulting of L LE    No falls during POC.     Improvements in strength of R hip, knee and ankle flexion by half a grade.     Subjective reports of improvements in posture.    ABC > 55%    Other goals to be updated throughout POC.    Potential to achieve rehab goals is  "fair.    Plan:  1-2 times every week for a total of 10 visits.  Re-assessment after that time.    Activities that may be performed include but are not limited to:  balance, gait, transfers, modalities (as appropriate), e-stim (as appropriate), canalith repositioning maneuvers, therapeutic exercise, therapeutic activities.    Marshall Padilla in agreement with and understanding of all discussed this date.    ----------------------------------  ----------------------------------    Subjective:    HPI:  Marshall Padilla is a 59 year old male, known to this therapist, returning to outpatient PT neck, R upper extremity, R lower extremity, and gait dysfunction due to MS.  Since was last here (10/19/24), reporting significant fatigue, especially surrounding taking care of puppy.    Saw Dr. Patel a couple weeks ago.      Knee was good until the last couple of weeks.  Been trying to bike and use the treadmill at home.  Has 5k planned in [place] in 2 weeks in which will be walking.    R arm has been \"difficult to work with.  Any time I try to do stuff it just fatigues me.\"  Folding laundry is very difficult and it \"fatigues and just dies.\"      Changed up meds for Tizanidine and gabapentin.  Tired from this.    Neck:  \"When I do stuff, it hurts.\"  Going to start going to [facility] because of difficulty with swallowing.  Will be getting an MRI in 4 months to check on neck.    R foot is feeling like it's getting numb.    Patient Goal:  See what can be improved.    Precautions: low fall risk    Pain:  R arm varying in severity based on activity and participation    Steadi Fall Risk  One or more falls in the last year? No  How many Times?    Was the patient injured in the fall?    Has trouble stepping onto curb? Yes  Advised to use a cane or walker to get around safely? No  Often has to rush to toilet? No  Feels unsteady when walking? Yes  Has lost some feeling in feet? Yes  Often feels sad or depressed? No  Steadies self on furniture while " walking at home? No  Takes medicine that makes them feel lightheaded or more tired than usual? No  Worried about Falling? No  Takes medicine to sleep or improve mood? No  Needs to push with hands when rising from a chair? No    ----------------------------------  ----------------------------------    OBJECTIVE    Observation: Observed muscle wasting of R UE and LE as compared to L.  Relative trunk lateral shift noted in sitting and standing.    ROM:  c-spine ROM limited d/t previous fusion in all directions.  B LE WFL all directions    Tone:  hypertonicity noted in R quads.  No signs of clonus in R LE.  Will defer to OT eval for R UE.    Strength:     LE Strength Screening:   RLE LLE   Hip Flex 3-/5, fatigues quickly 5/5   Hip ABD 3+/5 sidelying 5/5   Hip ER 4-/5 5/5   Knee Ext 4+/5 5/5   Knee Flex 4-/5 5/5   Ankle DF 2/5 5/5   Ankle PF 3+/5 5/5      Coordination:   Grossly impaired with R LE as compared to L as noted throughout functional movements and mobility    Sensation:  R LE no gross abnormalities noted in sensation    Functional Mobility Assessment:  - Gait:  increased movement laterally at hips, limited foot clearance of R LE with swing resulting in increased vaulting of L LE and accessory hip movements, circumduction of R LE, limited heel strike R foot and relative inversion, relative hyperextension of R knee  - Transfers:  no gross abnormalities noted  - Other:  Stairs:  Use of L UE for steadying, decreased control of placement of R LE with descent, at times R toe catching on step with ascent, vaulting with L LE    Outcomes:  ABC:  775/16 = 48% confidence    ----------------------------------  ----------------------------------    Encouraged to get back into cycling and wearing neoprene sleeve.  Encouraged to do clamshells and hip abductions a few days per week.

## 2025-02-07 ENCOUNTER — TELEPHONE (OUTPATIENT)
Dept: NEUROLOGY | Facility: HOSPITAL | Age: 60
End: 2025-02-07

## 2025-02-13 ENCOUNTER — TREATMENT (OUTPATIENT)
Dept: PHYSICAL THERAPY | Facility: CLINIC | Age: 60
End: 2025-02-13
Payer: COMMERCIAL

## 2025-02-13 ENCOUNTER — APPOINTMENT (OUTPATIENT)
Dept: NEUROLOGY | Facility: CLINIC | Age: 60
End: 2025-02-13
Payer: COMMERCIAL

## 2025-02-13 DIAGNOSIS — R29.898 DECREASED ROM OF NECK: ICD-10-CM

## 2025-02-13 DIAGNOSIS — M54.2 NECK PAIN: ICD-10-CM

## 2025-02-13 DIAGNOSIS — R26.89 BALANCE PROBLEM: ICD-10-CM

## 2025-02-13 DIAGNOSIS — R26.9 ABNORMALITY OF GAIT: Primary | ICD-10-CM

## 2025-02-13 PROCEDURE — 97110 THERAPEUTIC EXERCISES: CPT | Mod: GP

## 2025-02-13 NOTE — PROGRESS NOTES
"Physical Therapy Treatment      Patient Name: Marshall Padilla  MRN: [id number]  Today's Date: 2/13/2025  Visit #: 2  Insurance: 2025 0 COPAY, 50% COINS, 8300 DED, 9200 OOP MAX, 20 VS, NO AUTH REQ   Time Calculation  Start Time: 1403  Stop Time: 1443  Time Calculation (min): 40 min    1. Abnormality of gait        2. Balance problem        3. Decreased ROM of neck        4. Neck pain            ASSESSMENT:  Good participation.  Showing some persistent and continued weakness in R LE, focusing today on hamstrings and posterior chain.  No adverse events.  Will continued to benefit from PT to address impairments and improve CLOF and mobility.    GOALS:  By Discharge patient will demo:     Improvement in gait mechanics without accessory / compensatory swing strategies for advancement of R LE.     Reciprocal stair navigation without vaulting of L LE     No falls during POC.     Improvements in strength of R hip, knee and ankle flexion by half a grade.     Subjective reports of improvements in posture.     ABC > 55%    PLAN:  Continue focusing on NMR of R LE in swing and stance    SUBJECTIVE:  Did treadmill and inclines, with and without hands on.  \"Did pretty good and I was pretty happy with it.\"  Did that with the AFO.  Been working on some stretches.  Discussed with wife that he needs help with some stretching    OBJECTIVE:    TREATMENT:    Therapeutic Exercise (83570): 40 minutes    Cybex R Leg Curls -- 45lbs, 2 sets x 8, 1 sets x 10; 15lbs (increased) until fatigue with R LE only, focusing on eccentric -- performing to encourage recruitment of R HS to carryover into other activities.     White Cable Stack Hip Extensions -- x 10 with 2.5lbs; x 8, 3 sets x 10, 5lbs  -- cues for squeezing glutes on R side. Cues for anterior lean and greater ROM     Step-Ups on 16\" Riser -- 4 sets x 10 -- performing to encourage glute and hamstring activation  "

## 2025-02-18 ENCOUNTER — EVALUATION (OUTPATIENT)
Dept: OCCUPATIONAL THERAPY | Facility: CLINIC | Age: 60
End: 2025-02-18
Payer: COMMERCIAL

## 2025-02-18 DIAGNOSIS — G35 MULTIPLE SCLEROSIS (MULTI): Primary | ICD-10-CM

## 2025-02-18 PROCEDURE — 97535 SELF CARE MNGMENT TRAINING: CPT | Mod: GO

## 2025-02-18 PROCEDURE — 97166 OT EVAL MOD COMPLEX 45 MIN: CPT | Mod: GO

## 2025-02-18 ASSESSMENT — ENCOUNTER SYMPTOMS
OCCASIONAL FEELINGS OF UNSTEADINESS: 1
LOSS OF SENSATION IN FEET: 0
DEPRESSION: 0

## 2025-02-18 ASSESSMENT — PAIN - FUNCTIONAL ASSESSMENT: PAIN_FUNCTIONAL_ASSESSMENT: 0-10

## 2025-02-18 ASSESSMENT — PAIN SCALES - GENERAL: PAINLEVEL_OUTOF10: 0 - NO PAIN

## 2025-02-18 ASSESSMENT — ACTIVITIES OF DAILY LIVING (ADL): HOME_MANAGEMENT_TIME_ENTRY: 15

## 2025-02-18 NOTE — PROGRESS NOTES
Occupational Therapy    Occupational Therapy Evaluation    Name: Marshall Padilla  MRN: [id number]  : 1965  Date: 25    Time Entry:  Time Calculation  Start Time: 1023  Stop Time: 1130  Time Calculation (min): 67 min  OT Evaluation Time Entry  OT Evaluation (Moderate) Time Entry: 52     OT Therapeutic Procedures Time Entry  Self Care/Home Management (ADLs) Time Entry: 15    Visit #:     [facility]  [facility]  Authorization required: No  # of visits approved: 2025: $0 COPAY, 50% COINS, $8300 DED, $9200 OOP MAX  20 VS, NO AUTH REQ     General:  OT Received On: 25  General  Reason for Referral: OT Eval and Treat  Referred By: Kavon Baez MD    Assessment:  Patient is a 58 yo M who presents to outpatient Occupational Therapy with reports of dec functional use of RUE, progressive weakness and pain in the context of Multiple Sclerosis. Pt exhibits dec RUE AROM most significant at shoulder, elbow, and wrist (forearm and digits WFL), dec RUE strength, dec RUE coordination, and mildly dec concentration/attention. These impairments are limiting pt's safety and independence performing ADLs and IADLs; he is able to complete ADLs independently with inc time and effort, but requires assistance now with IADLs such as laundry and heavy cleaning. Standardized assessments administered today reveal that the patient has multiple impairments in body structures and functions, activity limitations, and participation restrictions. The patient also has personal factors and comorbidities that may serve as barriers affecting the plan of care, including length of time since onset, progressive neurodegenerative condition, and inaccessible home enviornment. Pt exhibits several supportive factors that contribute to rehab potential, including supportive family and strong motivation. The impairments and functional limitations outlined above warrant skilled OT services to facilitate measurable change in the  "above outcome measures and improve patient's safe participation in valued daily activities. Pt verbalized understanding and is in agreement with goals and plan of care.     Patient's clinical presentation includes evolving characteristics as noted during today's evaluation; therefore is of moderate complexity.      Plan:  Outpatient Plan  OT Plan: Skilled OT  Frequency: 1x/week or every other week (per pt's request d/t limited allowed visits)  Duration: 10 weeks  Onset Date: 02/18/25  Rehab Potential: Fair  Plan of Care Agreement: Patient    Next Visit:  - QuickDash  - Re-check RUE MMT  - RUE pinch strength  - 9HPT  - Box and Blocks  - Further sensation testing: temperature and sharp vs dull    Current Problem:  1. Multiple sclerosis (Multi)  Follow Up In Occupational Therapy        Subjective:  Patient presents to the clinic ambulatory and independent, w/ h/o PPMS. Pt reports his MS was suspected in 2017, and confirmed in 2018 with symptoms beginning in distal RUE. Pt reports he had disc herniation in 2017 s/p C4-5 and C5-6 anterior fusion sx that improved all RUE symptoms he was having (RUE pain and some difficulty functional use hand). Pt reports in May 2018, he experienced sudden onset of difficulty walking and foot drop, so he started outpatient PT. Pt has several-year hx working with PTLuis A, and has also worked with OTWang, in the past primarily addressing fine motor skills. Pt reports R arm pain starting at shoulder and shooting down; he has tried dry needling and elbow nerve glides in therapy in the past, but has still declined, with a lot of RUE muscle atrophy. Pt reports he had a posterior cervical fusion sx in 2022 at 3 levels. He has also tried botox for RUE \"with bad effects.\"  Pt reports his current OT eval was ordered d/t RUE worsening function starting in 2024, and his MS doctor recommended he return to OT (Vern Patel, neurology).  Pt has R AFO (since Oct 2024), wears only for longer walking " distances outside the home. Pt also has RUE splint to extend fingers/wrist and usually wears it daily in the morning while drinking coffee 30-60 minutes (pt purchased). Pt has exercise bike, but hasn't rode recently d/t inc knee sensitivity and pain. Pt has home gym setup in basement with multiple exercise machines and 10# dumbbells.  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 His wife is also retired from [employer] as a . Pt reports he travels to [place] multiple times/year; is going soon with his wife. Pt has 2 sons (both live at home; both work), 19 and 23-yo. Black lab mix 6 months old.  Pt reports he participated in mental health counseling last year with [facility], but not currently in active counseling. Pt inquired about MS support group in January and was directed to [employer].  Pt reports newest symptom is difficulty with swallowing > got speech eval but has not scheduled. Has done endoscopies and gastric emptying tests d/t abdominal pain    Per OP Neurology note (Vern Patel MD; 1/28/25):  This is a 59 year old right handed White male patient who presented initially with right arm pain in the setting of significant C5-6 disc prolapse with radiculomyelopathy and an incidental finding of an unrelated demyelinating -like plaque at the CMJ. brain MRI showed only non specific deep white matter hyperintense lesions. He underwent a successful cervical decompression surgery with Dr. Willis in October 2017 and his RUE improved. LP was negative for MS markers but he started having progressive RLE weakness in March 2018. Repeat imaging showed a new enhancing lesion at T4 on the right side. A diagnosis of PPMS with activity and progression was made.   INTERVAL HISTORY: His whole right side felt much weaker two weeks after botox then strength came back to baseline over the following few weeks. He has since continued to experience progressive RUE  weakness and pain. Worsening paraethesia on the back. Worsening scoliosis. Sensitivity to cold. Worsening spasms and clonus in the right arm and leg. Labs from January showed normal ALC and undetectable B cells but continues to show low and declining IGG. No serious infection just URTI symptoms. Z  Plan:  Acute relapse management: Not indicated. He had a good response to empiric steroids in 2023 so may consider this in the future if his symptoms get worse but we have to be conservative with this approach given his low IGG levels.  Continue Ocrevus.  Labs: will get labs before the next infusion.  Will order MRI of the brain and cervical WWO to be done in July of 2025.  Symptomatic management: continue same doses of baclofen to 40 mg BID. increase tizanidine to 4 mg twice daily. adjustgabapenitn to 600 mg BID. Stop botox.   Vitamin D: continue 5000 units daily.  Smoking: non smoker   PT/OT: ordered along with ST.  Instructions: keep an active life style and develop exercise routine as tolerated. Recommend a balanced healthy diet. Avoid excessive amounts of salt, carbs, fat, and red meat. Increase intake of fruits, vegetables, and white meat.  Follow up: after MRI in July.     Current Functional Limitations: dec functional use RUE, dec R FMC and motor control, inc pain and spasticity RUE causing dec IADL and ADL independence, mild difficulty driving    Patient-Stated Goals: ease the R arm pain, use the arm more, reduce fatigue, get stronger, R writing, better ROM/strength/movement RUE    Relevant PMH: PPMS incidentally found 6/2017 and received radiological dx 10/19/2017, then clinical diagnosis on 5/15/2018, on Ocrevus since 6/2018. History of Lower Back Surgery History of Posterior cervical vertebral fusion (7/11/22)     Med Hx Screening form: Spinal cord injury, MS, constant or severe pain in R calf/lower leg, problems with swallowing/changes in speech, problems with balance, problems with coordination; Scoliosis dx  "in last couple years with [facility]     Precautions:  Precautions  STEADI Fall Risk Score (The score of 4 or more indicates an increased risk of falling): 2  Precautions Comment: Low fall risk (no fall hx)  Steadi Fall Risk  One or more falls in the last year? No  How many Times?    Was the patient injured in the fall?    Has trouble stepping onto curb? No  Advised to use a cane or walker to get around safely? No  Comment:has walking stick  Often has to rush to toilet? No  Feels unsteady when walking? Yes  Comment:at times, depends on activity  Has lost some feeling in feet? No  Comment:R foot purple and cold at times, reduced sensation  Often feels sad or depressed? No  Steadies self on furniture while walking at home? No  Takes medicine that makes them feel lightheaded or more tired than usual? Yes  Comment:Anti-spasticity meds  Worried about Falling? No  Comment:Not worried, but \"conscious\"  Takes medicine to sleep or improve mood? No  Needs to push with hands when rising from a chair? No    Pain Assessment:  Pain Assessment  Pain Assessment: 0-10  0-10 (Numeric) Pain Score: 0 - No pain    Home Living:  Home Living  Home Living Comments: Lives in colonial style house 2 floors with basement, 2STE -rail, master bed/full bath on second floor, railings on both staircases    Prior Function Per Pt/Caregiver Report:  Prior Function Per Pt/Caregiver Report  Prior Function Comments: MS symptom onset in early 2018, progressive functional decline since then, with recent acute worsening and decline in ADL/IADL status. Prior to 2018, pt ind all ADLs/IADLs +driving    ADL/IADL currently:  ADL Comments: Uses L hand primarily for all UB self-care, with pt stating, \"it's become more automatic over the last year to use my L hand for everything.\" Mod Ind ADLs (inc time and adaptation), IADLs with mod Ind and some assistance (difficult to fold clothes), able to vacuum, able to get down on hands and knees washing floors with " difficulty. Pt ambulates ind without device (does not own any devices besides walking stick). Pt +drives, but some minor concern regarding reaction time and motor control switching RUE between gas and brake, but has not limited driving.    Objective   Vision:  Prescription bifocals, established with eye doctor    Hearing:  WFL    Cognition:  Cognition  Overall Cognitive Status: Within Functional Limits (Pt reports higher distractibility, inc difficulty with attention and concentration)    Functional Movements/Balance:   -Bed Mobility: Sleeps in regular bed; Ind  -Sit <> stand: Ind  -Transfers: Ind  -Functional Mobility: mod Ind  -Sitting Balance: Norm  -Standing Balance: Good    Range of Motion:   R-handed  AROM  LUE WNL  RUE:  Shld Flex 150, Abd 110  Elbow flex full range, ext 170  Forearm sup/pron full range  Wrist flex 45, ext 65   Digits full range with effort/inc time  Thumb to DPC full range    Strength:  Upper Extremity   LUE WNL  RUE grossly impaired (distal worse than proximal)    R  64 #  L  118#    Motor Control:  LUE WFL  RUE poor d/t spasticity    Sensation:   Dec touch sensation with numbness in RUE (hand) and RLE (foot)  Touch localization WFL BUEs    Perception:  [facility]    Outcome Measures:  ABC: 46.9%  (48% on 2/3/25 at PT eval)  QuickDash to be completed next tx session.    Treatment:   - Self-care/ADL: discussion of home task adaptations and modifications  - Education, see section below.    HEP:  Fine-motor HEP to be established.   Pt reports he currently performs: Lat pull downs, Dowel rusty ex, Stretch shoulders in doorway    Outpatient Education:   Discussed the following points:  1.) Rationale behind pathophysiology, OT diagnosis/prognosis, and OT POC.  2.) Modifiable and non-modifiable risk factors for neurodegenerative disease progression.   3.) Importance of life modifications/task adaptation in the context of promoting independence in daily activity.    Goals:  By discharge,     Pt will  demo independence with HEP completion.  Pt will demo and verbalize use of energy conservation/joint protection techniques to increase activity tolerance while completing ADL/IADL tasks.   Pt will increase R  strength by 10# to increase independence with ADLs/IADLs.  Pt will demo increased functional use of RUE, as indicated by the QuickDash score.  Pt will demo increased  R FMC by decreasing time on 9 Hole Peg test by 5 seconds, to maximize independence with ADLs/IADLs.  Pt will demo improved RUE motor control and dec spasticity with use of compensatory strategies during daily activities.  Pt will demo self-care/ADL/IADL compensation/adaptation use to promote functional independence.

## 2025-03-03 ENCOUNTER — TREATMENT (OUTPATIENT)
Dept: PHYSICAL THERAPY | Facility: CLINIC | Age: 60
End: 2025-03-03
Payer: COMMERCIAL

## 2025-03-03 DIAGNOSIS — R29.898 DECREASED ROM OF NECK: ICD-10-CM

## 2025-03-03 DIAGNOSIS — R26.89 BALANCE PROBLEM: ICD-10-CM

## 2025-03-03 DIAGNOSIS — R26.9 ABNORMALITY OF GAIT: Primary | ICD-10-CM

## 2025-03-03 DIAGNOSIS — M54.2 NECK PAIN: ICD-10-CM

## 2025-03-03 PROCEDURE — 97110 THERAPEUTIC EXERCISES: CPT | Mod: GP

## 2025-03-03 NOTE — PROGRESS NOTES
Physical Therapy Treatment      Patient Name: Marshall Padilla  MRN: [id number]  Today's Date: 3/3/2025  Visit #: 3  Insurance: 2025 0 COPAY, 50% COINS, 8300 DED, 9200 OOP MAX, 20 VS, NO AUTH REQ   Time Calculation  Start Time: 1003  Stop Time: 1044  Time Calculation (min): 41 min    1. Abnormality of gait        2. Balance problem        3. Decreased ROM of neck        4. Neck pain            ASSESSMENT:  Good participation.  Showing some persistent and continued weakness in R LE, focusing again today on hamstrings and posterior chain.  Discussed continued importance of this.  No adverse events.  Will continued to benefit from PT to address impairments and improve CLOF and mobility.    GOALS:  By Discharge patient will demo:     Improvement in gait mechanics without accessory / compensatory swing strategies for advancement of R LE.     Reciprocal stair navigation without vaulting of L LE     No falls during POC.     Improvements in strength of R hip, knee and ankle flexion by half a grade.     Subjective reports of improvements in posture.     ABC > 55%    PLAN:  Continue focusing on NMR of R LE in swing and stance    SUBJECTIVE:  Was sore after the last session, and took a couple days to get back to it.  Did the 5k in about 57 minutes.  Was really sore yesterday for some reason and wasn't sure.    OBJECTIVE:    TREATMENT:    Therapeutic Exercise ([id number]): 41 minutes    Cybex R Leg Curls -- 50lbs, 3 sets x 10; 20lbs (increased) until fatigue (40 reps) with R LE only, focusing on eccentric -- performing to encourage recruitment of R HS to carryover into other activities.     White Cable Stack Hip Extensions -- 3 sets x 10, 5lbs; x 10 with 7.5lbs; x 10 with 10lbs  -- cues for squeezing glutes on R side. Cues for anterior lean and greater ROM     Demonstrated ace bandage DF wrap for nighttime / sleep.  Receptive to discussion and its purpose.  Discussed R sided weakness and importance of performing minutiae of activities.

## 2025-03-12 ENCOUNTER — TREATMENT (OUTPATIENT)
Dept: PHYSICAL THERAPY | Facility: CLINIC | Age: 60
End: 2025-03-12
Payer: COMMERCIAL

## 2025-03-12 ENCOUNTER — APPOINTMENT (OUTPATIENT)
Dept: OCCUPATIONAL THERAPY | Facility: CLINIC | Age: 60
End: 2025-03-12
Payer: COMMERCIAL

## 2025-03-12 DIAGNOSIS — R26.9 ABNORMALITY OF GAIT: Primary | ICD-10-CM

## 2025-03-12 DIAGNOSIS — M54.2 NECK PAIN: ICD-10-CM

## 2025-03-12 DIAGNOSIS — R26.89 BALANCE PROBLEM: ICD-10-CM

## 2025-03-12 DIAGNOSIS — R29.898 DECREASED ROM OF NECK: ICD-10-CM

## 2025-03-12 PROCEDURE — 97110 THERAPEUTIC EXERCISES: CPT | Mod: GP

## 2025-03-12 NOTE — PROGRESS NOTES
Physical Therapy Treatment      Patient Name: Marshall Padilla  MRN: [id number]  Today's Date: 3/12/2025  Visit #: 4  Insurance: 2025 0 COPAY, 50% COINS, 8300 DED, 9200 OOP MAX, 20 VS, NO AUTH REQ   Time Calculation  Start Time: 1034  Stop Time: 1116  Time Calculation (min): 42 min    1. Abnormality of gait        2. Balance problem        3. Decreased ROM of neck        4. Neck pain            ASSESSMENT:  Updated HEP to include banded sidestepping instead of laying hip abduction.  No complaints with that this date.  Reviewed the bandaging again at home to help with the achilles shortening.  Will continued to benefit from PT to address impairments and improve CLOF and mobility.    GOALS:  By Discharge patient will demo:     Improvement in gait mechanics without accessory / compensatory swing strategies for advancement of R LE.     Reciprocal stair navigation without vaulting of L LE     No falls during POC.     Improvements in strength of R hip, knee and ankle flexion by half a grade.     Subjective reports of improvements in posture.     ABC > 55%    PLAN:  Continue focusing on NMR of R LE in swing and stance    SUBJECTIVE:  Had roughest week ever.  After doing side hip abductions had severe R sided discomfort.  Feeling better at this point where can actually move around.    OBJECTIVE:    TREATMENT:    Therapeutic Exercise ([id number]): 42 minutes    Cybex R Leg Curls -- x 12, 30lbs;  3 sets x 10, 40lbs; 20lbs (increased) until fatigue (40 reps) with R LE only, focusing on eccentric -- performing to encourage recruitment of R HS to carryover into other activities.     Attempted standing banded hip abduction, but unable to complete appropriately without lateral lean and hip hiking.  Discussed that likely the discomfort he felt was secondary to cocontraction and straining.    Sidestepping with yellow band -- 3 sets of 8ft x 3 each direction    Demonstrated ace bandage DF wrap for nighttime / sleep again this session.

## 2025-03-19 ENCOUNTER — EVALUATION (OUTPATIENT)
Dept: SPEECH THERAPY | Facility: CLINIC | Age: 60
End: 2025-03-19
Payer: COMMERCIAL

## 2025-03-19 DIAGNOSIS — G35 MULTIPLE SCLEROSIS (MULTI): ICD-10-CM

## 2025-03-19 DIAGNOSIS — R13.10 DYSPHAGIA: Primary | ICD-10-CM

## 2025-03-19 PROCEDURE — 92610 EVALUATE SWALLOWING FUNCTION: CPT | Mod: GN | Performed by: SPEECH-LANGUAGE PATHOLOGIST

## 2025-03-19 ASSESSMENT — PAIN SCALES - GENERAL: PAINLEVEL_OUTOF10: 0 - NO PAIN

## 2025-03-19 ASSESSMENT — PAIN - FUNCTIONAL ASSESSMENT: PAIN_FUNCTIONAL_ASSESSMENT: 0-10

## 2025-03-19 NOTE — PROGRESS NOTES
Speech-Language Pathology    Outpatient Speech-Language Pathology Clinical Swallow Evaluation     Patient Name: Marshall Padilla  MRN: [id number]  : 1965  Today's Date: 25  Time Calculation  Start Time: 1045  Stop Time: 1145  Time Calculation (min): 60 min     Current Problems:  Dysphagia  Multiple sclerosis    Recommendations:  regular/easy to chew; IDDSI level 7 and liquid via spoon  small single bites, small single sips, liquid wash, effortful swallow, alternate solids and liquids, and slow rate    SLP Assessment:  Marshall Padilla currently displays oral and suspected pharyngeal dysphagia .  This is characterized by reports of intermittent choking episodes along with reports of globus sensations and severe odynophagia during oral intake. Patient reports noticing a gradual decline in swallow abilities since last year, though they have significantly worsened since January. Patient reports avoiding food at one point due to odynophagia. Patient since reports finding and utilizing compensatory strategies such as extra moistening agents, thorough mastication efforts, alternating solids and liquids, and eating/drinking at a slow pace. Patient reports consistently being the last one to finish a meal. He reports left side abdominal pain as well as a history of a hernia, which could be impacting swallow abilities. Patient with a very complex and detailed medical history- see below for details from Dr. Patel's note. Of note, patient reports spinal fusions at C4-C7 and C5-C6 as well as a history of lesions in the throat. Patient did not demonstrate aspiration/penetration on any trialed consistencies this date, though did report globus sensations and odynophagia. Patient reports minimal difficulties with pills- he reports 2-3 swallow attempts before oral clearance is achieved, though reports this is near baseline. Patient reports difficulty with tough and crunchy foods. He has a history of MS, muscle spasms, neck pain,  "and myelomalacia. Plan for patient to participate in a modified barium swallow study to further evaluate swallow abilities and develop appropriate plan of care pending findings.     Pt completed the EAT-10 this date and scored as follows:  EAT 10  My swallowing problem has caused me to lose weight.: 3  My swallowing problem interferes with my ability to go out for meals.: 0  Swallowing liquids takes extra effort.: 1  Swallowing solids takes extra effort.: 3  Swallowing pills takes extra effort.: 2  Swallowing is painful: 2  The pleasure of eating is affected by my swallowing.: 0  When I swallow food sticks in my throat.: 1  I cough when I eat.: 0  Swallowing is stressful: 1  EAT-10 TOTAL SCORE:: 13  A score of 3 or higher indicates that there may be a swallowing dysfunction.  The higher the number, the more it is affecting the pt.     Skilled Speech Therapy is medically necessary and ordered by a physician to help Marshall Padilla learn and utilize strategies and exercises to reduce the risk of aspiration and further prevent pneumonia.  This will also further allow the Marshall [facility] [facility] to maintain good levels of PO intake for overall improved nutrition as well as physical and social-emotional health.     Related Medical History:  Per Dr. Patel's note 1/28/2025:     \"Assessment:  This is a 51 year old White right handed male with PMH of lumbar disc disease and a family hx of MS who presented initially in August 2017 with right arm pain and subjective weakness. The neurological exam was positive for generalized pathological hyperreflexia. I have personally reviewed the brain MRIs from 6/2017 which showed a few non-enhancing T2 hyperintense lesions mainly in the deep white matter without any true periventricular, juxtacortical, or infratentorial lesions. I personally reviewed his cervical MRI which showed C5-6 disc herniation with cord compression and myelomalecia. In addition, it showed a short non-enhancing demyelinating-like " lesion at the cervicomedullary junction. CSF was normal including negative OCB and IGG index. VEP was normal. MS mimics ruled out including NMO. He underwent a successful cervical decompression surgery in 10/2017 with subsequent improvement in RUE symptoms. He was initially thought to have an incidental asymptomatic small area of demyelination in the upper cervical cord without evidence of dissemination in space or time and no markers of recurrence or disease progression. He was therefore deemed appropriate for monitoring off DMT.     05/14/2018: started having progressive RLE weakness, heaviness, and partial foot drop. Exam revealed new RLE weakness and mild spasticity.   05/15/2018 I personally reviewed his new MRI and compared them to the MRIs from 2017. MRI brain did not show active or new lesions compared to 2017. MRI cervical showed improved cord compression at the site of surgery and stable upper CMJ lesion (to my eyes looks somewhat bigger). thoracic MRI showed a new enhancing lesion at T4 on the right side. This confirms PPMS with activity. Patient is indicated for steroid therapy and Ocrevus.      11/20/2018: new transient left facial numbness and twitching but MRI negative for any new or active lesions in the brain or spinal cord. Likely pseudorelapse from a previous unnoticed relapse. tolerating acrevus well. baclofen helpful.      06/12/2019: MRI stable, worsening fatigue and spasms. Will start amantadine and increase baclofen.      12/03/2019: Some worsening of pre-existing symptoms in the RUE and RLE but exam stable or even improved compared to before. cervical XRAY showed some osseous bridging at the surgical site and Dr. Willis is considering a revision. The new symptoms could be expected fluctuation of MS symptoms or recurrent cord compression at the previous surgical site.      06/09/2020: He feels that his right leg is getting weaker but his exam and 25-f-w are actually stable to improved  compared to prior exams. He works as a  and is frequently exposed to COVID-19 patients but with full PPE. His brain and cervical MRIs from May did not show any changes compared to the prior MRI from last year. Will get Ig and CD19 levels. I advised against exposure to COVID-19 patients while on ocrevus.      12/09/2020: He feels that his right leg is still getting weaker but exam is stable to improved.. he tried a bioness but felt that he's not ready for it yet and the therapists agreed as well. His labs from June showed only slight lymphopenia and minor reduction in Igg. he saw Dr. Willis who had no further recs regarding his cervical myelopathy. Ocrevus is going well. Baclofen is helpful at the higher dose.     06/03/2021: He feels that his right arm and leg are still getting weaker and number. His right arm is also painful. It is hard for him now to use the right hand for cooking, playing basketball, and even driving. Exam remaisn stable with excellent walking speed. MRI brain and cervical stable. his latest labs still showed mild reduction of IgG and ALC. He took his first COVID vaccine in December and the second in January. He had received his ocrevus dose shortly before his first vaccine dose in December. will increase baclofen and gabapentin. will check labs including spike protein.      01/17/2022: continues with RUE pain and numbness and is getting a second opinion with another spine specialist. No change in his known RLE weakness and gait fatigue. He had sever left flank pain after the latest ocrevus infusion and went to the ER. No kidney stone was found. He became constipated later after he was given oxycodone for pain, now improving. He received COVID booster in October but still tested negative for the COVID19 spike antibody. Labs from [facility] still show mild reduction in ALC and IgG.      08/22/2022: worsening sensorimotor deficits over right C6/7 distribution prompted posterior  decompression for cervical myelopathy. Post-op improvement in RUE symptoms. Recently hit head -> post-traumatic BPPV. Seen in [facility] in ED, improving recently. No new MS symptoms and walking speed stable. MRI stable without new or active lesions. labs acceptable with only slight reduction in ALC and IGG. He pushed his next ocrevus to January for deductible purposes, which is fine as he never had early B cell repletion.      06/26/2023: He did not pass the latest physical for work and had to retire. been frustrated about that. right leg is getting stiffer and right arm getting harder to use despite the transient improvement last year after cervical repeat surgery. no infections. blood work with stable mild reduction in IgG and ALC. MRI done at an OSH due to insurance issues and the CD he brought in was empty. 25-f-w is stable but gait is more spsatic. will increase baclofen to 30 mg BID.      1/18/2024: After Dr. Willis left , he went to [facility] for evaluation of progressive RUE weakness and new dystonic flexion of the right fingers. He was seen by neurosurgery and had repeat spine MRIs in November that were stable. He hand an EMG done that showed no evidence of ulnar neuropathy or motor neuron disease. He saw Dr. Hopper at the [facility] who agreed with ocrevus treatment and referred him to the spasticity clinic where he was deemed unsuitable for botox for spasticity. He was treated empirically with high dose IV steroids followed by prednisone taper at the [facility] with subsequent improvement in foot lift and gait but without effect on his RUE. He then had an URTI that required antibiotic therapy. He had an xray that was negative for PNA but showed some abdominal hyperdensity that he is undergoing CT abdomen for soon. He is noticing a change in his trunk/abdomen shape and a leaning towards the right when walking. His last ocrevus labs from last week showed a decrease in IgG level but normal ALC. On exam he does have  new grade 1-2 spasticity in the right fingers with mild dynamic dystonic flexion of the fingers. I think he can be a good candidate for a low dose botox to the finger flexors (25 FDS and 25 FDP) with the caveat of potentially causing finger weakness. He wants to think about it and let me know. His RUE weakness and spasticity are clearly directly related to his double cervical pathology including demyelination and compressive myelopathy. I don't think it is necessary to look for additional causes. He has PPMS and his disease is expected to progress despite ocrevus. He may need IVIG replacement in the future if he develops serious infections. Although his favorable response to pulse steroid therapy is encouraging, we will have to be conservative with steroids given his increased risk of infection in the setting of ocrevus and iatrogenic hypogammaglobulinemia.      7/16/2024: Continues to struggle with worsening spasticity of the right hemibody including leaning to one side with truncal indentation on the left. No GI cause was found. His right hand continues to get weaker and his right arm and fingers continue to be semiflexed while walking. Had another EMG at [facility] that was reportedly normal. Still not decided on botox but is scheduled with me in August. Still experiencing neuropathic pain in the RUE. MRI stable. IGG still low but ALC normal. No infections. He is changing insurance in November and is worried about continuation of coverage     8/29/2024: Labs from July acceptable. Here for his first botox injection session. Tolerated well.      1/28/2025: His whole right side felt much weaker two weeks after botox then strength came back to baseline over the following few weeks. He has since continued to experience progressive RUE weakness and pain. Worsening paraethesia on the back. Worsening scoliosis. Sensitivity to cold. Worsening spasms and clonus in the right arm and leg. Labs from January showed normal ALC and  "undetectable B cells but continues to show low and declining IGG. No serious infection just URTI symptoms. Will hold off on further botox. Will give him a trial of higher dose tizanidine. Will add cervical Mri to be done in March along with brain MRI to ensure no mechanical causes of worsening RUE weakness. Will refer him to ST given worsening dysphagia.      Plan:     - Acute relapse management: Not indicated. He had a good response to empiric steroids in 2023 so may consider this in the future if his symptoms get worse but we have to be conservative with this approach given his low IGG levels.       - Continue Ocrevus.      - Labs: will get labs before the next infusion.      - Will order MRI of the brain and cervical WWO to be done in July of 2025.      - Symptomatic management: continue same doses of baclofen to 40 mg BID. increase tizanidine to 4 mg twice daily. adjustgabapenitn to 600 mg BID. Stop botox.      - Vitamin D: continue 5000 units daily.      - Smoking: non smoker      - PT/OT: ordered along with ST.      - Instructions: keep an active life style and develop exercise routine as tolerated. Recommend a balanced healthy diet. Avoid excessive amounts of salt, carbs, fat, and red meat. Increase intake of fruits, vegetables, and white meat.      - Follow up: after MRI in July. \"    SLP Plan:  Marshall Padilla is recommended to be seen for Skilled Speech Therapy 1 times per week for 10 weeks.    Discussed POC: Patient  Discussed Risks/Benefits: yes, with patient  Pt/Caregiver Agreeable: yes  Prognosis: Fair    Goals:  By discharge Marshall Padilla will:  LTGs:    Pt will tolerate least restrictive diet without overt s/s of aspiration at 100% independently.    Goal Established: 3/19/2025   Time Frame: 10 weeks   Status: Progressing    STGs:   Patient will participate in a modified barium swallow study to further evaluate his swallow mechanism and determine appropriate POC.   Goal Established: 3/19/2025   Time Frame: " 10 weeks   Status: Progressing     Pt/family education/support to be provided each session.    Goal Established: 3/19/2025   Time Frame: 10 weeks   Status: Progressing    Consider referral to: MBSS  Barriers:  multiple diagnoses  Strengths: cognition, communication, motivation, and family/caregiver support     There are no spiritual/cultural practices/values/needs that are important to know.   No overt symptoms/signs of abuse/neglect.   Pt/family prefer to learn via explanation/discussion    Subjective:   Marshall Padilla was seen 1-on-1.  Marshall Padilla participated well throughout the assessment this date.  Living Environment: home and with family  Patient Arrival: alone     Date of Onset: 1/28/2025     Chief Complaint: dysphagia secondary to MS     Previous Therapies: no    Baseline Observations:   Respiratory Status:  room air  Hearing: WFL  Vision: glasses  History of Intubation: no  Behavior/Cognition: alert, cooperative, and pleasant mood  Patient Positioning: upright in chair  Baseline Vocal Quality: WFL  Weight Loss: yes- 10 lbs. Over an 8 week period earlier this year  Dentition: adequate/natural    Insurance:  Reviewed: Yes  Number of Authorized Treatments : 20 VS PCY Hard Limit, No Auth Req'd,  Total Number of Visits:          Certification Period:   Ending:      Pain:  Pain Assessment: 0-10   Pain Score: 0-10 (Numeric) Pain Score: 0 - No pain    Objective:  Consistencies trialed this date: regular/easy to chew; IDDSI level 7, pureed; IDDSI level 4, thin; IDDSI level 0, and independently fed    Oral Motor:   Oral Motor: Within Functional Limits  Facial Symmetry: Within Functional Limits  Labial Agility: Within Functional Limits  Labial Deviation Left: Within Functional Limits  Labial Deviation Right: Within Functional Limits  Labial ROM: Within Functional Limits  Labial Strength: Within Functional Limits  Labial Symmetry: Within Functional Limits  Lingual Agility: Within Functional Limits  Lingual Deviation  Left: Within Functional Limits  Lingual Deviation Right: Within Functional Limits  Lingual Symmetry: Within Functional Limits  Palatal Elevation: Within Functional Limits  Breath Support: Adequate for speech  Oral Hygiene: clear, moist oral cavity    Laryngeal Assessment:   Patient consumed 4 oz of thin liquids via single cup sips without overt signs/symptoms of aspiration/penetration. Patient with complaints of abdominal pain on the left side post all oral intake. Patient with seemingly timely swallow onset and bolus manipulation. Patient consumed trials of solid food and puree without overt signs/symptoms of aspiration/penetration noted. Patient reports 2/10 pain level during solid trials and globus sensation during puree trials. No oral residuals post swallow, patient with independent liquid wash post solid intake. Adequate oral mechanism abilities noted.     Oral Phase:   Management of Oral Secretions: Adequate  Poor Management of Oral Secretions: No     Prolonged Oral Manipulation: Pureed/Extremity Thick (IDDSI Level 4), Regular (IDDSI Level 7), Thin (IDDSI Level 0) - Cup  Impaired Mastication:  (extended but efficient mastication)       Pharyngeal Phase:   Signs/Symptoms of Aspiration: Other (Comment) (globus sensation)     Other Signs/Symptoms of Difficulty with Feeding: Reported Globus Sensation, Oral Holding  Overt Signs or Symptoms of Aspiration:  (none)     Reported Globus Sensation: Pureed/Extremely Thick (IDDSI Level 4)     Treatment:   None this date    Outpatient Education:   Reviewed results of today's assessment.         Reviewed plan for Therapy and Marshall Padilla was in agreement of this.

## 2025-03-20 ENCOUNTER — TREATMENT (OUTPATIENT)
Dept: PHYSICAL THERAPY | Facility: CLINIC | Age: 60
End: 2025-03-20
Payer: COMMERCIAL

## 2025-03-20 ENCOUNTER — TREATMENT (OUTPATIENT)
Dept: OCCUPATIONAL THERAPY | Facility: CLINIC | Age: 60
End: 2025-03-20
Payer: COMMERCIAL

## 2025-03-20 DIAGNOSIS — R29.898 DECREASED ROM OF NECK: ICD-10-CM

## 2025-03-20 DIAGNOSIS — R26.89 BALANCE PROBLEM: ICD-10-CM

## 2025-03-20 DIAGNOSIS — R29.898 DECREASED GRIP STRENGTH OF RIGHT HAND: ICD-10-CM

## 2025-03-20 DIAGNOSIS — M54.2 NECK PAIN: ICD-10-CM

## 2025-03-20 DIAGNOSIS — M25.611 DECREASED RANGE OF MOTION OF RIGHT SHOULDER: ICD-10-CM

## 2025-03-20 DIAGNOSIS — R27.8 COORDINATION IMPAIRMENT: Primary | ICD-10-CM

## 2025-03-20 DIAGNOSIS — R26.9 ABNORMALITY OF GAIT: Primary | ICD-10-CM

## 2025-03-20 DIAGNOSIS — G35 MULTIPLE SCLEROSIS (MULTI): ICD-10-CM

## 2025-03-20 PROCEDURE — 97112 NEUROMUSCULAR REEDUCATION: CPT | Mod: GO

## 2025-03-20 PROCEDURE — 97110 THERAPEUTIC EXERCISES: CPT | Mod: GP

## 2025-03-20 PROCEDURE — 97530 THERAPEUTIC ACTIVITIES: CPT | Mod: GO

## 2025-03-20 NOTE — PROGRESS NOTES
Occupational Therapy  Occupational Therapy Treatment Note    Patient Name Marshall Padilla   MRN: [id number]  : 1965  Today's Date: 25  Time Calculation  Start Time: 1131  Stop Time: 1220  Time Calculation (min): 49 min    Visit #: 2    Insurance:  Medical [place] [employer]  Authorization required: No  # of visits approved: 2025: $0 COPAY, 50% COINS, $8300 DED, $9200 OOP MAX  20 VS, NO AUTH REQ     Therapy Diagnoses:   1. Coordination impairment        2. Decreased range of motion of right shoulder        3. Decreased  strength of right hand        4. Multiple sclerosis (Multi)          Assessment:  Patient demonstrated mildly impaired sharp vs dull and temperature sensation in BUEs this date. Pt also demonstrated decreased distal motor control of RUE with observed proximal compensatory movement patterns at R shoulder and elbow during fine motor task. Pt completed formal assessments for baseline establishment this date, and learned sensory re-education technique of applying deep touch pressure to bypass pain/light touch neural pathways. Pt continues to demonstrate decreased functional use of RUE and will benefit from further skilled OT to address these remaining functional, objective and subjective deficits and optimize ADL/IADL functioning.    Plan:      Continue to progress per POC: Continue OT 1x/week for 10 weeks, then re-assess.    Interventions: Neuromuscular reeducation, therapeutic activities, therapeutic exercise, self-care/ADL, manual therapy    Goals:  By discharge,      Pt will demo independence with HEP completion.  Pt will demo and verbalize use of energy conservation/joint protection techniques to increase activity tolerance while completing ADL/IADL tasks.   Pt will maintain R  strength +/- 10# to facilitate independence with ADLs/IADLs.  Pt will demo increased functional use of RUE, as indicated by the QuickDash score; current score is 37.  Pt will maintain R FMC by keeping time on 9  "Hole Peg test within +/- 5 seconds, to maximize independence with ADLs/IADLs.  Pt will demo improved RUE motor control and dec spasticity with use of compensatory strategies during daily activities.  Pt will demo self-care/ADL/IADL compensation/adaptation use to promote functional independence.     Subjective:   Patient reports the trip to [place] went well, speed walked a 5k, achieving faster time each consecutive mile.     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 Other hx: lower back sx, disc herniation s/p C4-5 and C5-6 anterior fusion (2017), posterior cervical fusion at 3 levels (7/11/22), pt has trialed botox for RUE (\"with bad effects\"). Pt has long history of OP Rehab at this clinic, seeing Luis A for PT and [facility] for OT.     Have you fallen since last visit: No    Precautions: low fall risk    Pain:  0/10  Location/Type of pain:  Pt does not report pain throughout session.     HEP compliance/understanding: Pt continues to wear R hand extension (resting hand splint) daily in the morning while drinking coffee for 30-60 min, continuing to perform RUE WB during functional tasks    Objective:   Motor Control: continued evidence of poor RUE motor control (proximal better than distal); proximal compensatory movements at R shoulder and elbow noted during [facility] 9HPT task.     Coordination:  Box and Blocks (1:00)- 36 (R), 66 (L)  9HPT- 98 sec (R), 23 sec (L)    Strength (MMT):  RUE  Shld flex/abd 5/5  Elbow flex 4+/5  Forearm pronation/supination 4+/5  Wrist ext 3+/5, flex 4-/5    Pinch  R- 9.5# (2-pt), 10.5# (3-pt), 20.5# (lateral)  L- 20# (2-pt), 20.5# (3-pt), 23.5# (lateral)    Sensation:  Sharp vs dull - mildly impaired dorsal bilaterally, WFL L palmar side, mildly impaired first three fingers palmar side R  Temperature - mildly impaired bilaterally, palmar better than dorsal    Outcome " Measures:  OT Adult Other Outcome Measures  9 Hole Peg Test: 98 sec (R), 23 sec (L)  Box and Block: 36 (R), 66 (L)  Other Outcome Measures: 37 (QuickDash)    Treatment:     Therapeutic Activity: 34 minutes  Objective measurements taken for assessment purposes including QuickDash, RUE MMT, BUE pinch strength, 9HPT and Box&Blocks for coordination assessment    Neuromuscular Re-education: 15 minutes  Further sensation testing of BUEs - sharp vs dull (paper clip point vs finger) and temperature sensation (hot vs cold water directly to skin) testing     Education: purpose and function of deep touch pressure for sensory re-education to reduce hypersensitivity symptoms of distal RUE    HEP Progression:    Add-  Deep/firm touch pressure to RUE for sensory re-education

## 2025-03-20 NOTE — PROGRESS NOTES
Physical Therapy Treatment      Patient Name: Marshall Padilla  MRN: [id number]  Today's Date: 3/20/2025  Visit #: 5  Insurance: 2025 0 COPAY, 50% COINS, 8300 DED, 9200 OOP MAX, 20 VS, NO AUTH REQ   Time Calculation  Start Time: 1045  Stop Time: 1129  Time Calculation (min): 44 min    1. Abnormality of gait        2. Balance problem        3. Decreased ROM of neck        4. Neck pain            ASSESSMENT:  Updated HEP his date to include the isometric hip abduction on the R against the wall.  No complaints with that this date.  Reviewed the bandaging again at home to help with the achilles shortening.  Will continued to benefit from PT to address impairments and improve CLOF and mobility.    GOALS:  By Discharge patient will demo:     Improvement in gait mechanics without accessory / compensatory swing strategies for advancement of R LE.     Reciprocal stair navigation without vaulting of L LE     No falls during POC.     Improvements in strength of R hip, knee and ankle flexion by half a grade.     Subjective reports of improvements in posture.     ABC > 55%    PLAN:  Continue focusing on NMR of R LE in swing and stance    SUBJECTIVE:  Last week is better than the previous week.  Been working on the updated exercises.  Hand feels like it's getting more and more closed the last few days.  Tried to do the wrap but it's hard to keep it on for an extended period of time.  Did speech for the first time.  Will be having a barium swallow done.    OBJECTIVE:    TREATMENT:    Therapeutic Exercise ([id number]): 44 minutes    Isometric Wall Hip Abduction with Ball -- 4 sets x 15 seconds, cues for proper performance.  Challenged, needing to lock out R knee to encourage an appropriate performance.    Cybex R Leg Curls -- 45lbs (increased), 4 sets x 10; 20lbs (maintained) until fatigue with R LE only (will increase next session) -- performing to encourage recruitment of R HS to carryover into other activities.     White Cable Stack Hip  Extensions -- x 10 with 10lbs; 3 sets x 12.5lbs  -- cues for squeezing glutes on R side. Cues for anterior lean and greater ROM     Calf Raises with Deep Stretch -- 2 sets x 10, attempts to bias with R LE, highly challenged by eccentric lowering.

## 2025-03-27 ENCOUNTER — TREATMENT (OUTPATIENT)
Dept: PHYSICAL THERAPY | Facility: CLINIC | Age: 60
End: 2025-03-27
Payer: COMMERCIAL

## 2025-03-27 ENCOUNTER — TREATMENT (OUTPATIENT)
Dept: OCCUPATIONAL THERAPY | Facility: CLINIC | Age: 60
End: 2025-03-27
Payer: COMMERCIAL

## 2025-03-27 DIAGNOSIS — R26.9 ABNORMALITY OF GAIT: Primary | ICD-10-CM

## 2025-03-27 DIAGNOSIS — R29.898 DECREASED ROM OF NECK: ICD-10-CM

## 2025-03-27 DIAGNOSIS — M25.611 DECREASED RANGE OF MOTION OF RIGHT SHOULDER: ICD-10-CM

## 2025-03-27 DIAGNOSIS — M54.2 NECK PAIN: ICD-10-CM

## 2025-03-27 DIAGNOSIS — R29.898 DECREASED GRIP STRENGTH OF RIGHT HAND: ICD-10-CM

## 2025-03-27 DIAGNOSIS — R27.8 COORDINATION IMPAIRMENT: ICD-10-CM

## 2025-03-27 DIAGNOSIS — R26.89 BALANCE PROBLEM: ICD-10-CM

## 2025-03-27 DIAGNOSIS — G35 MULTIPLE SCLEROSIS (MULTI): Primary | ICD-10-CM

## 2025-03-27 PROCEDURE — 97140 MANUAL THERAPY 1/> REGIONS: CPT | Mod: GO

## 2025-03-27 PROCEDURE — 97110 THERAPEUTIC EXERCISES: CPT | Mod: GP

## 2025-03-27 PROCEDURE — 97112 NEUROMUSCULAR REEDUCATION: CPT | Mod: GO

## 2025-03-27 NOTE — PROGRESS NOTES
Physical Therapy Treatment      Patient Name: Marshall Padilla  MRN: [id number]  Today's Date: 3/27/2025  Visit #: 6  Insurance: 2025 0 COPAY, 50% COINS, 8300 DED, 9200 OOP MAX, 20 VS, NO AUTH REQ   Time Calculation  Start Time: 1034  Stop Time: 1115  Time Calculation (min): 41 min    1. Abnormality of gait        2. Balance problem        3. Decreased ROM of neck        4. Neck pain            ASSESSMENT:  Updated HEP again this date.   Challenged by paloff stepping and marching.  No adverse events.  Hope is to address his lateral hips and trunk stability.  Will continue to benefit from PT to address impairments and improve CLOF and mobility.    GOALS:  By Discharge patient will demo:     Improvement in gait mechanics without accessory / compensatory swing strategies for advancement of R LE.     Reciprocal stair navigation without vaulting of L LE     No falls during POC.     Improvements in strength of R hip, knee and ankle flexion by half a grade.     Subjective reports of improvements in posture.     ABC > 55%    PLAN:  Continue focusing on NMR of R LE in swing and stance    SUBJECTIVE:    Was sore after two days after last session.  Tried doing the isometric but was challenged by finding the right item at home.      OBJECTIVE:    TREATMENT:    Therapeutic Exercise ([id number]): 41 minutes    Cybex R Leg Curls --50lbs (increased), 4 sets x 10; 25lbs (increased) until fatigue with R LE only (will increase next session) -- performing to encourage recruitment of R HS to carryover into other activities.     Paloff Sidestepping -- x 5 each direction, green band  Paloff Marching -- x 20 each direction of pull, green band  ^^ 3 sets of each, added to HEP, 2 sets for home.

## 2025-03-27 NOTE — PROGRESS NOTES
Occupational Therapy  Occupational Therapy Treatment Note    Patient Name Marshall Padilla   MRN: [id number]  : 1965  Today's Date: 25  Time Calculation  Start Time: 1132  Stop Time: 1217  Time Calculation (min): 45 min    Visit #: 3    Ins[facility]:  [facility]  Authorization required: No  # of visits approved: 2025: $0 COPAY, 50% COINS, $8300 DED, $9200 OOP MAX  20 VS, NO AUTH REQ     Therapy Diagnoses:   1. Multiple sclerosis (Multi)        2. Coordination impairment        3. Decreased range of motion of right shoulder        4. Decreased  strength of right hand          Assessment:  Pt demonstrated improved passive R shoulder flexion this date with progressive stretch, and improved active R shoulder flexion/abduction after pulley exercises. Pt with dec functional use of RUE for self-care tasks including self-feeding, d/t inc tone at all joints and inc stiffness at shoulder. Pt will benefit from further skilled OT to address these remaining functional, objective and subjective deficits and optimize ADL/IADL functioning.    Plan:      Continue to progress per POC: Continue OT 1x/week for 10 weeks, then re-assess.    Interventions: Neuromuscular reeducation, therapeutic activities, therapeutic exercise, self-care/ADL, manual therapy    Goals:  By discharge,      Pt will demo independence with HEP completion.  Pt will demo and verbalize use of energy conservation/joint protection techniques to increase activity tolerance while completing ADL/IADL tasks.   Pt will maintain R  strength +/- 10# to facilitate independence with ADLs/IADLs.  Pt will demo increased functional use of RUE, as indicated by the QuickDash score; current score is 37.  Pt will maintain R FMC by keeping time on 9 Hole Peg test within +/- 5 seconds, to maximize independence with ADLs/IADLs.  Pt will demo improved RUE motor control and dec spasticity with use of compensatory strategies during daily activities.  Pt will  "demo self-care/ADL/IADL compensation/adaptation use to promote functional independence.     Subjective:   Patient reports his RUE has been tightening up a lot lately, getting very stiff, with difficulty feeding self with RUE. Pt reports digits IV-V tighten up, and the whole R arm tightens up with self-feeding. Pt inquires about frozen shoulder, wondering if that is what is going on with his RUE.     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 Other hx: lower back sx, disc herniation s/p C4-5 and C5-6 anterior fusion (2017), posterior cervical fusion at 3 levels (7/11/22), pt has trialed botox for RUE (\"with bad effects\"). Pt has long history of OP Rehab at this clinic, seeing Luis A for PT and [facility] for OT.     Have you fallen since last visit: No    Precautions: low fall risk    Pain:  0/10  Location/Type of pain:  Pt does not report pain throughout session.     HEP compliance/understanding: Pt continues to wear R hand extension (resting hand splint) daily in the morning while drinking coffee for 30-60 min; inconsistent compliance with RUE WB during functional tasks    Objective:   Function:  Pt with improved RUE WB in standing against tray table with min verbal and tactile cues from OT.     ROM:  improved RUE shoulder PROM flexion to normal range. Improved R shoulder AROM following pulley ex.     Motor Control: muscle twitching in tricep during RUE WB in standing.    Treatment:     Manual Therapy:  20 minutes  OT provided slow progressive stretch/PROM to RUE in all planes    Neuromuscular Re-education: 25 minutes  Pulley ex 2x10 flexion, abduction  Standing RUE WB with LUE forward reaching in 30 second increments    Education: purpose of neuromuscular reeducation and nature of neuro deficits vs muscular weakness; importance of RUE WB during daily tasks.     HEP Progression:  Encouraged RUE WB " during daily tasks, RUE positioning (e.g., having RUE positioned in view during sedentary activity), positioning for sleeping to dec stiffness    Current HEP:  Deep/firm touch pressure to RUE for sensory re-education

## 2025-03-28 ENCOUNTER — TELEPHONE (OUTPATIENT)
Dept: NEUROLOGY | Facility: HOSPITAL | Age: 60
End: 2025-03-28

## 2025-03-28 DIAGNOSIS — R25.2 CRAMP AND SPASM: ICD-10-CM

## 2025-03-28 DIAGNOSIS — G35 MULTIPLE SCLEROSIS (MULTI): Primary | ICD-10-CM

## 2025-03-31 RX ORDER — BACLOFEN 20 MG/1
40 TABLET ORAL 2 TIMES DAILY
Qty: 360 TABLET | Refills: 1 | Status: SHIPPED | OUTPATIENT
Start: 2025-03-31

## 2025-04-01 DIAGNOSIS — G35 MULTIPLE SCLEROSIS (MULTI): ICD-10-CM

## 2025-04-01 RX ORDER — GABAPENTIN 300 MG/1
300 CAPSULE ORAL 4 TIMES DAILY
Qty: 120 CAPSULE | Refills: 5 | Status: SHIPPED | OUTPATIENT
Start: 2025-04-01

## 2025-04-09 ENCOUNTER — TREATMENT (OUTPATIENT)
Dept: OCCUPATIONAL THERAPY | Facility: CLINIC | Age: 60
End: 2025-04-09
Payer: COMMERCIAL

## 2025-04-09 ENCOUNTER — TREATMENT (OUTPATIENT)
Dept: PHYSICAL THERAPY | Facility: CLINIC | Age: 60
End: 2025-04-09
Payer: COMMERCIAL

## 2025-04-09 DIAGNOSIS — R29.898 DECREASED GRIP STRENGTH OF RIGHT HAND: ICD-10-CM

## 2025-04-09 DIAGNOSIS — M54.2 NECK PAIN: ICD-10-CM

## 2025-04-09 DIAGNOSIS — M25.611 DECREASED RANGE OF MOTION OF RIGHT SHOULDER: ICD-10-CM

## 2025-04-09 DIAGNOSIS — R29.898 DECREASED ROM OF NECK: ICD-10-CM

## 2025-04-09 DIAGNOSIS — R26.9 ABNORMALITY OF GAIT: Primary | ICD-10-CM

## 2025-04-09 DIAGNOSIS — R27.8 COORDINATION IMPAIRMENT: ICD-10-CM

## 2025-04-09 DIAGNOSIS — R26.89 BALANCE PROBLEM: ICD-10-CM

## 2025-04-09 DIAGNOSIS — G35 MULTIPLE SCLEROSIS (MULTI): Primary | ICD-10-CM

## 2025-04-09 PROCEDURE — 97110 THERAPEUTIC EXERCISES: CPT | Mod: GP

## 2025-04-09 PROCEDURE — 97140 MANUAL THERAPY 1/> REGIONS: CPT | Mod: GO

## 2025-04-09 PROCEDURE — 97112 NEUROMUSCULAR REEDUCATION: CPT | Mod: GO

## 2025-04-09 PROCEDURE — 97112 NEUROMUSCULAR REEDUCATION: CPT | Mod: GP

## 2025-04-09 NOTE — PROGRESS NOTES
Occupational Therapy  Occupational Therapy Treatment Note    Patient Name Marshall Padilla   MRN: [id number]  : 1965  Today's Date: 25  Time Calculation  Start Time: 1120  Stop Time: 1206  Time Calculation (min): 46 min    Visit #:     [facility]  Authorization required: No  # of visits approved: 2025: $0 COPAY, 50% COINS, $8300 DED, $9200 OOP MAX  20 VS, NO AUTH REQ     Therapy Diagnoses:   1. Multiple sclerosis (Multi)        2. Coordination impairment        3. Decreased range of motion of right shoulder        4. Decreased  strength of right hand          Assessment:  Pt demonstrated inc R shoulder stiffness with dec PROM, and anterior scapular winging noted this date. Pt demonstrated inc tone in R elbow as well. Pt with new onset likely L elbow tendonitis from inc puppy care/walking puppy activity. Pt benefited from sensory re-education and education around tendonitis exercises, with good carryover. Pt will benefit from further skilled OT for continued neuromuscular and sensory reeducation to optimize ADL/IADL functioning.    Plan:      Continue to progress per POC: Continue OT 1x/week for 10 weeks, then re-assess.    Interventions: Neuromuscular reeducation, therapeutic activities, therapeutic exercise, self-care/ADL, manual therapy    Goals:  By discharge,      Pt will demo independence with HEP completion.  Pt will demo and verbalize use of energy conservation/joint protection techniques to increase activity tolerance while completing ADL/IADL tasks.   Pt will maintain R  strength +/- 10# to facilitate independence with ADLs/IADLs.  Pt will demo increased functional use of RUE, as indicated by the QuickDash score; current score is 37.  Pt will maintain R FMC by keeping time on 9 Hole Peg test within +/- 5 seconds, to maximize independence with ADLs/IADLs.  Pt will demo improved RUE motor control and dec spasticity with use of compensatory strategies during daily  "activities.  Pt will demo self-care/ADL/IADL compensation/adaptation use to promote functional independence.     Subjective:   Patient reports last week his wife was out of town and he was taking care of his 8-month-old puppy full-time, walking her multiple times/day (totaling 2-3 miles) and managing her puppy behavior. Pt reports he experienced increased fatigue from increased walking distance (typically he walks ~1 mile/day when wife at home), but feels he managed it well.     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 Other hx: lower back sx, disc herniation s/p C4-5 and C5-6 anterior fusion (2017), posterior cervical fusion at 3 levels (7/11/22), pt has trialed botox for RUE (\"with bad effects\"). Pt has long history of OP Rehab at this clinic, seeing Luis A for PT and [facility] for OT.     Have you fallen since last visit: No, however, pt reports recent near-fall last week on parking lot pavement/curb/ramp.     Precautions: low fall risk    Pain:  0/10  Location/Type of pain:  Pt endorses hypersensitivity/tingling/burning in distal RUE (elbow down) with certain movement and light touch.     HEP compliance/understanding: Pt continues to wear R hand extension (resting hand splint) daily in the morning while drinking coffee for 30-60 min. Pt exhibits inc compliance with RUE WB during functional tasks.     Objective:   Positioning/Alignment: R anterior scapular winging noted    ROM: dec R shoulder flex/abd PROM    Tone: MAS 1+ R elbow    Sensation: tingling/burning down R distal UE during/after pulley ex    Treatment:     Manual Therapy:  20 minutes  OT provided R scapular mobilizations  OT provided slow progressive stretch/PROM to RUE in all planes with focus on elbow and digits.     Neuromuscular Re-education: 26 minutes  Pt participated in reinforced education discussion of Sensory Re-education " strategies to decrease hypersensitivity symptoms in distal RUE: practiced deep/firm touch pressure application and discussed daily routine  Pt participated in bilateral pulley ex 1x15 to promote shoulder flexion  Elbow tendonitis exercises to relieve pain and inc extensor strength: wrist isometrics    Education: sensory re-education, elbow tendonitis education    HEP Progression:   Add-  LUE wrist isometrics  Edgardo shoulder retraction and shoulder elevation/shrugs    Current HEP:  - Deep/firm touch pressure to RUE for sensory re-education  - RUE WB during daily tasks  - RUE positioning considerations

## 2025-04-09 NOTE — PROGRESS NOTES
"Physical Therapy Treatment      Patient Name: Marshall Padilla  MRN: [id number]  Today's Date: 4/9/2025  Visit #: 7  Insurance: 2025 0 COPAY, 50% COINS, 8300 DED, 9200 OOP MAX, 20 VS, NO AUTH REQ   Time Calculation  Start Time: 1033  Stop Time: 1112  Time Calculation (min): 39 min    1. Abnormality of gait        2. Balance problem        3. Decreased ROM of neck        4. Neck pain            ASSESSMENT:  More focus on R LE NMR this date than in previous dates.  Still responds well to NMES.  Fatigues with this, however.  May be showing signs of L forearm extensor tendinopathy.  Will investigate this and neck endurance further at follow up.   Will continue to benefit from PT to address impairments and improve CLOF and mobility.    GOALS:  By Discharge patient will demo:     Improvement in gait mechanics without accessory / compensatory swing strategies for advancement of R LE.     Reciprocal stair navigation without vaulting of L LE     No falls during POC.     Improvements in strength of R hip, knee and ankle flexion by half a grade.     Subjective reports of improvements in posture.     ABC > 55%    PLAN:  Neck endurance.  Address L common extensor tendon discomfort    SUBJECTIVE:    \"Survived\" time alone with the dog.  Been walking a lot with her.  Been having weird forearm and hand pain in L arm / elbow.  Neck can be really bothersome, especially when looking down.    OBJECTIVE:    TREATMENT:    Resisted R Middle Finger Extension -- positive  Resisted R Wrist Extension -- negative    Therapeutic Exercise ([id number]): 24 minutes    Cybex R Leg Curls -- 30lbs, 2 sets x 10; 50lbs (increased), 3 sets x 10; 25lbs (increased) until fatigue with R LE only (will increase next session) -- performing to encourage recruitment of R HS to carryover into other activities.     Wrist / forearm assessment above.  Discussed addressing this at follow-up.  Discussed also need for building up neck endurance to carryover into improvement in " "eating tolerance and fatigue / discomfort that comes during meals.    Neuromuscular Reeducation ([id number]):  15 minutes    Foot Taps on 8\" Riser (300usec, 40Hz, 5 on/off) -- performing over 10 minute period with 5 minute set-up.  Performing to encourage NMR of R DF and R hip flexors to carryover into all other functional mobility.          "

## 2025-04-15 ENCOUNTER — HOSPITAL ENCOUNTER (OUTPATIENT)
Dept: RADIOLOGY | Facility: HOSPITAL | Age: 60
Discharge: HOME | End: 2025-04-15
Payer: COMMERCIAL

## 2025-04-15 DIAGNOSIS — G35 MULTIPLE SCLEROSIS (MULTI): ICD-10-CM

## 2025-04-15 PROCEDURE — 74230 X-RAY XM SWLNG FUNCJ C+: CPT | Performed by: RADIOLOGY

## 2025-04-15 PROCEDURE — 74230 X-RAY XM SWLNG FUNCJ C+: CPT

## 2025-04-15 PROCEDURE — 2500000005 HC RX 250 GENERAL PHARMACY W/O HCPCS: Performed by: RADIOLOGY

## 2025-04-15 PROCEDURE — 92526 ORAL FUNCTION THERAPY: CPT | Mod: GN

## 2025-04-15 PROCEDURE — 92611 MOTION FLUOROSCOPY/SWALLOW: CPT | Mod: GN

## 2025-04-15 RX ADMIN — BARIUM SULFATE 225 ML: 400 SUSPENSION ORAL at 11:52

## 2025-04-15 NOTE — PROCEDURES
Speech-Language Pathology    Outpatient Modified Barium Swallow Study    Patient Name: Marshall Padilla  MRN: [id number]  : 1965  Today's Date: 04/15/25           DIET RECOMMENDATIONS:   - Easy to Chew (IDDSI Level 7)  - Thin liquids (IDDSI Level 0) small sips ,pt prefers cup sips.  Per the results of today's MBSS, patient to continue baseline diet of Easy to Chew solids  consistencies and thins  liquids following swallow strategies listed below:    STRATEGIES:  - Small bites  - Small, single sips  - Alternate consistencies  - Effortful swallow and a reswallow  across bites   - Add moisture to drier  foods  - Upright for all PO intake  - Medications whole as best tolerated        Modified Barium Swallow Study completed. Informed verbal consent obtained prior to completion of exam. Trials of thin, nectar/mildly thick liquid, honey/moderately thick liquid, puree, regular solids and barium tablet with purees attempted but thin liquids  given with success to aid the A-P transport of the large pill .   The study was completed per protocol with various liquid barium consistencies, pudding, solids and a 13mm barium tablet.  A 1.9 cm or .75 inch (outer diameter) ring was placed on the chin in the lateral view and on the lateral, left side of the neck in the a-p view in order to complete objective measurements during swallowing. The anatomic structures and function of the oropharynx, larynx, hypopharynx and cervical esophagus were evaluated.    SLP: Cindy Mensah, SLP   Contact info: [employer] secure chat please      Reason for Referral: 60 y/o Cognitively intact gentlemen referred for a MBSS from OP ST to  further assess the current swallow function to objectively assess the pt's c/o dysphagia ( intermittent choking episodes along with  globus sensations  ) and a severe odynophagia at times during oral intake as well as aid development of a POC for OP . OP swallowing exam completed 3/19/25 showing an oral dysphagia  and a  suspected pharyngeal component based on the pt's c/o the dysphagia sx on the bedside exam.   Patient Hx: MS dx'd in , Scoliosis, muscle spasms, neck pain, myelomalacia, 2017 anterior cervical fusion at C5 thru C6 and then a subsequent Posterior Cervical Fusion at the level of C4 thru C6 in , lumbar disc disease, progressive RLE weakness, heaviness and a partial foot drop, as well as right hemibody worsening spasticity s/p botox injections. Pt receives empiric steroids.    EMG completed  showed no e/o ulnar neuropathy or motor neuron disease.   Respiratory Status: Room air  Current diet: EC7/thins , small sips and bites at a slow rate with effortful swallows.    Pain:  Pain Scale: 0-10  Ratin  Location: throat this date when swallowing , pt self reports on a normal day a rating of 4-6 for his odynophagia.         SLP PLAN:  Skilled SLP Services: Skilled SLP intervention for dysphagia is warranted.  SLP Frequency: 1-2 follow up sessions   Duration:  Treatment/Interventions:   - Oropharyngeal exercises  - Bolus trials  - Compensatory strategy training  - Diet tolerance/advancement  - Patient/caregiver education    Discussed POC: Patient  Discussed Risks/Benefits: Yes  Patient/Caregiver Agreeable: Yes    Short term goals established 04/15/25:   1. Patient will independently implement safe swallowing strategies  ( small bites/sips, effortful re-swallows, and utilize cyclic ingestion  to maximize oropharyngeal swallowing efficiency with use of compensatory strategies during 100% of  observed therapeutic trials.  2. Pt will complete oral lingual strength , coordination and ROM tasks given min cues with > 95 % accuracy.  3. Follow up with ENT .   4. Pt education -completed 4/15/25.     Education Provided: Results and recommendations per MBSS, with video review; recommendations and POC at this time. Verbal understanding and agreement given on all accounts.     Treatment Provided Today: ST provided   education and training to pt  regarding anatomy/physiology of swallow function, risk factors of penetration /aspiration & how to mitigate factors, solid  diet ongoing modifications, and the use of compensatory swallow strategies to promote pt safety and efficiency  upon PO intake .   In addition, ST provided instruction/training on oropharyngeal exercises (re: effortful swallow) with good awareness demonstrated.     Additional Medical Consults Suggested:   - ENT d/t c/o odynphagia    Repeat Study: as needed in the future as /if symptoms progress        Mechanics of the Swallow Summary:  ORAL PHASE:  Lip Closure - No labial escape/anterior loss of bolus   Tongue Control During Bolus Hold - Cohesive bolus between tongue to palatal seal   Bolus prep/mastication - Slow prolonged mastication with complete re-collection necessary   Bolus transport/lingual motion - Mildly Slowed Tongue Motion for A-P movement of the bolus - uncoordinated for the barium pill A/P transport with lingual incoordination evident.   Oral residue - Trace residue lining oral structures of the BOT.     PHARYNGEAL PHASE:  Initiation of pharyngeal swallow - Bolus head at posterior laryngeal surface of epiglottis or varying levels at  the piriform but mostly initiated at the level of the  valleculae.    Soft palate elevation - No bolus between soft palate/pharyngeal wall   Laryngeal elevation - Complete superior movement of the the thyroid cartilage with complete approximation of arytenoids to the epiglottic petiole.    Anterior hyoid excursion - Complete anterior movement   Epiglottic movement - Partial inversion, slow in full consistent deflection  Laryngeal vestibule closure - Mild Incomplete - narrow column of air/contrast in laryngeal vestibule   Pharyngeal stripping wave - Present, however, minimally   diminished   Pharyngeal contraction (A/P view) - Not tested       Pharyngoesophageal segment opening - Complete distension and complete  duration/no obstruction of flow of bolus   Tongue base retraction - Trace column of contrast or air between tongue base and pharyngeal wall at times.   Pharyngeal residue - Trace residue within or on the pharyngeal structures with retrograde trace residue to the level of the piriform from the proximal esophageal  region  d/t prescience of the C-spine hardware at the C5 -C6 level.     ESOPHAGEAL PHASE:  Esophageal clearance - Esophageal retention -minimal -post the 1 minute wait post purees.      SLP Impressions with Severity Rating:   Pt presents with Mild  Oral and Minimal Pharyngeal dysphagia upon completion of modified barium swallow study this date. Swallowing physiology is detailed above.   Impairments most impacting swallowing efficiency include min diminished orolingual coordination,  extended mastication across Regular solids  with pt utilizing a piecemeal swallow with success , partial slow epiglottic deflection -incomplete at times  and  inconsistent trace residue between the BOT/PPW with minimal retrograde flow viewed during the natural re-swallows as well trace residue at the PES most likely d/t presence of C-spine hardware at the C5 -C6 level also contributing to the trace retrograde flow to the level of the piriform. Pt sensate to.   Pt used effortful swallows during the exam without cues with spontaneous or cued repeat swallows.     Patient demonstrated shallow transient penetration that cleared upon the swallow onset on a  thin  sip of thins and a Nectar bolus during the swallow d/t incomplete closure across  the boluses.      No further penetration was observed for any other consistency, and no aspiration was visualized during study.     *Of note: The A-P bolus follow-through is not intended to be utilized as a diagnostic assessment of the esophagus, rather a tool to observe the biomechanical aspects of the swallow continuum and to inform the need for further evaluation by medical specialists, as  applicable.     Strategies attempted- Effortful swallow resulted in improved clearance .   Small sips/bites consumed at a slow rate.     OUTCOME MEASURES:  Functional Oral Intake Scale  Functional Oral Intake Scale: Level 7        total oral diet with no restrictions       Eating Assessment Tool (EAT-10)  16/40   0=No problem, 1=Mild problem, 2=Mild to moderate problem, 3=Moderate problem, 4=Severe problem  My swallowing problem has caused me to lost weight = 2  My swallowing problem interferes with my ability to go out for meals =0  Swallowing liquids takes extra effort = 1  Swallowing solids takes extra effort = 3  Swallowing pills takes extra effort = 2  Swallowing is painful = 3  The pleasure of eating is affected by my swallowing = 2  When I swallow food sticks in my throat =0  I cough when I eat = 2  Swallowing is stressful =  1    Rosenbek's Penetration Aspiration Scale  Thin Liquids: 2. Trace PENETRATION that CLEARS - contrast enter airway, above vocal cords, no residue  Nectar Thick Liquids: 2.Trace  PENETRATION that CLEARS - contrast enter airway, above vocal cords, no residue  Honey Thick Liquids: 1. NO ASPIRATION & NO PENETRATION - no aspiration, contrast does not enter airway  Puree: 1. NO ASPIRATION & NO PENETRATION - no aspiration, contrast does not enter airway  Soft Solid: 1. NOT given.  Solids: 1. NO ASPIRATION & NO PENETRATION - no aspiration, contrast does not enter airway

## 2025-04-22 ENCOUNTER — TREATMENT (OUTPATIENT)
Dept: PHYSICAL THERAPY | Facility: CLINIC | Age: 60
End: 2025-04-22
Payer: COMMERCIAL

## 2025-04-22 ENCOUNTER — TREATMENT (OUTPATIENT)
Dept: OCCUPATIONAL THERAPY | Facility: CLINIC | Age: 60
End: 2025-04-22
Payer: COMMERCIAL

## 2025-04-22 DIAGNOSIS — R29.898 DECREASED ROM OF NECK: ICD-10-CM

## 2025-04-22 DIAGNOSIS — R26.9 ABNORMALITY OF GAIT: Primary | ICD-10-CM

## 2025-04-22 DIAGNOSIS — G35 MULTIPLE SCLEROSIS (MULTI): Primary | ICD-10-CM

## 2025-04-22 DIAGNOSIS — R29.898 DECREASED GRIP STRENGTH OF RIGHT HAND: ICD-10-CM

## 2025-04-22 DIAGNOSIS — M25.611 DECREASED RANGE OF MOTION OF RIGHT SHOULDER: ICD-10-CM

## 2025-04-22 DIAGNOSIS — R27.8 COORDINATION IMPAIRMENT: ICD-10-CM

## 2025-04-22 DIAGNOSIS — R26.89 BALANCE PROBLEM: ICD-10-CM

## 2025-04-22 DIAGNOSIS — M54.2 NECK PAIN: ICD-10-CM

## 2025-04-22 PROCEDURE — 97112 NEUROMUSCULAR REEDUCATION: CPT | Mod: GO

## 2025-04-22 PROCEDURE — 97535 SELF CARE MNGMENT TRAINING: CPT | Mod: GO

## 2025-04-22 PROCEDURE — 97110 THERAPEUTIC EXERCISES: CPT | Mod: GP

## 2025-04-22 PROCEDURE — 97140 MANUAL THERAPY 1/> REGIONS: CPT | Mod: GO

## 2025-04-22 ASSESSMENT — ACTIVITIES OF DAILY LIVING (ADL): HOME_MANAGEMENT_TIME_ENTRY: 10

## 2025-04-22 NOTE — PROGRESS NOTES
Occupational Therapy  Occupational Therapy Treatment Note    Patient Name Marshall Padilla   MRN: [id number]  : 1965  Today's Date: 25  Time Calculation  Start Time: 1118  Stop Time: 1211  Time Calculation (min): 53 min    Visit #:     [facility]  Authorization required: No  # of visits approved: 2025: $0 COPAY, 50% COINS, $8300 DED, $9200 OOP MAX  20 VS, NO AUTH REQ     Therapy Diagnoses:   1. Multiple sclerosis (Multi)        2. Coordination impairment        3. Decreased range of motion of right shoulder        4. Decreased  strength of right hand          Assessment:  OT initiated shoulder mobilizations, performing posterior (grades 1-2 and 3-4) and inferior (grades 1-2 and 3-4) mobilizations with pt in supine; pt with good tolerance and relaxation throughout. Pt with improved L elbow tendonitis this date; benefited from wrist/elbow isometric exercises. Pt with dec functional use RUE this date and inc frustration regarding decline. Pt may benefit from learning ADL/IADL adaptive strategies and neuromuscular electrical stimulation to facilitate RUE activation. Pt will benefit from further skilled OT for continued neuromuscular and sensory reeducation to optimize ADL/IADL functioning.    Plan:      Continue to progress per POC: Continue OT 1x/week for 10 weeks, then re-assess.    Interventions: Neuromuscular reeducation, therapeutic activities, therapeutic exercise, self-care/ADL, manual therapy    Next Visit:  NMES to RUE    Goals:  By discharge,      Pt will demo independence with HEP completion.  Pt will demo and verbalize use of energy conservation/joint protection techniques to increase activity tolerance while completing ADL/IADL tasks.   Pt will maintain R  strength +/- 10# to facilitate independence with ADLs/IADLs.  Pt will demo increased functional use of RUE, as indicated by the QuickDash score; current score is 37.  Pt will maintain R FMC by keeping time on  "9 Hole Peg test within +/- 5 seconds, to maximize independence with ADLs/IADLs.  Pt will demo improved RUE motor control and dec spasticity with use of compensatory strategies during daily activities.  Pt will demo self-care/ADL/IADL compensation/adaptation use to promote functional independence.     Subjective:   Patient reports L elbow is improving, and Luis A PT gave him progression of wrist/elbow isometrics holding dumbbell. Pt reports worsened functional use of RUE and inc spasms of R hand. Putting on a coat and doing laundry are getting more difficult.     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 Other hx: lower back sx, disc herniation s/p C4-5 and C5-6 anterior fusion (2017), posterior cervical fusion at 3 levels (7/11/22), pt has trialed botox for RUE (\"with bad effects\"). Pt has long history of OP Rehab at this clinic, seeing Luis A for PT and [facility] for OT.     Have you fallen since last visit: No    Precautions: low fall risk (near-fall on curb in late March)    Pain:  0/10  Location/Type of pain:  Pt endorses near constant hypersensitivity/tingling/burning in distal RUE (elbow down), and coldness of R elbow.     HEP compliance/understanding: Compliant with daily RUE WB during functional tasks, RUE positioning, RUE AAROM and stretches. Pt continues to wear R hand extension (resting hand splint) daily in the morning while drinking coffee for 30-60 min.     Objective:   Sensory: R elbow cold to touch compared to rest of RUE and LUE.     Affect: pt anxious and discouraged by functional decline    Positioning/Alignment: compensatory movement at R shoulder/elbow during FM coordination d/t dec AROM and motor control of R wrist and digits.     Function:  inc effort and time with 2-pt pinch to grasp beads.    ROM:  dec active extension R wrist observed during FM activity. Inc R shoulder " "flexion/abduction after performance of posterior and inferior shoulder mobilizations.    Motor Control: pt with dec FM coordination    Tone: dec tone at R elbow (MAS 1)    Treatment:     Manual Therapy:  25 minutes  R shoulder mobilizations (posterior grades 1-4 and inferior grades 1-4) in supine   R shoulder and elbow slow progressive stretch/PROM in supine    Self Care Home Management: 10 minutes  Pt received two blue foam tubes to build-up utensils at home and improve ease of self-feeding with RUE.   Fatigue management in the context of promoting pt's ability to complete yard work/home management tasks.    Neuromuscular Re-education: 18 minutes  FMC activity: opening \"pill box\" and placing 10 yellow beads in left-most container with RUE.   Discussion of continued development of RUE sensory symptoms.     Education: reviewed L elbow tendonitis isometric exercises; discussed possible etiology of RUE sensory symptoms    HEP Progression: Pt encouraged to complete RUE stretching daily at minimum and prioritize functional activities over strengthening routine when experiencing inc fatigue.     Current HEP:  - Deep/firm touch pressure to RUE for sensory re-education  - RUE WB during daily tasks  - RUE positioning considerations  - LUE wrist isometrics  - Edgardo shoulder retraction and elevation/shrugs  "

## 2025-04-25 ENCOUNTER — TELEPHONE (OUTPATIENT)
Dept: NEUROSURGERY | Facility: CLINIC | Age: 60
End: 2025-04-25
Payer: COMMERCIAL

## 2025-05-06 ENCOUNTER — TREATMENT (OUTPATIENT)
Dept: PHYSICAL THERAPY | Facility: CLINIC | Age: 60
End: 2025-05-06
Payer: COMMERCIAL

## 2025-05-06 ENCOUNTER — TREATMENT (OUTPATIENT)
Dept: OCCUPATIONAL THERAPY | Facility: CLINIC | Age: 60
End: 2025-05-06
Payer: COMMERCIAL

## 2025-05-06 DIAGNOSIS — R27.8 COORDINATION IMPAIRMENT: ICD-10-CM

## 2025-05-06 DIAGNOSIS — M25.611 DECREASED RANGE OF MOTION OF RIGHT SHOULDER: ICD-10-CM

## 2025-05-06 DIAGNOSIS — R29.898 DECREASED ROM OF NECK: ICD-10-CM

## 2025-05-06 DIAGNOSIS — M54.2 NECK PAIN: ICD-10-CM

## 2025-05-06 DIAGNOSIS — R26.89 BALANCE PROBLEM: ICD-10-CM

## 2025-05-06 DIAGNOSIS — R29.898 DECREASED GRIP STRENGTH OF RIGHT HAND: ICD-10-CM

## 2025-05-06 DIAGNOSIS — R26.9 ABNORMALITY OF GAIT: Primary | ICD-10-CM

## 2025-05-06 DIAGNOSIS — G35 MULTIPLE SCLEROSIS (MULTI): Primary | ICD-10-CM

## 2025-05-06 PROCEDURE — 97140 MANUAL THERAPY 1/> REGIONS: CPT | Mod: GO

## 2025-05-06 PROCEDURE — 97535 SELF CARE MNGMENT TRAINING: CPT | Mod: GO

## 2025-05-06 PROCEDURE — 97110 THERAPEUTIC EXERCISES: CPT | Mod: GP

## 2025-05-06 PROCEDURE — 97112 NEUROMUSCULAR REEDUCATION: CPT | Mod: GO

## 2025-05-06 PROCEDURE — 97112 NEUROMUSCULAR REEDUCATION: CPT | Mod: GP

## 2025-05-06 ASSESSMENT — ACTIVITIES OF DAILY LIVING (ADL): HOME_MANAGEMENT_TIME_ENTRY: 15

## 2025-05-06 NOTE — PROGRESS NOTES
"Physical Therapy Treatment      Patient Name: Marshall Padilla  MRN: [id number]  Today's Date: 5/6/2025  Visit #: 9  Insurance: 2025 0 COPAY, 50% COINS, 8300 DED, 9200 OOP MAX, 20 VS, NO AUTH REQ   Time Calculation  Start Time: 1031  Stop Time: 1115  Time Calculation (min): 44 min    1. Abnormality of gait        2. Balance problem        3. Decreased ROM of neck        4. Neck pain            ASSESSMENT:  Focus on R LE this date.  Apparent how weak his R LE is compared to L.  Responds well to NMES still.  However, we may incorporate BFR to see if this may help / make a difference in his strength.  Receptive.    GOALS:  By Discharge patient will demo:     Improvement in gait mechanics without accessory / compensatory swing strategies for advancement of R LE.     Reciprocal stair navigation without vaulting of L LE     No falls during POC.     Improvements in strength of R hip, knee and ankle flexion by half a grade.     Subjective reports of improvements in posture.     ABC > 55%    PLAN:  BFR to R LE    SUBJECTIVE:    \"Same old.\"  Staying busy.  Gets frustrating because gets harder to do things and feels like venting falls on deaf ears.  Leaving for [place] tomorrow.  Been walking daily with the dog.    Tried doing some of the L arm exercises for his elbow.  Feeling better but because it is his \"good\" arm, can't feel like can heal fully or as quickly.    Has appointment scheduled to see Dr. Cisse in August for neck.    R hip hasn't been popping as much.    OBJECTIVE:    TREATMENT:    Therapeutic Exercise (33307): 30 minutes    Cybex R Leg Curls -- 30lbs, x 15, warm-up; 50lbs, 3 sets x 12 (increased); 30lbs (maintained) until fatigue with R LE only -- performing to encourage recruitment of R HS to carryover into other activities.     Discussed BFR for R LE, principles, contraindications, benefits.    Neuromuscular Reeducation (55687):  14 minutes     Foot Taps on 12\" Riser (300usec, 40Hz, 5 on/off, 44mACC) -- performing " over 14 minute period (short rest break and set-up time built in.  Performing to encourage NMR of R DF and R hip flexors to carryover into all other functional mobility.

## 2025-05-06 NOTE — PROGRESS NOTES
Occupational Therapy  Occupational Therapy Treatment Note    Patient Name Marshall Padilla   MRN: [id number]  : 1965  Today's Date: 25  Time Calculation  Start Time: 1117  Stop Time: 1217  Time Calculation (min): 60 min    Visit #:     [facility]  Authorization required: No  # of visits approved: 2025: $0 COPAY, 50% COINS, $8300 DED, $9200 OOP MAX  20 VS, NO AUTH REQ     Therapy Diagnoses:   1. Multiple sclerosis (Multi)        2. Coordination impairment        3. Decreased range of motion of right shoulder        4. Decreased  strength of right hand          Assessment:  Pt demo continued benefit from R posterior and inferior shoulder mobilizations. Pt demo good carryover of shoulder ER/IR exercises to facilitate inc AROM to inc pt's ability to reach behind back. Pt also demo good teach back and demo of body mechanics and joint protection strategies discussed to reduce strain on LUE in the context of dec functional use of RUE. Pt demo good tolerance to initiation of NMES application to RUE as well to facilitate deep extensor stretch and inc AROM of digits.  Pt with continued progressive dec in functional use RUE and will benefit from further skilled OT for continued neuromuscular and sensory reeducation and compensation/adaptation training to optimize ADL/IADL functioning.    Plan:      Continue to progress per POC: Continue OT 1x/week for 10 weeks, then re-assess.    Interventions: Neuromuscular reeducation, therapeutic activities, therapeutic exercise, self-care/ADL, manual therapy    Goals:  By discharge,      Pt will demo independence with HEP completion.  Pt will demo and verbalize use of energy conservation/joint protection techniques to increase activity tolerance while completing ADL/IADL tasks.   Pt will maintain R  strength +/- 10# to facilitate independence with ADLs/IADLs.  Pt will demo increased functional use of RUE, as indicated by the QuickDash score;  "current score is 37.  Pt will maintain R FMC by keeping time on 9 Hole Peg test within +/- 5 seconds, to maximize independence with ADLs/IADLs.  Pt will demo improved RUE motor control and dec spasticity with use of compensatory strategies during daily activities.  Pt will demo self-care/ADL/IADL compensation/adaptation use to promote functional independence.     Subjective:   Pt reports R arm felt a lot better after initiation of shoulder mobs last tx session, and effects lasted beyond tx day. Pt reports he has been doing a lot of yard work/house work; he painted, sanded, washed his deck using L arm a lot, also cutting grass on ride mower. Pt reports Luis A, his PT, did finger test for elbow tendonitis; reports his elbow still bothers him, is not completely healed, but better than it was.     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 Other hx: lower back sx, disc herniation s/p C4-5 and C5-6 anterior fusion (2017), posterior cervical fusion at 3 levels (7/11/22), pt has trialed botox for RUE (\"with bad effects\"). Pt has long history of OP Rehab at this clinic, seeing Luis A for PT and [facility] for OT.     Have you fallen since last visit: No    Precautions: low fall risk (near-fall on curb in late March)    Pain:  0/10  Location/Type of pain:  Pt endorses near constant hypersensitivity/tingling/burning in distal RUE (elbow down), and coldness of R elbow.     HEP compliance/understanding: Compliant with daily RUE WB during functional tasks, RUE positioning, RUE AAROM and stretches. Pt continues to wear R hand extension (resting hand splint) daily in the morning while drinking coffee for 30-60 min.     Objective:   ROM:  improved R shoulder abduction and flexion following shoulder mobs; improved active R wrist and digit extension following NMES application.     Treatment:     Manual Therapy:  20 " minutes  R shoulder mobilizations (posterior grades 1-4 and inferior grades 1-3) in supine.  R shoulder slow progressive stretch/PROM in supine: flex, abd, ER, IR.     Self Care Home Management: 15 minutes  Application of body mechanics and joint protection strategies to daily activities to reduce strain on LUE given dec functional use of RUE.    Neuromuscular Re-education: 25 minutes  Cane/dowel ex in sitting to facilitate RUE shoulder ER  Review of passive R shoulder ER stretch against table and shoulder abduction table slide ex.   NMES application to R distal UE to facilitate wrist and digit extension x13 min 10/10 biphasic.     Education: body mechanics and joint protection strategies for UEs    HEP Progression:   Add-  Access Code: [email or web address]  URL: [email or web address]  Date: 05/06/2025  Prepared by: Magalis Ballard    Exercises  - Seated Shoulder External Rotation AAROM with Dowel  - 1 x daily - 7 x weekly - 3 sets - 10 reps  - Supine Shoulder External Rotation with Dowel  - 1 x daily - 7 x weekly - 3 sets - 10 reps  - Supine Shoulder External Internal Rotation AAROM with Dowel  - 1 x daily - 7 x weekly - 3 sets - 10 reps  - Seated Shoulder External Rotation PROM on Table  - 1 x daily - 7 x weekly - 3 sets - 10 reps  - Seated Shoulder Abduction Towel Slide at Table Top  - 1 x daily - 7 x weekly - 3 sets - 10 reps  - Shoulder Internal Rotation with Resistance  - 1 x daily - 7 x weekly - 3 sets - 10 reps  - Shoulder External Rotation with Anchored Resistance  - 1 x daily - 7 x weekly - 3 sets - 10 reps    Current HEP:  - Deep/firm touch pressure to RUE for sensory re-education  - RUE WB during daily tasks  - RUE positioning considerations  - LUE wrist isometrics  - Edgardo shoulder retraction and elevation/shrugs

## 2025-05-22 ENCOUNTER — APPOINTMENT (OUTPATIENT)
Dept: NEUROLOGY | Facility: CLINIC | Age: 60
End: 2025-05-22
Payer: COMMERCIAL

## 2025-05-29 ENCOUNTER — TREATMENT (OUTPATIENT)
Dept: PHYSICAL THERAPY | Facility: CLINIC | Age: 60
End: 2025-05-29
Payer: COMMERCIAL

## 2025-05-29 ENCOUNTER — TREATMENT (OUTPATIENT)
Dept: OCCUPATIONAL THERAPY | Facility: CLINIC | Age: 60
End: 2025-05-29
Payer: COMMERCIAL

## 2025-05-29 DIAGNOSIS — R26.9 ABNORMALITY OF GAIT: Primary | ICD-10-CM

## 2025-05-29 DIAGNOSIS — R26.89 BALANCE PROBLEM: ICD-10-CM

## 2025-05-29 DIAGNOSIS — R27.8 COORDINATION IMPAIRMENT: ICD-10-CM

## 2025-05-29 DIAGNOSIS — R29.898 DECREASED ROM OF NECK: ICD-10-CM

## 2025-05-29 DIAGNOSIS — G35 MULTIPLE SCLEROSIS (MULTI): Primary | ICD-10-CM

## 2025-05-29 DIAGNOSIS — M54.2 NECK PAIN: ICD-10-CM

## 2025-05-29 DIAGNOSIS — R29.898 DECREASED GRIP STRENGTH OF RIGHT HAND: ICD-10-CM

## 2025-05-29 DIAGNOSIS — M25.611 DECREASED RANGE OF MOTION OF RIGHT SHOULDER: ICD-10-CM

## 2025-05-29 PROCEDURE — 97110 THERAPEUTIC EXERCISES: CPT | Mod: GP

## 2025-05-29 PROCEDURE — 97112 NEUROMUSCULAR REEDUCATION: CPT | Mod: GP

## 2025-05-29 PROCEDURE — 97140 MANUAL THERAPY 1/> REGIONS: CPT | Mod: GO

## 2025-05-29 PROCEDURE — 97112 NEUROMUSCULAR REEDUCATION: CPT | Mod: GO

## 2025-05-29 NOTE — PROGRESS NOTES
"Physical Therapy Treatment      Patient Name: Marshall Padilla  MRN: [id number]  Today's Date: 2025  Visit #: 10  Insurance:  0 COPAY, 50% COINS, 8300 DED, 9200 OOP MAX, 20 VS, NO AUTH REQ   Time Calculation  Start Time: 831  Stop Time: 915  Time Calculation (min): 44 min    1. Abnormality of gait        2. Balance problem        3. Decreased ROM of neck        4. Neck pain            ASSESSMENT:  Initiated BFR for R LE this date, tolerating well without adverse events.  Good participation throughout.  Apparent how fatigued he is by end of session, but not in an unsafe way.  Will continue doing this as patient reflects on it over the next few days.    GOALS:  By Discharge patient will demo:     Improvement in gait mechanics without accessory / compensatory swing strategies for advancement of R LE.     Reciprocal stair navigation without vaulting of L LE     No falls during POC.     Improvements in strength of R hip, knee and ankle flexion by half a grade.     Subjective reports of improvements in posture.     ABC > 55%    PLAN:  BFR to R LE    SUBJECTIVE:    Trip was really good.  Body seemed to do okay.      Neck has been bothering him into left shoulder since helped set up an awning on back patio.       OBJECTIVE:    TREATMENT:    Therapeutic Exercise (29433): 36 minutes    R LE BFR:  - LOP 163mmHg  - PTP 103mmHg     All contraindications and precautions discussed at this and previous appointment.  Performing the followin reps, 3 sets x 15 reps    White Cable Stack Hip Extensions -- x 10 with 10lbs; 3 sets x 10 with 12.5lbs  -- cues for squeezing glutes on R side. Cues for anterior lean and greater ROM     Discussed BFR for R LE, principles, contraindications, benefits.    Neuromuscular Reeducation (30192):  8 minutes     Foot Taps on 12\" Riser (300usec, 40Hz, 5 on/off, 43mACC) -- performing over 8 minute period (short rest break and set-up time built in.  Performing to encourage NMR of R DF and R hip " flexors to carryover into all other functional mobility.

## 2025-05-29 NOTE — PROGRESS NOTES
Occupational Therapy  Occupational Therapy Treatment Note    Patient Name Marshall Padilla   MRN: [id number]  : 1965  Today's Date: 25  Time Calculation  Start Time: 919  Stop Time:   Time Calculation (min): 45 min    Visit #:     Ins[facility]:  [facility]  Authorization required: No  # of visits approved: 2025: $0 COPAY, 50% COINS, $8300 DED, $9200 OOP MAX  20 VS, NO AUTH REQ     Therapy Diagnoses:   1. Multiple sclerosis (Multi)        2. Coordination impairment        3. Decreased range of motion of right shoulder        4. Decreased  strength of right hand          Assessment:  Pt demo fair tolerance to initiation of weighted bar ex in supine requiring coordination of BUEs and effortful positioning of RUE. Pt able to straighten R elbow and wrist with verbal/tactile cues, and maintain for 1-2 reps before further cues required. Pt demo dec tone in R elbow and wrist this date (MAS 0) with no difficulty passively ranging elbow and wrist to full flexion/extension this date. Pt continues to tolerate a high level of IADL participation at home including remodeling his deck and building a [place], walking his dog daily, but continues to experience L elbow tendonitis symptoms and dec use of RUE overall with inc fall risk. Pt will benefit from continued skilled OT to learn task modifications, joint protection, adaptations for daily occupations that are important to him, as well as continue neuromuscular reeducation to promote functional use of RUE to optimize ADL/IADL safety and functioning.     Plan:      Continue to progress per POC: Continue OT 1x/week for 10 weeks, then re-assess.    Interventions: Neuromuscular reeducation, therapeutic activities, therapeutic exercise, self-care/ADL, manual therapy    Future Visit:  R  strengthening - add to HEP    Goals:  By discharge,      Pt will demo independence with HEP completion.  Pt will demo and verbalize use of energy conservation/joint  "protection techniques to increase activity tolerance while completing ADL/IADL tasks.   Pt will maintain R  strength +/- 10# to facilitate independence with ADLs/IADLs.  Pt will demo increased functional use of RUE, as indicated by the QuickDash score; current score is 37.  Pt will maintain R FMC by keeping time on 9 Hole Peg test within +/- 5 seconds, to maximize independence with ADLs/IADLs.  Pt will demo improved RUE motor control and dec spasticity with use of compensatory strategies during daily activities.  Pt will demo self-care/ADL/IADL compensation/adaptation use to promote functional independence.     Subjective:   Pt reports he was able to work out at the resort in [place]: lat pull downs, chest press, leg machines. After [place] trip, built a gazebo in his backyard, heavy outdoor work including climbing ladder and screwing over 200 screws; sanded, stripped, and painted the deck. Pt reports he fell when working on the deck and caught himself with leg, did not fall to ground. Pt reports weakness of R  and RUE in general, with dec use despite continued participation in heavy IADLs.     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 Other hx: lower back sx, disc herniation s/p C4-5 and C5-6 anterior fusion (2017), posterior cervical fusion at 3 levels (7/11/22), pt has trialed botox for RUE (\"with bad effects\"). Pt has long history of OP Rehab at this clinic, seeing Luis A for PT and Wang for OT.     Have you fallen since last visit: No    Precautions: low fall risk (near-fall on deck late-May 2025)    Pain:  0/10  Location/Type of pain:  Pt mentions continued hypersensitivity and/or numbness of RUE most of the time.     HEP compliance/understanding: Intermittent compliance    Objective:   Positioning/Alignment: pt able to correct RUE positioning during exercise with mod verbal/tactile " cueing     Function: mild difficulty maintaining RUE  on weighted bar with pinky slipping off    ROM:  full R shoulder flexion and elbow extension AAROM present with use of weighted bar     Strength: tolerates 2x5 4# > 9# weighted bar for shoulder flex/ext and chest press in supine    Tone: improved at R elbow and wrist to MAS 0    Treatment:     Manual Therapy:  30 minutes  OT provided R shoulder mobilizations posterior (grades 1-3) and inferior (grades 1-3) 2x10 pulsing with pt in supine with good tolerance  OT provided slow progressive stretch/PROM to RUE shoulder flex/abd, elbow flex/ext, wrist flex/ext with pt in supine    Neuromuscular Re-education: 15 minutes  Weighted bar ex 2x5 4# > 9# bar: shoulder flex/ext and chest press in supine    Education: RUE positioning to promote bilateral integration and coordination (I.e., elbow extension, wrist in neutral), continued reinforcement of joint protection strategies/body mechanics to reduce strain on LUE and promote functional use of RUE.    HEP Progression:   Add-  - Pt to resume use of  strengthener at home daily  - Cane/dowel/weighted bar (up to 10#) ex in supine: shoulder flexion/ex and chest press    Current HEP:  - Deep/firm touch pressure to RUE for sensory re-education  - RUE WB during daily tasks  - RUE positioning considerations  - LUE wrist isometrics  - Edgardo shoulder retraction and elevation/shrugs  - Cane/dowel/weighted bar AAROM ex (seated shoulder ER, supine shoulder ER/IR), seated shoulder ER PROM on table, table slides (flex/abd), shoulder ER/IR with anchored resistance (Access Code: [email or web address]; 5/6/25)

## 2025-06-12 ENCOUNTER — TREATMENT (OUTPATIENT)
Dept: OCCUPATIONAL THERAPY | Facility: CLINIC | Age: 60
End: 2025-06-12
Payer: COMMERCIAL

## 2025-06-12 ENCOUNTER — TREATMENT (OUTPATIENT)
Dept: PHYSICAL THERAPY | Facility: CLINIC | Age: 60
End: 2025-06-12
Payer: COMMERCIAL

## 2025-06-12 DIAGNOSIS — M54.2 NECK PAIN: ICD-10-CM

## 2025-06-12 DIAGNOSIS — R26.89 BALANCE PROBLEM: ICD-10-CM

## 2025-06-12 DIAGNOSIS — M25.611 DECREASED RANGE OF MOTION OF RIGHT SHOULDER: ICD-10-CM

## 2025-06-12 DIAGNOSIS — R27.8 COORDINATION IMPAIRMENT: ICD-10-CM

## 2025-06-12 DIAGNOSIS — R29.898 DECREASED GRIP STRENGTH OF RIGHT HAND: ICD-10-CM

## 2025-06-12 DIAGNOSIS — R29.898 DECREASED ROM OF NECK: ICD-10-CM

## 2025-06-12 DIAGNOSIS — R26.9 ABNORMALITY OF GAIT: Primary | ICD-10-CM

## 2025-06-12 DIAGNOSIS — G35 MULTIPLE SCLEROSIS (MULTI): Primary | ICD-10-CM

## 2025-06-12 PROCEDURE — 97140 MANUAL THERAPY 1/> REGIONS: CPT | Mod: GO

## 2025-06-12 PROCEDURE — 97110 THERAPEUTIC EXERCISES: CPT | Mod: GP

## 2025-06-12 PROCEDURE — 97112 NEUROMUSCULAR REEDUCATION: CPT | Mod: GO

## 2025-06-12 PROCEDURE — 97112 NEUROMUSCULAR REEDUCATION: CPT | Mod: GP

## 2025-06-12 NOTE — PROGRESS NOTES
"Physical Therapy Treatment      Patient Name: Marshall Padilla  MRN: [id number]  Today's Date: 6/12/2025  Visit #: 11  Insurance: 2025 0 COPAY, 50% COINS, 8300 DED, 9200 OOP MAX, 20 VS, NO AUTH REQ   Time Calculation  Start Time: 1131  Stop Time: 1214  Time Calculation (min): 43 min    1. Abnormality of gait        2. Balance problem        3. Decreased ROM of neck        4. Neck pain            ASSESSMENT:  More activities this session.  More challenged by BFR this date but no adverse events.  Good participation throughout.  Fatigued in hip and knee flexion by end of session.  Will continue doing this as patient reflects on it over the next few days.    GOALS:  By Discharge patient will demo:     Improvement in gait mechanics without accessory / compensatory swing strategies for advancement of R LE.     Reciprocal stair navigation without vaulting of L LE     No falls during POC.     Improvements in strength of R hip, knee and ankle flexion by half a grade.     Subjective reports of improvements in posture.     ABC > 55%    PLAN:  Continue with BFR to R LE    SUBJECTIVE:    \"Peaks and valleys.\"  When cutting the lawn the other day had \"big shock from my head to toes.  Felt like I got struck by lightning.\"      L elbow still really hurts because has to use it for everything.    Went for a walk this morning with dog (1.22 miles).      After last time did report some genu recurvatum on the R but wasn't feeling too bad.    OBJECTIVE:    TREATMENT:    Therapeutic Exercise (56681): 30 minutes    R LE BFR:  - LOP 175mmHg  - PTP 140mmHg     All contraindications and precautions discussed at this and previous appointment.  Performing the following:  Heel Slides -- 30 reps, 3 sets x 15 reps -- significantly fatigued by end of session    White Cable Stack Hip Extensions -- x 10 with 10lbs; 3 sets x 10 with 12.5lbs  -- cues for squeezing glutes on R side. Cues for anterior lean and greater ROM     Discussed BFR for R LE, principles, " "contraindications, benefits.    Neuromuscular Reeducation (97457):  13 minutes     Foot Taps on 16\" Riser (300usec, 40Hz, 5 on/off, 43mACC) -- performing over 13 minute period (short rest break and set-up time built in.  Performing to encourage NMR of R DF and R hip flexors to carryover into all other functional mobility.         "

## 2025-06-12 NOTE — PROGRESS NOTES
Occupational Therapy  Occupational Therapy Treatment Note    Patient Name Marshall Padilla   MRN: [id number]  : 1965  Today's Date: 25  Time Calculation  Start Time: 1051  Stop Time: 1131  Time Calculation (min): 40 min    Visit #:     [facility]  Authorization required: No  # of visits approved: 2025: $0 COPAY, 50% COINS, $8300 DED, $9200 OOP MAX  20 VS, NO AUTH REQ     Therapy Diagnoses:   1. Multiple sclerosis (Multi)        2. Coordination impairment        3. Decreased range of motion of right shoulder        4. Decreased  strength of right hand          Assessment:  Pt demo maintenance/mild improvement in R  since reinitiating R  strengthening at home. Pt demo continued impairment with functional use of RUE and evidences further difficulty with R elbow and digit extension. With dec use of RUE, pt also demo exacerbation of L elbow epicondylitis and benefits from review of exercises and protocol to facilitate L elbow healing. Pt demo good tolerance to initiation of RUE only standing wall push-ups this date with small increase in R hand relaxation after exercise noted. Pt will benefit from continued skilled OT to learn task modifications, joint protection, adaptations for daily occupations that are important to him, as well as continue neuromuscular reeducation to promote functional use of RUE to optimize ADL/IADL safety and functioning.     Plan:      Continue to progress per POC: Continue OT 1x/week for 10 weeks, then re-assess.    Interventions: Neuromuscular reeducation, therapeutic activities, therapeutic exercise, self-care/ADL, manual therapy    Future Visit:  R  strengthening - add to HEP    Goals:  By discharge,      Pt will demo independence with HEP completion.  Pt will demo and verbalize use of energy conservation/joint protection techniques to increase activity tolerance while completing ADL/IADL tasks.   Pt will maintain R  strength +/- 10#  "to facilitate independence with ADLs/IADLs.  Pt will demo increased functional use of RUE, as indicated by the QuickDash score; current score is 37.  Pt will maintain R FMC by keeping time on 9 Hole Peg test within +/- 5 seconds, to maximize independence with ADLs/IADLs.  Pt will demo improved RUE motor control and dec spasticity with use of compensatory strategies during daily activities.  Pt will demo self-care/ADL/IADL compensation/adaptation use to promote functional independence.     Subjective:   Pt reports he has his MRI on July 3 and then follow-up with [facility] Neurologist later in July. Pt reports Lhermitte's sign occurred down L side when mowing with push mower the other day and navigating around a tree. Pt reports he has been keeping up with exercises and doing the  strengthening which is helping. States he has good days and bad days. Pt reports continuing to wear EMS glove which helps relax his R hand in the short-term. Pt requesting to work on relaxing the R hand during today's tx session.     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 Other hx: lower back sx, disc herniation s/p C4-5 and C5-6 anterior fusion (2017), posterior cervical fusion at 3 levels (7/11/22), pt has trialed botox for RUE (\"with bad effects\"). Pt has long history of OP Rehab at this clinic, seeing Luis A for PT and Wang for OT.     Have you fallen since last visit: No    Precautions: low fall risk (near-fall on deck late-May 2025)    Pain:  0/10 at rest  Location/Type of pain:  Pt reports shooting hypersensitivity/tingling up R arm during RUE WB against mat table when OT straightens thumb; does not rate. L elbow sore d/t continued epicondylitis; does not rate.     HEP compliance/understanding: Intermittent compliance; pt reports he reinitiated  strengthener exercises    Objective:   Function: pt able to " maintain R  on arm bike for duration of exercise (x6 min) with use of dycem and min cues to tighten       ROM: difficulty actively fully extending R elbow and thumb    Strength: pt able to tolerate 2x5 wall push-ups with RUE (with OT maintaining RUE positioning)    Tone: mild tone throughout RUE    Treatment:     Manual Therapy:  8 minutes  OT provided slow progressive stretch to R distal arm including elbow wrist and digits    Neuromuscular Re-education: 32 minutes  RUE side-to-side WB sitting edge of mat table with OT facilitating R elbow extension and R thumb extension  Pro1 SciFit Arm Bike x3 min fwd, 3 min bkwd with use of dycem to aid R   Standing wall push-ups 1x6 BUEs, 2x5 RUE only with OT facilitating R elbow and digit extension + dycem under R hand to maintain R hand placement against wall.     Education: gave 2 sheets of dycem to facilitate R  and RUE WB at home; RUE WB repetitive trials to facilitate relaxation of R hand; review of L elbow epicondylitis exercises and heat vs ice to facilitate dec pain and need for L elbow rest to heal the overuse injury.     HEP Progression:   Add-  - RUE side-to-side WB in sitting EOB/edge of couch - 3x30 sec holds with distinguished RB in between  - Standing push-ups BUEs and RUE only with focus on achieving full R elbow extension and maintaining R hand position against wall     Current HEP:  - Deep/firm touch pressure to RUE for sensory re-education  - RUE WB during daily tasks  - RUE positioning considerations  - LUE wrist isometrics  - Edgardo shoulder retraction and elevation/shrugs  - Cane/dowel/weighted bar AAROM ex (seated shoulder ER, supine shoulder ER/IR), seated shoulder ER PROM on table, table slides (flex/abd), shoulder ER/IR with anchored resistance (Access Code: [email or web address]; 5/6/25)  - Pt to resume use of  strengthener at home daily  - Cane/dowel/weighted bar (up to 10#) ex in supine: shoulder flexion/ex and chest press

## 2025-06-24 DIAGNOSIS — D84.821 DRUG-INDUCED IMMUNODEFICIENCY (MULTI): Primary | ICD-10-CM

## 2025-06-24 DIAGNOSIS — Z79.899 DRUG-INDUCED IMMUNODEFICIENCY (MULTI): Primary | ICD-10-CM

## 2025-06-24 DIAGNOSIS — G35 MULTIPLE SCLEROSIS (MULTI): ICD-10-CM

## 2025-06-24 RX ORDER — DIPHENHYDRAMINE HYDROCHLORIDE 50 MG/ML
50 INJECTION, SOLUTION INTRAMUSCULAR; INTRAVENOUS AS NEEDED
OUTPATIENT
Start: 2025-07-17

## 2025-06-24 RX ORDER — FAMOTIDINE 10 MG/ML
20 INJECTION, SOLUTION INTRAVENOUS ONCE AS NEEDED
OUTPATIENT
Start: 2025-07-17

## 2025-06-24 RX ORDER — ALBUTEROL SULFATE 0.83 MG/ML
3 SOLUTION RESPIRATORY (INHALATION) AS NEEDED
OUTPATIENT
Start: 2025-07-17

## 2025-06-24 RX ORDER — ACETAMINOPHEN 325 MG/1
650 TABLET ORAL ONCE
OUTPATIENT
Start: 2025-07-17

## 2025-06-24 RX ORDER — EPINEPHRINE 0.3 MG/.3ML
0.3 INJECTION SUBCUTANEOUS EVERY 5 MIN PRN
OUTPATIENT
Start: 2025-07-17

## 2025-06-24 RX ORDER — DIPHENHYDRAMINE HCL 25 MG
25 TABLET ORAL ONCE
OUTPATIENT
Start: 2025-07-17

## 2025-06-26 ENCOUNTER — APPOINTMENT (OUTPATIENT)
Dept: OCCUPATIONAL THERAPY | Facility: CLINIC | Age: 60
End: 2025-06-26
Payer: COMMERCIAL

## 2025-06-26 ENCOUNTER — APPOINTMENT (OUTPATIENT)
Dept: PHYSICAL THERAPY | Facility: CLINIC | Age: 60
End: 2025-06-26
Payer: COMMERCIAL

## 2025-06-26 DIAGNOSIS — R27.8 COORDINATION IMPAIRMENT: ICD-10-CM

## 2025-06-26 DIAGNOSIS — M25.611 DECREASED RANGE OF MOTION OF RIGHT SHOULDER: ICD-10-CM

## 2025-06-26 DIAGNOSIS — M54.2 NECK PAIN: ICD-10-CM

## 2025-06-26 DIAGNOSIS — G35 MULTIPLE SCLEROSIS (MULTI): Primary | ICD-10-CM

## 2025-06-26 DIAGNOSIS — R29.898 DECREASED GRIP STRENGTH OF RIGHT HAND: ICD-10-CM

## 2025-06-26 DIAGNOSIS — R26.9 ABNORMALITY OF GAIT: Primary | ICD-10-CM

## 2025-06-26 DIAGNOSIS — R26.89 BALANCE PROBLEM: ICD-10-CM

## 2025-06-26 DIAGNOSIS — R29.898 DECREASED ROM OF NECK: ICD-10-CM

## 2025-06-26 NOTE — PROGRESS NOTES
"Physical Therapy Treatment      Patient Name: Marshall Padilla  MRN: [id number]  Today's Date: 6/26/2025  Visit #: 12  Insurance: 2025 0 COPAY, 50% COINS, 8300 DED, 9200 OOP MAX, 20 VS, NO AUTH REQ        1. Abnormality of gait        2. Balance problem        3. Decreased ROM of neck        4. Neck pain            ASSESSMENT:  More activities this session.  More challenged by BFR this date but no adverse events.  Good participation throughout.  Fatigued in hip and knee flexion by end of session.  Will continue doing this as patient reflects on it over the next few days.    GOALS:  By Discharge patient will demo:     Improvement in gait mechanics without accessory / compensatory swing strategies for advancement of R LE.     Reciprocal stair navigation without vaulting of L LE     No falls during POC.     Improvements in strength of R hip, knee and ankle flexion by half a grade.     Subjective reports of improvements in posture.     ABC > 55%    PLAN:  Continue with BFR to R LE    SUBJECTIVE:    \"Peaks and valleys.\"  When cutting the lawn the other day had \"big shock from my head to toes.  Felt like I got struck by lightning.\"      L elbow still really hurts because has to use it for everything.    Went for a walk this morning with dog (1.22 miles).      After last time did report some genu recurvatum on the R but wasn't feeling too bad.    OBJECTIVE:    TREATMENT:    Therapeutic Exercise (16169): 30 minutes    R LE BFR:  - LOP 175mmHg  - PTP 140mmHg     All contraindications and precautions discussed at this and previous appointment.  Performing the following:  Heel Slides -- 30 reps, 3 sets x 15 reps -- significantly fatigued by end of session    White Cable Stack Hip Extensions -- x 10 with 10lbs; 3 sets x 10 with 12.5lbs  -- cues for squeezing glutes on R side. Cues for anterior lean and greater ROM     Discussed BFR for R LE, principles, contraindications, benefits.    Neuromuscular Reeducation (69673):  13 minutes   " "  Foot Taps on 16\" Riser (300usec, 40Hz, 5 on/off, 43mACC) -- performing over 13 minute period (short rest break and set-up time built in.  Performing to encourage NMR of R DF and R hip flexors to carryover into all other functional mobility.         "

## 2025-07-03 ENCOUNTER — HOSPITAL ENCOUNTER (OUTPATIENT)
Dept: RADIOLOGY | Facility: HOSPITAL | Age: 60
Discharge: HOME | End: 2025-07-03
Payer: COMMERCIAL

## 2025-07-03 DIAGNOSIS — G35 MULTIPLE SCLEROSIS (MULTI): ICD-10-CM

## 2025-07-03 PROCEDURE — 2550000001 HC RX 255 CONTRASTS: Performed by: PSYCHIATRY & NEUROLOGY

## 2025-07-03 PROCEDURE — 72156 MRI NECK SPINE W/O & W/DYE: CPT

## 2025-07-03 PROCEDURE — 70553 MRI BRAIN STEM W/O & W/DYE: CPT

## 2025-07-03 PROCEDURE — A9575 INJ GADOTERATE MEGLUMI 0.1ML: HCPCS | Performed by: PSYCHIATRY & NEUROLOGY

## 2025-07-03 RX ORDER — GADOTERATE MEGLUMINE 376.9 MG/ML
19 INJECTION INTRAVENOUS
Status: COMPLETED | OUTPATIENT
Start: 2025-07-03 | End: 2025-07-03

## 2025-07-03 RX ADMIN — GADOTERATE MEGLUMINE 19 ML: 376.9 INJECTION INTRAVENOUS at 11:44

## 2025-07-10 ENCOUNTER — TREATMENT (OUTPATIENT)
Dept: PHYSICAL THERAPY | Facility: CLINIC | Age: 60
End: 2025-07-10
Payer: COMMERCIAL

## 2025-07-10 ENCOUNTER — TREATMENT (OUTPATIENT)
Dept: OCCUPATIONAL THERAPY | Facility: CLINIC | Age: 60
End: 2025-07-10
Payer: COMMERCIAL

## 2025-07-10 DIAGNOSIS — M25.611 DECREASED RANGE OF MOTION OF RIGHT SHOULDER: ICD-10-CM

## 2025-07-10 DIAGNOSIS — R26.89 BALANCE PROBLEM: ICD-10-CM

## 2025-07-10 DIAGNOSIS — R29.898 DECREASED ROM OF NECK: ICD-10-CM

## 2025-07-10 DIAGNOSIS — R27.8 COORDINATION IMPAIRMENT: ICD-10-CM

## 2025-07-10 DIAGNOSIS — G35 MULTIPLE SCLEROSIS (MULTI): Primary | ICD-10-CM

## 2025-07-10 DIAGNOSIS — M54.2 NECK PAIN: ICD-10-CM

## 2025-07-10 DIAGNOSIS — R29.898 DECREASED GRIP STRENGTH OF RIGHT HAND: ICD-10-CM

## 2025-07-10 DIAGNOSIS — R26.9 ABNORMALITY OF GAIT: Primary | ICD-10-CM

## 2025-07-10 PROCEDURE — 97535 SELF CARE MNGMENT TRAINING: CPT | Mod: GO

## 2025-07-10 PROCEDURE — 97112 NEUROMUSCULAR REEDUCATION: CPT | Mod: GO

## 2025-07-10 PROCEDURE — 97110 THERAPEUTIC EXERCISES: CPT | Mod: GP

## 2025-07-10 PROCEDURE — 97140 MANUAL THERAPY 1/> REGIONS: CPT | Mod: GO

## 2025-07-10 ASSESSMENT — ACTIVITIES OF DAILY LIVING (ADL): HOME_MANAGEMENT_TIME_ENTRY: 10

## 2025-07-10 NOTE — PROGRESS NOTES
"Physical Therapy Treatment      Patient Name: Marshall Padilla  MRN: [id number]  Today's Date: 7/10/2025  Visit #: 12  Insurance: 2025 0 COPAY, 50% COINS, 8300 DED, 9200 OOP MAX, 20 VS, NO AUTH REQ   Time Calculation  Start Time: 1034  Stop Time: 1116  Time Calculation (min): 42 min    1. Abnormality of gait        2. Balance problem        3. Decreased ROM of neck        4. Neck pain            ASSESSMENT:  Put off BFR this date per his request.  Focus on posterior chain.  No adverse events, but highly challenged by these activities.  Demoing significant weakness of R LE posterior chain, and attempts to compensate / use other muscles to assist.  Tried to isolate today.  Will continue with this.    GOALS:  By Discharge patient will demo:     Improvement in gait mechanics without accessory / compensatory swing strategies for advancement of R LE.     Reciprocal stair navigation without vaulting of L LE     No falls during POC.     Improvements in strength of R hip, knee and ankle flexion by half a grade.     Subjective reports of improvements in posture.     ABC > 55%    PLAN:  Continue with BFR to R LE and R LE NMR / strengthening.    SUBJECTIVE:    Missed last session d/t significant abdominal discomfort.  Thinks may have been secondary to meals.  L Elbow \"excruciating.\"  L knee is feeling better.  Wants to hold off on R BFR today because has a very busy weekend coming up and doesn't want the fatigue to affect him.  Wants to do HS curl machine today.      OBJECTIVE:    TREATMENT:    Therapeutic Exercise (53267): 42 minutes Performing all of the following to encourage strengthening of deficit areas to      Cybex R Leg Curls -- 30lbs, x 15, warm-up; 50lbs, 2 sets x 12, 1 set x 15); 30lbs (maintained) until fatigue with R LE only -- performing to encourage recruitment of R HS to carryover into other activities.     White Cable Stack Hip Extensions -- 2 sets x 15, 1 set x 12 with 12.5lbs -- cues for squeezing glutes on R side. " Cues for anterior lean and greater ROM     Bridges:  - Bilateral with Red Band -- 3 sets x 10, 3 second holds  - Bridge with L LE March -- 2 sets x 5, highly challenged needing R LE guiding

## 2025-07-10 NOTE — PROGRESS NOTES
Occupational Therapy  Occupational Therapy Treatment Note    Patient Name Marshall Padilla   MRN: [id number]  : 1965  Today's Date: 07/10/25  Time Calculation  Start Time: 1127  Stop Time: 1220  Time Calculation (min): 53 min    Visit #:     [facility]  Authorization required: No  # of visits approved: 2025: $0 COPAY, 50% COINS, $8300 DED, $9200 OOP MAX  20 VS, NO AUTH REQ     Therapy Diagnoses:   1. Multiple sclerosis (Multi)        2. Coordination impairment        3. Decreased range of motion of right shoulder        4. Decreased  strength of right hand          Assessment:  Pt demo continued improvement with tolerance of RUE single-arm push-ups against wall, able to complete inc consecutive reps. Pt continued to require mod physical assist by OT to maintain RUE positioning against wall to optimize WB and body mechanics. Pt with continued mild tone throughout RUE, and worsening tingling/shooting pain throughout RUE with functional use. Pt with continued overuse symptoms in LUE. Pt will benefit from continued skilled OT to learn task modifications, joint protection, adaptations for daily occupations that are important to him, as well as continue neuromuscular reeducation to promote functional use of RUE to optimize ADL/IADL safety and functioning.     Plan:      Continue to progress per POC: Continue OT 1x every other week for 10 visits, then re-assess.    Interventions: Neuromuscular reeducation, therapeutic activities, therapeutic exercise, self-care/ADL, manual therapy    Goals:  By discharge,      Pt will demo independence with HEP completion.  Pt will demo and verbalize use of energy conservation/joint protection techniques to increase activity tolerance while completing ADL/IADL tasks.   Pt will maintain R  strength +/- 10# to facilitate independence with ADLs/IADLs.  Pt will demo increased functional use of RUE, as indicated by the QuickDash score; current score is  "37.  Pt will maintain R FMC by keeping time on 9 Hole Peg test within +/- 5 seconds, to maximize independence with ADLs/IADLs.  Pt will demo improved RUE motor control and dec spasticity with use of compensatory strategies during daily activities.  Pt will demo self-care/ADL/IADL compensation/adaptation use to promote functional independence.     Subjective:   Pt reports he canceled on 6/26 due to L side pain lasting for several hours, was considering going to the ER, but then it subsided (said it was up to 8/10 on pain scale). Pt reports the pain came on again fourth of July but was not as severe and resolved more quickly; pt suspects it could be dietary related as he had eaten a lot of red meat. Pt has upcoming gastro follow-up and colonoscopy. Pt reports R-single arm push-ups last tx session were a \"killer exercise\" with soreness for 3-4 days after, but a good workout. Pt used dycem to maintain  on exercise bar with good effect with R hand at home. Pt to see MS doctor next week.     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 Other hx: lower back sx, disc herniation s/p C4-5 and C5-6 anterior fusion (2017), posterior cervical fusion at 3 levels (7/11/22), pt has trialed botox for RUE (\"with bad effects\"). Pt has long history of OP Rehab at this clinic, seeing Luis A for PT and Wang for OT.     Have you fallen since last visit: No    Precautions: low fall risk (near-fall on deck late-May 2025)    Pain:  3/10 constant, spikes to 6-7/10 at times  Location/Type of pain:  RUE, tingling/burning in hand    HEP compliance/understanding: Intermittent compliance    Objective:   Mod physical assist by OT to maintain RUE positioning for single-arm wall push-up    Strength: pt able to tolerate 1x10 wall push-ups with RUE (with OT maintaining RUE positioning)     Tone: mild tone throughout " RUE    Sensation: worsening tingling/shooting pain throughout RUE with functional use    Treatment:     Manual Therapy:  30 minutes  OT provided R shoulder mobilizations posterior and inferior (grades 1-4) with pt in supine, good tolerance  OT provided slow progressive stretch/PROM to RUE in all planes with pt in supine    Self Care Home Management: 10 minutes  Continued integration of body mechanics/joint protection strategies for LUE at home to reduce L elbow pain & functional WB strategies for RUE    Neuromuscular Re-education: 13 minutes  Progressed wall push-ups: 1x10 BUE, 1x10 RUE only     Education: review of body mechanics/joint protection strategies (LUE) and WB strategies for RUE activation    HEP Progression: N/A; continue current program.     Current HEP:  - Deep/firm touch pressure to RUE for sensory re-education  - RUE WB during daily tasks  - RUE positioning considerations  - LUE wrist isometrics  - Edgardo shoulder retraction and elevation/shrugs  - Cane/dowel/weighted bar AAROM ex (seated shoulder ER, supine shoulder ER/IR), seated shoulder ER PROM on table, table slides (flex/abd), shoulder ER/IR with anchored resistance (Access Code: [email or web address]; 5/6/25)  - Pt to resume use of  strengthener at home daily  - Cane/dowel/weighted bar (up to 10#) ex in supine: shoulder flexion/ex and chest press  - Uses  strengthener  - RUE side-to-side WB in sitting EOB/edge of couch - 3x30 sec holds with distinguished RB in between  - Standing push-ups BUEs and RUE only with focus on achieving full R elbow extension and maintaining R hand position against wall

## 2025-07-15 ENCOUNTER — APPOINTMENT (OUTPATIENT)
Dept: INFUSION THERAPY | Facility: CLINIC | Age: 60
End: 2025-07-15
Payer: COMMERCIAL

## 2025-07-15 VITALS
RESPIRATION RATE: 18 BRPM | SYSTOLIC BLOOD PRESSURE: 110 MMHG | HEART RATE: 68 BPM | DIASTOLIC BLOOD PRESSURE: 71 MMHG | TEMPERATURE: 97.2 F | OXYGEN SATURATION: 98 %

## 2025-07-15 DIAGNOSIS — Z79.899 DRUG-INDUCED IMMUNODEFICIENCY (MULTI): ICD-10-CM

## 2025-07-15 DIAGNOSIS — G35 MULTIPLE SCLEROSIS (MULTI): ICD-10-CM

## 2025-07-15 DIAGNOSIS — D84.821 DRUG-INDUCED IMMUNODEFICIENCY (MULTI): ICD-10-CM

## 2025-07-15 LAB
BASOPHILS # BLD AUTO: 0.06 X10*3/UL (ref 0–0.1)
BASOPHILS NFR BLD AUTO: 1.5 %
EOSINOPHIL # BLD AUTO: 0.19 X10*3/UL (ref 0–0.7)
EOSINOPHIL NFR BLD AUTO: 4.7 %
ERYTHROCYTE [DISTWIDTH] IN BLOOD BY AUTOMATED COUNT: 12.8 % (ref 11.5–14.5)
HCT VFR BLD AUTO: 39.4 % (ref 41–52)
HGB BLD-MCNC: 13.2 G/DL (ref 13.5–17.5)
IGA SERPL-MCNC: 37 MG/DL (ref 70–400)
IGG SERPL-MCNC: 478 MG/DL (ref 700–1600)
IGM SERPL-MCNC: 39 MG/DL (ref 40–230)
IMM GRANULOCYTES # BLD AUTO: 0 X10*3/UL (ref 0–0.7)
IMM GRANULOCYTES NFR BLD AUTO: 0 % (ref 0–0.9)
LYMPHOCYTES # BLD AUTO: 1.16 X10*3/UL (ref 1.2–4.8)
LYMPHOCYTES NFR BLD AUTO: 28.8 %
MCH RBC QN AUTO: 31.4 PG (ref 26–34)
MCHC RBC AUTO-ENTMCNC: 33.5 G/DL (ref 32–36)
MCV RBC AUTO: 94 FL (ref 80–100)
MONOCYTES # BLD AUTO: 0.33 X10*3/UL (ref 0.1–1)
MONOCYTES NFR BLD AUTO: 8.2 %
NEUTROPHILS # BLD AUTO: 2.29 X10*3/UL (ref 1.2–7.7)
NEUTROPHILS NFR BLD AUTO: 56.8 %
NRBC BLD-RTO: 0 /100 WBCS (ref 0–0)
PLATELET # BLD AUTO: 207 X10*3/UL (ref 150–450)
RBC # BLD AUTO: 4.2 X10*6/UL (ref 4.5–5.9)
WBC # BLD AUTO: 4 X10*3/UL (ref 4.4–11.3)

## 2025-07-15 PROCEDURE — 88185 FLOWCYTOMETRY/TC ADD-ON: CPT

## 2025-07-15 PROCEDURE — 96375 TX/PRO/DX INJ NEW DRUG ADDON: CPT | Performed by: NURSE PRACTITIONER

## 2025-07-15 PROCEDURE — 96413 CHEMO IV INFUSION 1 HR: CPT | Performed by: NURSE PRACTITIONER

## 2025-07-15 PROCEDURE — 82784 ASSAY IGA/IGD/IGG/IGM EACH: CPT

## 2025-07-15 PROCEDURE — 96415 CHEMO IV INFUSION ADDL HR: CPT | Performed by: NURSE PRACTITIONER

## 2025-07-15 PROCEDURE — 88187 FLOWCYTOMETRY/READ 2-8: CPT | Performed by: PSYCHIATRY & NEUROLOGY

## 2025-07-15 PROCEDURE — 88184 FLOWCYTOMETRY/ TC 1 MARKER: CPT

## 2025-07-15 PROCEDURE — 85025 COMPLETE CBC W/AUTO DIFF WBC: CPT

## 2025-07-15 RX ORDER — ACETAMINOPHEN 325 MG/1
650 TABLET ORAL ONCE
OUTPATIENT
Start: 2025-07-17

## 2025-07-15 RX ORDER — ALBUTEROL SULFATE 0.83 MG/ML
3 SOLUTION RESPIRATORY (INHALATION) AS NEEDED
OUTPATIENT
Start: 2025-07-17

## 2025-07-15 RX ORDER — DIPHENHYDRAMINE HCL 25 MG
25 CAPSULE ORAL ONCE
OUTPATIENT
Start: 2025-07-17

## 2025-07-15 RX ORDER — DIPHENHYDRAMINE HYDROCHLORIDE 50 MG/ML
50 INJECTION, SOLUTION INTRAMUSCULAR; INTRAVENOUS AS NEEDED
OUTPATIENT
Start: 2025-07-17

## 2025-07-15 RX ORDER — FAMOTIDINE 10 MG/ML
20 INJECTION, SOLUTION INTRAVENOUS ONCE AS NEEDED
OUTPATIENT
Start: 2025-07-17

## 2025-07-15 RX ORDER — DIPHENHYDRAMINE HCL 25 MG
25 CAPSULE ORAL ONCE
Status: COMPLETED | OUTPATIENT
Start: 2025-07-15 | End: 2025-07-15

## 2025-07-15 RX ORDER — EPINEPHRINE 0.3 MG/.3ML
0.3 INJECTION SUBCUTANEOUS EVERY 5 MIN PRN
OUTPATIENT
Start: 2025-07-17

## 2025-07-15 RX ORDER — ACETAMINOPHEN 325 MG/1
650 TABLET ORAL ONCE
Status: COMPLETED | OUTPATIENT
Start: 2025-07-15 | End: 2025-07-15

## 2025-07-15 RX ADMIN — Medication 25 MG: at 10:20

## 2025-07-15 RX ADMIN — ACETAMINOPHEN 650 MG: 325 TABLET ORAL at 10:20

## 2025-07-15 ASSESSMENT — ENCOUNTER SYMPTOMS
BRUISES/BLEEDS EASILY: 0
ABDOMINAL PAIN: 0
SORE THROAT: 0
NECK PAIN: 1
MYALGIAS: 1
FREQUENCY: 0
DIZZINESS: 0
VOMITING: 0
TROUBLE SWALLOWING: 0
HEMATURIA: 0
APPETITE CHANGE: 0
LEG SWELLING: 0
PALPITATIONS: 0
FEVER: 0
EYE PROBLEMS: 0
NECK STIFFNESS: 1
VOICE CHANGE: 0
DIARRHEA: 0
CHILLS: 0
WOUND: 0
DYSURIA: 0
NAUSEA: 0
LIGHT-HEADEDNESS: 0
WHEEZING: 0
COUGH: 0
BLOOD IN STOOL: 0
SHORTNESS OF BREATH: 0
CONSTIPATION: 0
ARTHRALGIAS: 1
EXTREMITY WEAKNESS: 1
HEADACHES: 0
FATIGUE: 1
NUMBNESS: 1
UNEXPECTED WEIGHT CHANGE: 0

## 2025-07-15 NOTE — PATIENT INSTRUCTIONS
Today you received:   ocrelizumab (Ocrevus) 600 mg in sodium chloride 0.9% 520 mL IV       NEXT APPT 6 MONTHS    For:    1. Drug-induced immunodeficiency (Multi)    2. Multiple sclerosis (Multi)            Please read the  Medication Guide that was given to you and reviewed during todays visit.     (Tell all doctors including dentists that you are taking this medication)     Go to the emergency room or call 911 if:  -You have signs of allergic reaction:   o         Rash, hives, itching.   o         Swollen, blistered, peeling skin.   o         Swelling of face, lips, mouth, tongue or throat.   o         Tightness of chest, trouble breathing, swallowing or talking      Call your doctor:     - If IV / injection site gets red, warm, swollen, itchy or leaks fluid or pus.     (Leave dressing on your IV site for at least 2 hours and keep area clean and dry    - If you get sick or have symptoms of infection or are not feeling well for any reason.    (Wash your hands often, stay away from people who are sick)    - If you have side effects from your medication that do not go away or are bothersome.     (Refer to the teaching your nurse gave you for side effects to call your doctor about)     Common side effects may include:  stuffy nose, headache, feeling tired, muscle aches, upset stomach    - Before receiving any vaccines, Call the [facility] at [phone]     - You get sick, are on antibiotics, have had a recent vaccine, have surgery or dental work and your doctor wants your visit rescheduled.    - You need to cancel and reschedule your visit for any reason. Call at least 2 days before your visit if you need to cancel.     - Your insurance changes before your next visit.    (We will need to get approval from your new insurance. This can take up to two weeks.)     The [facility] is opened Monday thru Friday 8am-8pm and Saturday 8am-4:30pm. We are closed on holidays.     Voice mail will  take your call if the center is closed. If you leave a message please allow 24 hours for a call back during weekdays. If you leave a message on a weekend/holiday, we will call you back the next business day.

## 2025-07-15 NOTE — PROGRESS NOTES
[facility]   Infusion Clinic Note   Date: July 15, 2025   Name: Marshall Padilla  : 1965   MRN: [id number]         Reason for Visit: OP Infusion (ocrelizumab (Ocrevus) 600 mg in sodium chloride 0.9% 520 mL IV  Q 6 MONTHS)         Today: We administered acetaminophen, methylPREDNISolone sod succinate, diphenhydrAMINE, and ocrelizumab (Ocrevus) 600 mg in sodium chloride 0.9% 520 mL IV.       Ordered By: Vern Patel MD       For a Diagnosis of: Drug-induced immunodeficiency (Multi)    Multiple sclerosis (Multi)       At today's visit patient accompanied by: Self      Today's Vitals:   Vitals:    07/15/25 0959 07/15/25 1126 07/15/25 1159 07/15/25 1305   BP: 122/75 115/75 120/60 110/71   Pulse: 78 78 62 68   Resp: 18 18 18 18   Temp: 36.2 °C (97.2 °F) 36.1 °C (97 °F)  36.2 °C (97.2 °F)   SpO2: 98% 98% 98% 98%             Pre - Treatment Checklist:      - Previous reaction to current treatment: no      (Assess patient for the concerns below. Document provider notification as appropriate).  - Active or recent infection with/without current antibiotic use: no  - Recent or planned invasive dental work: no  - Recent or planned surgeries: no  - Recently received or plans to receive vaccinations: no  - Has treatment related toxicities: no  - Any chance may be pregnant:  n/a      Pain: 5   - Is the pain different from normal: no   - Is prescribing Doctor aware:  yes      Labs: Labs drawn and sent per order      Fall Risk Screening: Thayer Fall Risk  History of Falling, Immediate or Within 3 Months: No  Secondary Diagnosis: Yes  Ambulatory Aid: Walks without aid/bedrest/nurse assist  Intravenous Therapy/Heparin Lock: Yes  Gait/Transferring: Normal/bedrest/immobile  Mental Status: Oriented to own ability  Thayer Fall Risk Score: 35            Review Of Systems:  Review of Systems   Constitutional:  Positive for fatigue. Negative for appetite change, chills, fever and unexpected weight change.   HENT:  "  Negative for hearing loss, mouth sores, sore throat, tinnitus, trouble swallowing and voice change.    Eyes:  Negative for eye problems.   Respiratory:  Negative for cough, shortness of breath and wheezing.    Cardiovascular:  Negative for chest pain, leg swelling and palpitations.   Gastrointestinal:  Negative for abdominal pain, blood in stool, constipation, diarrhea, nausea and vomiting.   Genitourinary:  Negative for dysuria, frequency and hematuria.    Musculoskeletal:  Positive for arthralgias, myalgias, neck pain and neck stiffness.   Skin:  Negative for itching, rash and wound.   Neurological:  Positive for extremity weakness (RIGHT SIDE WEAKNESS, STIFFNESS IN RT HAND, RT FOOT \"FLOPPY\" PER PT.) and numbness (RIGHT UPPER AND LOWER EXTREMITY). Negative for dizziness, headaches and light-headedness.   Hematological:  Does not bruise/bleed easily.         Infusion Readiness:  - Assessment Concerns Related to Infusion: No  - Provider notified: no      New Patient Education:    N/A (returning patient for continuation of therapy. Ongoing education provided as needed.)        Treatment Conditions & Drug Specific Questions:    Ocrelizumab  (OCREVUS)    (Unless otherwise specified on patient specific therapy plan):     TREATMENT CONDITIONS:  Unless otherwise specified on patient specific therapy plan HOLD and notify provider prior to proceeding with today's infusion if patient with:  o Positive Hepatitis B Surface Ag or panel  o Positive T-Spot    No results found for: \"TBSIN\", \"TBGRES\", \"QFG\", \"TSPOTR\"   No results found for: \"HAGCN\", \"HEPBSURABI\", \"HBSAG\", \"XHAGF\", \"HEPBSAG\", \"NONUHSWGH\"   No results found for: \"HEPATOT\", \"HEPAIGM\", \"HEPBCIGM\", \"HEPBCAB\", \"HBEAG\", \"HEPCAB\"   No results found for: \"NONUHFIRE\", \"NONUHSWGH\", \"NONUHFISH\"    Patient meets treatment conditions? Yes    DRUG SPECIFIC QUESTIONS:  Any signs or symptoms of Progressive multifocal leukoencephalopathy (PML) which may include: memory loss, " trouble thinking, dizziness, loss of balance, difficulty talking / walking or loss of vision? No    (If YES HOLD and notify provider)      REMINDER:  Recommended Vitals/Observation:  Vitals: Obtain vital signs every 30 minutes / with each ramp up. Once maximum rate is reached obtain vitals hourly until infusion is complete. Obtain vitals at end of observation period and PRN.  Observation: Observe patient for 60 minutes after each infusion. Patient declined to stay for observation. Educated on signs and symptoms to monitor for and when to present to the ED         Weight Based Drug Calculations:    WEIGHT BASED DRUGS: NOT APPLICABLE / FLAT DOSE       Post Treatment: Patient tolerated treatment without issue and was discharged in no apparent distress.      Note Authored / Patient Cared for By: Sherry Tanner RN

## 2025-07-17 ENCOUNTER — OFFICE VISIT (OUTPATIENT)
Dept: NEUROLOGY | Facility: HOSPITAL | Age: 60
End: 2025-07-17
Payer: COMMERCIAL

## 2025-07-17 VITALS
HEIGHT: 75 IN | BODY MASS INDEX: 25.86 KG/M2 | HEART RATE: 60 BPM | RESPIRATION RATE: 18 BRPM | DIASTOLIC BLOOD PRESSURE: 75 MMHG | SYSTOLIC BLOOD PRESSURE: 150 MMHG | TEMPERATURE: 98 F | WEIGHT: 208 LBS

## 2025-07-17 DIAGNOSIS — G35 MULTIPLE SCLEROSIS (MULTI): Primary | ICD-10-CM

## 2025-07-17 DIAGNOSIS — R25.2 CRAMP AND SPASM: ICD-10-CM

## 2025-07-17 PROCEDURE — 3008F BODY MASS INDEX DOCD: CPT | Performed by: PSYCHIATRY & NEUROLOGY

## 2025-07-17 PROCEDURE — 99417 PROLNG OP E/M EACH 15 MIN: CPT | Performed by: PSYCHIATRY & NEUROLOGY

## 2025-07-17 PROCEDURE — 99215 OFFICE O/P EST HI 40 MIN: CPT | Performed by: PSYCHIATRY & NEUROLOGY

## 2025-07-17 PROCEDURE — 1036F TOBACCO NON-USER: CPT | Performed by: PSYCHIATRY & NEUROLOGY

## 2025-07-17 RX ORDER — GABAPENTIN 600 MG/1
600 TABLET ORAL 3 TIMES DAILY
Qty: 270 TABLET | Refills: 3 | Status: SHIPPED | OUTPATIENT
Start: 2025-07-17 | End: 2026-07-17

## 2025-07-17 RX ORDER — BACLOFEN 20 MG/1
40 TABLET ORAL 2 TIMES DAILY
Qty: 360 TABLET | Refills: 3 | Status: SHIPPED | OUTPATIENT
Start: 2025-07-17

## 2025-07-17 ASSESSMENT — PAIN SCALES - GENERAL: PAINLEVEL_OUTOF10: 0-NO PAIN

## 2025-07-17 NOTE — PATIENT INSTRUCTIONS
I will increase gabapentin to 600 mg three times a day to address arm pain.     Your blood work showed low Igg and mild reduction of cell counts, please report any serious infections if they happen in the future.     Please continue vitamin D, baclofen, tizanidine same dose.     Please continue PT/OT. Please discuss a trial of bioness for the right arm with your therapist.     Please discuss the new bladder symptoms with your urologist.     I will look into a trial of dalfampridine for you.     Follow up after six months.     Thank you for visiting the clinic today    Vern Patel MD

## 2025-07-17 NOTE — PROGRESS NOTES
Chief Complaint  PPMS.      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 known: PPMS with activity  Date of onset: Incidentally found 6/2017, clinical onset 3/2018  Date of diagnosis: 10/19/2017 radiological diagnosis, 05/15/2018 clinical diagnosis  disease course at onset: progressive relapsing   disease course now: progressive relapsing   Current DMT: Ocrevus since June 2018  Previous DMT: none   Last MRI brain: 7/2025  Last MRI cervical: 7/2025  Last MRI thoracic: 11/2023  Last VEP: negative in 8/2017  CSF: done 9/2017 normal CSF, negative OCBs and IGG index  JCV: positive 0.6 9/2017  VZV: positive 9/2017  HEP panel: negative 9/2017  NMO: negative 9/2017     This is a 59 y.o. right handed White male patient who presented initially with right arm pain in the setting of significant C5-6 disc prolapse with radiculomyelopathy and an incidental finding of an unrelated demyelinating -like plaque at the CMJ. brain MRI showed only non specific deep white matter hyperintense lesions. He underwent a successful cervical decompression surgery with Dr. Willis in October 2017 and his RUE improved. LP was negative for MS markers but he started having progressive RLE weakness in March 2018. Repeat imaging showed a new enhancing lesion at T4 on the right side. A diagnosis of PPMS with activity and progression was made.      INTERVAL HISTORY:  Continues with progressive weakness, atrophy, and pain in the RUE. MRI brain and cervical this month are stable without new lesions or any new mechanical neck issues. IgG in the upper 400's now and he also has mild leukopenia and lymphopenia but he reports no infections.      MS symptom review:     weakness: yes RLE and RUE   sensory changes: yes presenting symptom, RUE  incoordination: no  falling: no  gait change: yes, limp  painful vision loss: no  double vision: no  vertigo: no  facial/bulbar weakness: no but had  facial numbness and twitching, resolved  Lhermitte's: no  bladder/bowel dysfunction: urinary frequency      spasticity: yes, right leg now feels heavier and circumducts while walking, improved after baclofen.   tonic spasms: yes RUE isometric painful triggered by lying down at night. almost daily.   tremors: no  RLS: no  dystonia: no  other movement disorders: no     heat sensitivity: yes especially during work ()   fatigue: yes  depression: no  anxiety: yes major   cognitive changes: nothing major     DMT: Ocrevus as of 6/2018  Side effects: low IgG     Vitamin D: 5000 units daily  symptomatic: baclofen 40 mg, helpful. Gabapentin. Amantadine is prescribed but he never took it. Botox started on 8/29/2024 caused worsening weakness.               Review of Systems  All systems reviewed and are negative except as mentioned in the HPI.        Active Problems  Problems    · Abnormal MRI, cervical spine (793.7) (R93.7)   · Abnormality of gait (781.2) (R26.9)   · Arm pain (729.5) (M79.603)   · right   · Balance problem (781.99) (R26.89)   · Cervical disc disease with myelopathy (722.71) (M50.00)   · Cervical myelopathy (721.1) (G95.9)   · Cervical radiculopathy (723.4) (M54.12)   · Chronic sinusitis (473.9) (J32.9)   · Decreased coordination (781.3) (R27.8)   · Decreased ROM of neck (723.8) (R29.898)   · Decreased strength, endurance, and mobility (780.79,780.99) (R53.1,R68.89,Z74.09)   · Depression with anxiety (300.4) (F41.8)   · Dizziness (780.4) (R42)   · Encounter for fitness for duty examination (V70.5) (Z02.89)   · Grippy cold (487.1) (J11.1)   · Immunosuppression (279.9) (D84.9)   · Multiple sclerosis, primary progressive (340) (G35)   · Myelitis (323.9) (G04.91)   · Neck pain (723.1) (M54.2)   · Neck stiffness (723.5) (M43.6)   · Numbness of arm (782.0) (R20.0)   · Otitis externa (380.10) (H60.90)   · Pain of right upper extremity (729.5) (M79.601)   · Paresthesia of arm (782.0) (R20.2)   ·  Paresthesias/numbness (782.0) (R20.9)   · Pins and needles sensation (782.0) (R20.2)   · right arm   · Right arm weakness (729.89) (R29.898)   · Right-sided muscle weakness (728.87) (M62.81)   · S/P cervical spinal fusion (V45.4) (Z98.1)   · ACDF 2017   · Sensation problem (782.0) (R20.9)   · Shingles (053.9) (B02.9)   · Shoulder pain, bilateral (719.41) (M25.511,M25.512)   · Spasticity (781.0) (R25.2)   · Temporal headache (784.0) (R51.9)   · Vitamin D deficiency (268.9) (E55.9)     Past Medical History  Problems    · No pertinent past medical history     Surgical History  Problems    · History of Lower Back Surgery   · History of Posterior cervical vertebral fusion   · 7/11/22     Family History  Mother    · No pertinent family history  Father    · Family history of hyperlipidemia (V18.19) (Z83.438)   · Family history of hypertension (V17.49) (Z82.49)  Aunt    · Family history of multiple sclerosis (V17.2) (Z82.0)     Social History  Problems    · Born in [place]   · Completed elementary school   · Completed high school   · Education Level: Some college   · Employed   · Has been a  since March of 1989.   ·    · Never a smoker   · Number of children   · Has a 13 and 17 year old sons.   · Occasional alcohol use     Allergies  Medication    · No Known Drug Allergies   Recorded By: James Mcfarland; 12/17/2015 9:39:57 AM        Physical Exam  GENERAL APPEARANCE: No distress, alert, interactive and cooperative.      CARDIOVASCULAR: Regular, rate and rhythm, no murmurs, normal S1 and S2. Pulses +2 and equal in all extremities. No swelling, varicosities, edema, or tenderness to palpation.      MENTAL STATE: Orientation was normal to time, place and person. Recent and remote memory was intact. Attention span and concentration were normal. Language testing was normal for comprehension, naming, repetition and expression. General fund of knowledge was intact. Able to spell WORLD forwards and backwards.      OPHTHALMOSCOPIC: nl cup:disc ratio bilaterally     CRANIAL NERVES:   CN 2 Visual fields full to confrontation.   CN 3, 4, 6 Pupils round, 4 mm in diameter, equally reactive to light. Lids symmetric; no ptosis. EOMs normal alignment, full range with normal saccades, pursuit and convergence. No nystagmus.   CN 5 Facial sensation intact bilaterally to light touch & pinprick  CN 7 Normal and symmetric facial strength. Nasolabial folds symmetric.   CN 8 Hearing intact to conversation.  CN 9 Palate elevates symmetrically.   CN 11 Normal strength of shoulder shrug.  CN 12 Tongue midline, with normal bulk and strength; no fasciculations.      MOTOR: Muscle bulk was normal and tone was normal in both upper and lower extremities. No adventitious movements.      R L  Delt 5 5  Bicep 4+ 5  Tricep 4- 5   Wrist Flex 4 5   Wrist Ext 4+ 5  ADM 4 5     Hip Flex 4+ 5  Hip Add 5 5  Leg Ext 4 5  Leg Flex 4 5  DF 4- 5  PF 4 5      REFLEXES:   RIGHT UE LEFT UE   BR:2   BR:2   Biceps:2 Biceps:2   Triceps:2 Triceps:2      RIGHT LLE LEFT LLE   Knee:3+            Knee:3+   Ankle:3 Ankle:3      Bilateral Hood with R>L. No clonus, frontal release signs or other pathologic reflexes present.      SENSORY: Sensory exam was normal. In both upper and lower extremities, sensation was intact to light touch; sharp/dull.     COORDINATION: Coordination exam was normal. In both upper extremities, finger-nose-finger was intact without dysmetria or overshoot. In both lower extremities, heel-to-shin was intact. MITCH intact bilaterally. Fine finger movement intact.     GAIT: Gait was abnormal. Station was stable with a normal base. Gait was stable with a decreased rigth arm swing and speed. No ataxia, shuffling or waddling was present. R circumduction w/ slight steppage was present. Tandem gait was intact. No Romberg sign was present.     Multiple sclerosis Functional Composite (MSFC):  25 foot walk: 4.2 s 4.3 s compared to 4.4 s compared to 3.6 s  compared to 3.5 s compared to 3.5 s 3.9 s 4.3 s compared to 3.6 s compared to 4.6 s compared to 3.2 s in the last visit.      9-PEG-WHOLE:      Right not done compared to 37.8 s  Left    not done compared to 18.1 s                                            Estimated EDSS: 3 up from 2 up from 1 initially   Constitutional: General appearance: no acute distress   Mental status: The patient was in no distress, alert, interactive and cooperative. Affect is appropriate.   Orientation: oriented to person,~oriented to place~and~oriented to time.   Memory: recent memory intact~and~remote memory intact.   Attention: normal attention span~and~normal concentrating ability.   Language: normal comprehension~and~no difficulty naming common objects.   Fund of knowledge: Patient displays adequate knowledge of current events,~adequate fund of knowledge regarding past history~and~adequate fund of knowledge regarding vocabulary.   Cranial nerve II: Visual fields full to confrontation.~no red desaturation, no RAPD.   Cranial nerves III, IV, and VI: Pupils round, equally reactive to light; no ptosis. EOMs intact. No nystagmus.~no red desaturation.   Cranial Nerve V: Facial sensation intact bilaterally.   Cranial nerve VII: Normal and symmetric facial strength.   Cranial nerve VIII: Hearing is intact bilaterally to finger rub / whisper.   Cranial nerves IX and X: Palate elevates symmetrically.   Cranial nerve XI: Shoulder shrug and neck rotation strength are intact.   Cranial nerve XII: Tongue midline with normal strength.   Motor: Muscle bulk was normal in both upper and lower extremities.~Abnormal.~grade 1+ spasticity in the RLE with 3 to 4 beats of unsustained knee and ankle clonus. RUE tone may be sightly increased as well.~Abnormal.~RLE 4+/5 with partial right foot drop.~no abnormal or adventitious movements were present.   Deep Tendon Reflexes: left biceps 3+,~right biceps 3+,~left triceps 2+,~right triceps 2+,~left brachioradialis  4+,~right brachioradialis 4+,~left patella 4+,~right patella 3+,~left ankle jerk 4+,~right ankle jerk 3+   Plantar Reflex: Toes downgoing to plantar stimulation on the left.~Toes upgoing to plantar stimulation on the right.   Coordination: There is no limb dystaxia and rapid alternating movements are intact.   Gait:. right circumduction gait.      Scores and Scales     Pyramidal Functions: (3) Minimal disability.   Cerebellar Functions: (0) Normal.   Brainstem Functions: (0) Normal.      Sensory Function: (0) Normal.       Bowel and Bladder Function: (1) Normal.      Visual Function: (0) Normal.         Cerebral (or Mental) Functions: (0) Normal.   EDSS - Expanded Disability Status: 3.0 -       Provider Impressions       Assessment:  This is a 59 y.o.  White right handed male with PMH of lumbar disc disease and a family hx of MS who presented initially in August 2017 with right arm pain and subjective weakness. The neurological exam was positive for generalized pathological hyperreflexia. I have personally reviewed the brain MRIs from 6/2017 which showed a few non-enhancing T2 hyperintense lesions mainly in the deep white matter without any true periventricular, juxtacortical, or infratentorial lesions. I personally reviewed his cervical MRI which showed C5-6 disc herniation with cord compression and myelomalecia. In addition, it showed a short non-enhancing demyelinating-like lesion at the cervicomedullary junction. CSF was normal including negative OCB and IGG index. VEP was normal. MS mimics ruled out including NMO. He underwent a successful cervical decompression surgery in 10/2017 with subsequent improvement in RUE symptoms. He was initially thought to have an incidental asymptomatic small area of demyelination in the upper cervical cord without evidence of dissemination in space or time and no markers of recurrence or disease progression. He was therefore deemed appropriate for monitoring off DMT.      05/14/2018: started having progressive RLE weakness, heaviness, and partial foot drop. Exam revealed new RLE weakness and mild spasticity.   05/15/2018 I personally reviewed his new MRI and compared them to the MRIs from 2017. MRI brain did not show active or new lesions compared to 2017. MRI cervical showed improved cord compression at the site of surgery and stable upper CMJ lesion (to my eyes looks somewhat bigger). thoracic MRI showed a new enhancing lesion at T4 on the right side. This confirms PPMS with activity. Patient is indicated for steroid therapy and Ocrevus.      11/20/2018: new transient left facial numbness and twitching but MRI negative for any new or active lesions in the brain or spinal cord. Likely pseudorelapse from a previous unnoticed relapse. tolerating acrevus well. baclofen helpful.      06/12/2019: MRI stable, worsening fatigue and spasms. Will start amantadine and increase baclofen.      12/03/2019: Some worsening of pre-existing symptoms in the RUE and RLE but exam stable or even improved compared to before. cervical XRAY showed some osseous bridging at the surgical site and Dr. Willis is considering a revision. The new symptoms could be expected fluctuation of MS symptoms or recurrent cord compression at the previous surgical site.      06/09/2020: He feels that his right leg is getting weaker but his exam and 25-f-w are actually stable to improved compared to prior exams. He works as a  and is frequently exposed to COVID-19 patients but with full PPE. His brain and cervical MRIs from May did not show any changes compared to the prior MRI from last year. Will get Ig and CD19 levels. I advised against exposure to COVID-19 patients while on ocrevus.      12/09/2020: He feels that his right leg is still getting weaker but exam is stable to improved.. he tried a bioness but felt that he's not ready for it yet and the therapists agreed as well. His labs from June showed only  slight lymphopenia and minor reduction in Igg. he saw Dr. Willis who had no further recs regarding his cervical myelopathy. Ocrevus is going well. Baclofen is helpful at the higher dose.     06/03/2021: He feels that his right arm and leg are still getting weaker and number. His right arm is also painful. It is hard for him now to use the right hand for cooking, playing basketball, and even driving. Exam remaisn stable with excellent walking speed. MRI brain and cervical stable. his latest labs still showed mild reduction of IgG and ALC. He took his first COVID vaccine in December and the second in January. He had received his ocrevus dose shortly before his first vaccine dose in December. will increase baclofen and gabapentin. will check labs including spike protein.      01/17/2022: continues with RUE pain and numbness and is getting a second opinion with another spine specialist. No change in his known RLE weakness and gait fatigue. He had sever left flank pain after the latest ocrevus infusion and went to the ER. No kidney stone was found. He became constipated later after he was given oxycodone for pain, now improving. He received COVID booster in October but still tested negative for the COVID19 spike antibody. Labs from [facility] still show mild reduction in ALC and IgG.      08/22/2022: worsening sensorimotor deficits over right C6/7 distribution prompted posterior decompression for cervical myelopathy. Post-op improvement in RUE symptoms. Recently hit head -> post-traumatic BPPV. Seen in [facility] in ED, improving recently. No new MS symptoms and walking speed stable. MRI stable without new or active lesions. labs acceptable with only slight reduction in ALC and IGG. He pushed his next ocrevus to January for deductible purposes, which is fine as he never had early B cell repletion.      06/26/2023: He did not pass the latest physical for work and had to retire. been frustrated about that. right leg is getting  stiffer and right arm getting harder to use despite the transient improvement last year after cervical repeat surgery. no infections. blood work with stable mild reduction in IgG and ALC. MRI done at an OSH due to insurance issues and the CD he brought in was empty. 25-f-w is stable but gait is more spsatic. will increase baclofen to 30 mg BID.     1/18/2024: After Dr. Willis left , he went to [facility] for evaluation of progressive RUE weakness and new dystonic flexion of the right fingers. He was seen by neurosurgery and had repeat spine MRIs in November that were stable. He hand an EMG done that showed no evidence of ulnar neuropathy or motor neuron disease. He saw Dr. Hopper at the [facility] who agreed with ocrevus treatment and referred him to the spasticity clinic where he was deemed unsuitable for botox for spasticity. He was treated empirically with high dose IV steroids followed by prednisone taper at the [facility] with subsequent improvement in foot lift and gait but without effect on his RUE. He then had an URTI that required antibiotic therapy. He had an xray that was negative for PNA but showed some abdominal hyperdensity that he is undergoing CT abdomen for soon. He is noticing a change in his trunk/abdomen shape and a leaning towards the right when walking. His last ocrevus labs from last week showed a decrease in IgG level but normal ALC. On exam he does have new grade 1-2 spasticity in the right fingers with mild dynamic dystonic flexion of the fingers. I think he can be a good candidate for a low dose botox to the finger flexors (25 FDS and 25 FDP) with the caveat of potentially causing finger weakness. He wants to think about it and let me know. His RUE weakness and spasticity are clearly directly related to his double cervical pathology including demyelination and compressive myelopathy. I don't think it is necessary to look for additional causes. He has PPMS and his disease is expected to progress  despite ocrevus. He may need IVIG replacement in the future if he develops serious infections. Although his favorable response to pulse steroid therapy is encouraging, we will have to be conservative with steroids given his increased risk of infection in the setting of ocrevus and iatrogenic hypogammaglobulinemia.     7/16/2024: Continues to struggle with worsening spasticity of the right hemibody including leaning to one side with truncal indentation on the left. No GI cause was found. His right hand continues to get weaker and his right arm and fingers continue to be semiflexed while walking. Had another EMG at [facility] that was reportedly normal. Still not decided on botox but is scheduled with me in August. Still experiencing neuropathic pain in the RUE. MRI stable. IGG still low but ALC normal. No infections. He is changing insurance in November and is worried about continuation of coverage    8/29/2024: Labs from July acceptable. Here for his first botox injection session. Tolerated well.     1/28/2025: His whole right side felt much weaker two weeks after botox then strength came back to baseline over the following few weeks. He has since continued to experience progressive RUE weakness and pain. Worsening paraethesia on the back. Worsening scoliosis. Sensitivity to cold. Worsening spasms and clonus in the right arm and leg. Labs from January showed normal ALC and undetectable B cells but continues to show low and declining IGG. No serious infection just URTI symptoms. Will hold off on further botox. Will give him a trial of higher dose tizanidine. Will add cervical Mri to be done in March along with brain MRI to ensure no mechanical causes of worsening RUE weakness. Will refer him to ST given worsening dysphagia.     7/17/2025: Continues with progressive weakness, atrophy, and pain in the RUE. MRI brain and cervical this month are stable without new lesions or any new mechanical neck issues. Previous EMGs have  been normal. I recommended he tries UE bioness and I will see if we can give him a trial of ampyra for hand function. Will increase gabapentin for pain. IgG in the upper 400's now and he also has mild leukopenia and lymphopenia but he reports no infections. He passed barium swallow.     Plan:     - Acute relapse management: Not indicated. He had a good response to empiric steroids in 2023 so may consider this in the future if his symptoms get worse but we have to be conservative with this approach given his low IGG levels.       - Continue Ocrevus.      - Labs: will get labs before the next infusion.      - Will order MRI of the brain to be done in July of 2026.      - Symptomatic management: continue same doses of baclofen to 40 mg BID and tizanidine to 4 mg  daily. Adjust gabapentin to 600 mg TID. Will see if we can give him a trial of ampyra.     - Vitamin D: continue 5000 units daily.      - Smoking: non smoker      - PT/OT: continue, discuss upper extremity bioness with OT.      - Instructions: keep an active life style and develop exercise routine as tolerated. Recommend a balanced healthy diet. Avoid excessive amounts of salt, carbs, fat, and red meat. Increase intake of fruits, vegetables, and white meat.      - Follow up: after six months.      - The impression and plan were discussed with the patient and their family (if present) in detail and all questions answered. They agreed to the plan as outlined above.            Vern Patel MD, PhD   of Neurology  [employer]  Director of Neuroimmunology and staff neurologist at the [facility]  [facility]        Physical Exam

## 2025-07-18 LAB
CD19 CELLS # BLD: <0.001 X10E9/L (ref 0.07–0.91)
CD19 CELLS NFR BLD: <0.1 % (ref 6–19)
FLOW CYTOMETRY SPECIALIST REVIEW: ABNORMAL
LYMPHOCYTES # SPEC AUTO: 1.16 X10*3/UL
PATH REVIEW, B CELL PHENOTYPING, EXTENDED: ABNORMAL

## 2025-07-21 DIAGNOSIS — G35 MULTIPLE SCLEROSIS (MULTI): Primary | ICD-10-CM

## 2025-07-23 DIAGNOSIS — G35 MULTIPLE SCLEROSIS (MULTI): Primary | ICD-10-CM

## 2025-07-23 DIAGNOSIS — N31.9 BLADDER DYSFUNCTION: ICD-10-CM

## 2025-08-04 ENCOUNTER — HOSPITAL ENCOUNTER (OUTPATIENT)
Dept: NEUROLOGY | Facility: HOSPITAL | Age: 60
Discharge: HOME | End: 2025-08-04
Payer: COMMERCIAL

## 2025-08-04 DIAGNOSIS — G35 MULTIPLE SCLEROSIS (MULTI): ICD-10-CM

## 2025-08-04 PROCEDURE — 95816 EEG AWAKE AND DROWSY: CPT

## 2025-08-04 PROCEDURE — 95816 EEG AWAKE AND DROWSY: CPT | Performed by: STUDENT IN AN ORGANIZED HEALTH CARE EDUCATION/TRAINING PROGRAM

## 2025-08-05 DIAGNOSIS — G35 MULTIPLE SCLEROSIS (MULTI): Primary | ICD-10-CM

## 2025-08-05 PROCEDURE — RXMED WILLOW AMBULATORY MEDICATION CHARGE

## 2025-08-05 RX ORDER — DALFAMPRIDINE 10 MG/1
10 TABLET, FILM COATED, EXTENDED RELEASE ORAL 2 TIMES DAILY
Qty: 60 TABLET | Refills: 5 | Status: SHIPPED | OUTPATIENT
Start: 2025-08-05

## 2025-08-06 ENCOUNTER — SPECIALTY PHARMACY (OUTPATIENT)
Dept: PHARMACY | Facility: CLINIC | Age: 60
End: 2025-08-06

## 2025-08-07 ENCOUNTER — PHARMACY VISIT (OUTPATIENT)
Dept: PHARMACY | Facility: CLINIC | Age: 60
End: 2025-08-07
Payer: COMMERCIAL

## 2025-08-18 ENCOUNTER — HOSPITAL ENCOUNTER (OUTPATIENT)
Dept: RADIOLOGY | Facility: CLINIC | Age: 60
Discharge: HOME | End: 2025-08-18
Payer: COMMERCIAL

## 2025-08-18 ENCOUNTER — OFFICE VISIT (OUTPATIENT)
Facility: CLINIC | Age: 60
End: 2025-08-18
Payer: COMMERCIAL

## 2025-08-18 VITALS
BODY MASS INDEX: 25.3 KG/M2 | TEMPERATURE: 97.7 F | WEIGHT: 203.5 LBS | SYSTOLIC BLOOD PRESSURE: 124 MMHG | DIASTOLIC BLOOD PRESSURE: 82 MMHG | RESPIRATION RATE: 14 BRPM | HEART RATE: 59 BPM | HEIGHT: 75 IN

## 2025-08-18 DIAGNOSIS — G35 MULTIPLE SCLEROSIS (MULTI): Primary | ICD-10-CM

## 2025-08-18 DIAGNOSIS — M54.12 CERVICAL RADICULOPATHY: ICD-10-CM

## 2025-08-18 DIAGNOSIS — R26.9 ABNORMALITY OF GAIT: Primary | ICD-10-CM

## 2025-08-18 DIAGNOSIS — M54.2 NECK PAIN: ICD-10-CM

## 2025-08-18 DIAGNOSIS — G35 MULTIPLE SCLEROSIS (MULTI): ICD-10-CM

## 2025-08-18 PROCEDURE — 72050 X-RAY EXAM NECK SPINE 4/5VWS: CPT | Performed by: RADIOLOGY

## 2025-08-18 PROCEDURE — 99213 OFFICE O/P EST LOW 20 MIN: CPT

## 2025-08-18 PROCEDURE — 3008F BODY MASS INDEX DOCD: CPT | Performed by: STUDENT IN AN ORGANIZED HEALTH CARE EDUCATION/TRAINING PROGRAM

## 2025-08-18 PROCEDURE — 72050 X-RAY EXAM NECK SPINE 4/5VWS: CPT

## 2025-08-18 PROCEDURE — 99214 OFFICE O/P EST MOD 30 MIN: CPT | Performed by: STUDENT IN AN ORGANIZED HEALTH CARE EDUCATION/TRAINING PROGRAM

## 2025-08-18 ASSESSMENT — PATIENT HEALTH QUESTIONNAIRE - PHQ9
1. LITTLE INTEREST OR PLEASURE IN DOING THINGS: SEVERAL DAYS
SUM OF ALL RESPONSES TO PHQ QUESTIONS 1-9: 11
3. TROUBLE FALLING OR STAYING ASLEEP OR SLEEPING TOO MUCH: SEVERAL DAYS
6. FEELING BAD ABOUT YOURSELF - OR THAT YOU ARE A FAILURE OR HAVE LET YOURSELF OR YOUR FAMILY DOWN: SEVERAL DAYS
2. FEELING DOWN, DEPRESSED OR HOPELESS: SEVERAL DAYS
4. FEELING TIRED OR HAVING LITTLE ENERGY: MORE THAN HALF THE DAYS
5. POOR APPETITE OR OVEREATING: MORE THAN HALF THE DAYS
SUM OF ALL RESPONSES TO PHQ9 QUESTIONS 1 AND 2: 2
8. MOVING OR SPEAKING SO SLOWLY THAT OTHER PEOPLE COULD HAVE NOTICED. OR THE OPPOSITE, BEING SO FIGETY OR RESTLESS THAT YOU HAVE BEEN MOVING AROUND A LOT MORE THAN USUAL: MORE THAN HALF THE DAYS
7. TROUBLE CONCENTRATING ON THINGS, SUCH AS READING THE NEWSPAPER OR WATCHING TELEVISION: SEVERAL DAYS
9. THOUGHTS THAT YOU WOULD BE BETTER OFF DEAD, OR OF HURTING YOURSELF: NOT AT ALL

## 2025-08-18 ASSESSMENT — PAIN SCALES - GENERAL: PAINLEVEL_OUTOF10: 7

## 2025-08-27 ENCOUNTER — APPOINTMENT (OUTPATIENT)
Dept: PHYSICAL THERAPY | Facility: CLINIC | Age: 60
End: 2025-08-27
Payer: COMMERCIAL

## 2025-08-27 DIAGNOSIS — R29.898 DECREASED ROM OF NECK: ICD-10-CM

## 2025-08-27 DIAGNOSIS — R26.89 BALANCE PROBLEM: ICD-10-CM

## 2025-08-27 DIAGNOSIS — M54.2 NECK PAIN: ICD-10-CM

## 2025-08-27 DIAGNOSIS — R26.9 ABNORMALITY OF GAIT: Primary | ICD-10-CM

## 2025-09-05 ENCOUNTER — SPECIALTY PHARMACY (OUTPATIENT)
Dept: PHARMACY | Facility: CLINIC | Age: 60
End: 2025-09-05

## 2025-09-10 ENCOUNTER — APPOINTMENT (OUTPATIENT)
Dept: PHYSICAL THERAPY | Facility: CLINIC | Age: 60
End: 2025-09-10
Payer: COMMERCIAL

## 2025-09-10 DIAGNOSIS — R26.9 ABNORMALITY OF GAIT: Primary | ICD-10-CM

## 2025-09-10 DIAGNOSIS — M54.2 NECK PAIN: ICD-10-CM

## 2025-09-10 DIAGNOSIS — R26.89 BALANCE PROBLEM: ICD-10-CM

## 2025-09-10 DIAGNOSIS — R29.898 DECREASED ROM OF NECK: ICD-10-CM

## 2025-09-15 ENCOUNTER — APPOINTMENT (OUTPATIENT)
Dept: PHYSICAL THERAPY | Facility: CLINIC | Age: 60
End: 2025-09-15
Payer: COMMERCIAL

## 2025-09-15 DIAGNOSIS — R26.89 BALANCE PROBLEM: ICD-10-CM

## 2025-09-15 DIAGNOSIS — M54.2 NECK PAIN: ICD-10-CM

## 2025-09-15 DIAGNOSIS — R26.9 ABNORMALITY OF GAIT: Primary | ICD-10-CM

## 2025-09-15 DIAGNOSIS — R29.898 DECREASED ROM OF NECK: ICD-10-CM

## 2025-09-16 ENCOUNTER — APPOINTMENT (OUTPATIENT)
Dept: UROLOGY | Facility: CLINIC | Age: 60
End: 2025-09-16
Payer: COMMERCIAL

## 2025-09-29 ENCOUNTER — APPOINTMENT (OUTPATIENT)
Dept: PHYSICAL THERAPY | Facility: CLINIC | Age: 60
End: 2025-09-29
Payer: COMMERCIAL

## 2025-09-29 DIAGNOSIS — R26.89 BALANCE PROBLEM: ICD-10-CM

## 2025-09-29 DIAGNOSIS — R26.9 ABNORMALITY OF GAIT: Primary | ICD-10-CM

## 2025-09-29 DIAGNOSIS — R29.898 DECREASED ROM OF NECK: ICD-10-CM

## 2025-09-29 DIAGNOSIS — M54.2 NECK PAIN: ICD-10-CM

## 2025-10-13 ENCOUNTER — APPOINTMENT (OUTPATIENT)
Dept: PHYSICAL THERAPY | Facility: CLINIC | Age: 60
End: 2025-10-13
Payer: COMMERCIAL

## 2025-10-13 DIAGNOSIS — M54.2 NECK PAIN: ICD-10-CM

## 2025-10-13 DIAGNOSIS — R26.89 BALANCE PROBLEM: ICD-10-CM

## 2025-10-13 DIAGNOSIS — R29.898 DECREASED ROM OF NECK: ICD-10-CM

## 2025-10-13 DIAGNOSIS — R26.9 ABNORMALITY OF GAIT: Primary | ICD-10-CM

## 2025-10-30 ENCOUNTER — APPOINTMENT (OUTPATIENT)
Dept: PHYSICAL THERAPY | Facility: CLINIC | Age: 60
End: 2025-10-30
Payer: COMMERCIAL

## 2025-10-30 DIAGNOSIS — R29.898 DECREASED ROM OF NECK: ICD-10-CM

## 2025-10-30 DIAGNOSIS — R26.89 BALANCE PROBLEM: ICD-10-CM

## 2025-10-30 DIAGNOSIS — R26.9 ABNORMALITY OF GAIT: Primary | ICD-10-CM

## 2025-10-30 DIAGNOSIS — M54.2 NECK PAIN: ICD-10-CM

## 2026-01-22 ENCOUNTER — APPOINTMENT (OUTPATIENT)
Dept: INFUSION THERAPY | Facility: CLINIC | Age: 61
End: 2026-01-22
Payer: COMMERCIAL